# Patient Record
Sex: MALE | Race: BLACK OR AFRICAN AMERICAN | NOT HISPANIC OR LATINO | Employment: UNEMPLOYED | URBAN - METROPOLITAN AREA
[De-identification: names, ages, dates, MRNs, and addresses within clinical notes are randomized per-mention and may not be internally consistent; named-entity substitution may affect disease eponyms.]

---

## 2018-10-14 ENCOUNTER — HISTORICAL (OUTPATIENT)
Dept: LAB | Facility: HOSPITAL | Age: 48
End: 2018-10-14

## 2018-10-14 LAB
ABS NEUT (OLG): 5.27 X10(3)/MCL (ref 2.1–9.2)
ALBUMIN SERPL-MCNC: 3.4 GM/DL (ref 3.4–5)
ALBUMIN/GLOB SERPL: 1 RATIO (ref 1–2)
ALP SERPL-CCNC: 63 UNIT/L (ref 45–117)
ALT SERPL-CCNC: 54 UNIT/L (ref 12–78)
AMPHET UR QL SCN: NEGATIVE
AST SERPL-CCNC: 131 UNIT/L (ref 15–37)
BARBITURATE SCN PRESENT UR: NEGATIVE
BASOPHILS # BLD AUTO: 0.06 X10(3)/MCL
BASOPHILS NFR BLD AUTO: 1 %
BENZODIAZ UR QL SCN: NEGATIVE
BILIRUB SERPL-MCNC: 0.6 MG/DL (ref 0.2–1)
BILIRUBIN DIRECT+TOT PNL SERPL-MCNC: 0.2 MG/DL
BILIRUBIN DIRECT+TOT PNL SERPL-MCNC: 0.4 MG/DL
BUN SERPL-MCNC: 15 MG/DL (ref 7–18)
CALCIUM SERPL-MCNC: 8.3 MG/DL (ref 8.5–10.1)
CANNABINOIDS UR QL SCN: NEGATIVE
CHLORIDE SERPL-SCNC: 103 MMOL/L (ref 98–107)
CO2 SERPL-SCNC: 30 MMOL/L (ref 21–32)
COCAINE UR QL SCN: POSITIVE
CREAT SERPL-MCNC: 1.5 MG/DL (ref 0.6–1.3)
EOSINOPHIL # BLD AUTO: 0.24 X10(3)/MCL
EOSINOPHIL NFR BLD AUTO: 3 %
ERYTHROCYTE [DISTWIDTH] IN BLOOD BY AUTOMATED COUNT: 14.6 % (ref 11.5–14.5)
ETHANOL SERPL-MCNC: <3 MG/DL
GLOBULIN SER-MCNC: 3.6 GM/ML (ref 2.3–3.5)
GLUCOSE SERPL-MCNC: 91 MG/DL (ref 74–106)
HCT VFR BLD AUTO: 42.1 % (ref 40–51)
HGB BLD-MCNC: 13.5 GM/DL (ref 13.5–17.5)
IMM GRANULOCYTES # BLD AUTO: 0.02 10*3/UL
IMM GRANULOCYTES NFR BLD AUTO: 0 %
LYMPHOCYTES # BLD AUTO: 1.6 X10(3)/MCL
LYMPHOCYTES NFR BLD AUTO: 20 % (ref 13–40)
MAGNESIUM SERPL-MCNC: 2.6 MG/DL (ref 1.8–2.4)
MCH RBC QN AUTO: 29.7 PG (ref 26–34)
MCHC RBC AUTO-ENTMCNC: 32.1 GM/DL (ref 31–37)
MCV RBC AUTO: 92.5 FL (ref 80–100)
MONOCYTES # BLD AUTO: 0.84 X10(3)/MCL
MONOCYTES NFR BLD AUTO: 10 % (ref 4–12)
NEUTROPHILS # BLD AUTO: 5.27 X10(3)/MCL
NEUTROPHILS NFR BLD AUTO: 66 X10(3)/MCL
OPIATES UR QL SCN: NEGATIVE
PCP UR QL: NEGATIVE
PHOSPHATE SERPL-MCNC: 4.1 MG/DL (ref 2.5–4.9)
PLATELET # BLD AUTO: 224 X10(3)/MCL (ref 130–400)
PMV BLD AUTO: 11.9 FL (ref 7.4–10.4)
POTASSIUM SERPL-SCNC: 3.9 MMOL/L (ref 3.5–5.1)
PROT SERPL-MCNC: 7 GM/DL (ref 6.4–8.2)
RBC # BLD AUTO: 4.55 X10(6)/MCL (ref 4.5–5.9)
SODIUM SERPL-SCNC: 139 MMOL/L (ref 136–145)
WBC # SPEC AUTO: 8 X10(3)/MCL (ref 4.5–11)

## 2022-05-05 PROCEDURE — 99284 EMERGENCY DEPT VISIT MOD MDM: CPT

## 2022-05-06 ENCOUNTER — HOSPITAL ENCOUNTER (EMERGENCY)
Facility: HOSPITAL | Age: 52
Discharge: HOME | End: 2022-05-06
Attending: FAMILY MEDICINE
Payer: MEDICAID

## 2022-05-06 VITALS
TEMPERATURE: 97 F | OXYGEN SATURATION: 99 % | HEART RATE: 72 BPM | WEIGHT: 162.25 LBS | DIASTOLIC BLOOD PRESSURE: 110 MMHG | SYSTOLIC BLOOD PRESSURE: 174 MMHG | RESPIRATION RATE: 20 BRPM | BODY MASS INDEX: 20.17 KG/M2 | HEIGHT: 75 IN

## 2022-05-06 DIAGNOSIS — I10 HYPERTENSION, UNSPECIFIED TYPE: ICD-10-CM

## 2022-05-06 DIAGNOSIS — M21.612 BUNION OF GREAT TOE OF LEFT FOOT: ICD-10-CM

## 2022-05-06 DIAGNOSIS — B35.3 TINEA PEDIS OF BOTH FEET: Primary | ICD-10-CM

## 2022-05-06 PROCEDURE — 25000003 PHARM REV CODE 250: Performed by: STUDENT IN AN ORGANIZED HEALTH CARE EDUCATION/TRAINING PROGRAM

## 2022-05-06 RX ORDER — CLOTRIMAZOLE 1 %
CREAM (GRAM) TOPICAL
Qty: 15 G | Refills: 0 | Status: SHIPPED | OUTPATIENT
Start: 2022-05-06 | End: 2022-09-17 | Stop reason: ALTCHOICE

## 2022-05-06 RX ORDER — AMLODIPINE BESYLATE 5 MG/1
5 TABLET ORAL DAILY
Qty: 30 TABLET | Refills: 0 | Status: SHIPPED | OUTPATIENT
Start: 2022-05-06 | End: 2022-09-17 | Stop reason: ALTCHOICE

## 2022-05-06 RX ORDER — AMLODIPINE BESYLATE 5 MG/1
5 TABLET ORAL
Status: COMPLETED | OUTPATIENT
Start: 2022-05-06 | End: 2022-05-06

## 2022-05-06 RX ADMIN — AMLODIPINE BESYLATE 5 MG: 5 TABLET ORAL at 01:05

## 2022-05-06 RX ADMIN — FLUCONAZOLE 150 MG: 50 TABLET ORAL at 01:05

## 2022-05-06 NOTE — ED PROVIDER NOTES
Encounter Date: 5/5/2022       History     Chief Complaint   Patient presents with    Leg Pain     Jamshid. Foot pain     A 52-year-old history reason for ED with bilateral leg pain.  Patient with a sharp, throbbing pain especially in the area of his left great big toe.  Patient also has a wet intertrigo.  Patient presented with similar complaints in the past, patient was given Clotrimazole which she he did not help with his symptoms.  Patient that this is been happening for the past 3 months.  Patient attributed to his poor foot wear.  Patient denies any other symptoms at this time.        Review of patient's allergies indicates:  No Known Allergies  No past medical history on file.  No past surgical history on file.  No family history on file.     Review of Systems   Constitutional: Negative for diaphoresis, fever and unexpected weight change.   Respiratory: Negative for cough, shortness of breath and wheezing.    Cardiovascular: Negative for chest pain.   Gastrointestinal: Negative for blood in stool, constipation, diarrhea, nausea and vomiting.   Genitourinary: Negative for dysuria and hematuria.   Neurological: Negative for headaches.       Physical Exam     Initial Vitals [05/05/22 2352]   BP Pulse Resp Temp SpO2   (!) 174/115 75 20 97 °F (36.1 °C) 99 %      MAP       --         Physical Exam    Constitutional: He appears well-developed and well-nourished. No distress.   HENT:   Mouth/Throat: Oropharynx is clear and moist and mucous membranes are normal.   Eyes: Conjunctivae and EOM are normal. Pupils are equal, round, and reactive to light.   Neck: No stridor present. No JVD present.   Cardiovascular: Normal rate, regular rhythm, S1 normal, S2 normal, normal heart sounds, intact distal pulses and normal pulses.   Pulmonary/Chest: Effort normal and breath sounds normal. No accessory muscle usage. No respiratory distress. He has no wheezes. He has no rhonchi.   Abdominal: Abdomen is soft. There is no abdominal  tenderness.   Musculoskeletal:        Legs:       Comments: Toe Web tinea      Neurological: He is alert and oriented to person, place, and time. He has normal strength. No cranial nerve deficit. He displays a negative Romberg sign.   Skin: Skin is warm, dry and intact.   Psychiatric: He has a normal mood and affect. His speech is normal.         ED Course   Procedures  Labs Reviewed - No data to display       Imaging Results    None          Medications   fluconazole tablet 150 mg (150 mg Oral Given 5/6/22 0118)   amLODIPine tablet 5 mg (5 mg Oral Given 5/6/22 0118)     Medical Decision Making:   Initial Assessment:   Tinea Pedis  Bunion  Patient was found to be hypertensive, in chart review patient has been hypertensive   ED Management:  PO fluconazole, Patient was started on amlodipine    Scripts were given for topical clotrimazole and amlodipine                        Clinical Impression:   Final diagnoses:  [B35.3] Tinea pedis of both feet (Primary)  [M21.612] Bunion of great toe of left foot     Follow up in IM clinic for bunion, tinea, and htn            Antoine Brooke MD  Resident  05/06/22 4028

## 2022-09-17 ENCOUNTER — HOSPITAL ENCOUNTER (EMERGENCY)
Facility: HOSPITAL | Age: 52
Discharge: HOME | End: 2022-09-17
Attending: FAMILY MEDICINE
Payer: MEDICAID

## 2022-09-17 VITALS
OXYGEN SATURATION: 98 % | SYSTOLIC BLOOD PRESSURE: 157 MMHG | HEIGHT: 75 IN | RESPIRATION RATE: 18 BRPM | HEART RATE: 77 BPM | TEMPERATURE: 98 F | BODY MASS INDEX: 20.31 KG/M2 | DIASTOLIC BLOOD PRESSURE: 97 MMHG | WEIGHT: 163.38 LBS

## 2022-09-17 DIAGNOSIS — M21.612 BILATERAL BUNIONS: Primary | ICD-10-CM

## 2022-09-17 DIAGNOSIS — M21.611 BILATERAL BUNIONS: Primary | ICD-10-CM

## 2022-09-17 PROCEDURE — 99283 EMERGENCY DEPT VISIT LOW MDM: CPT

## 2022-09-17 RX ORDER — INDOMETHACIN 50 MG/1
50 CAPSULE ORAL 3 TIMES DAILY PRN
Qty: 30 CAPSULE | Refills: 0 | Status: SHIPPED | OUTPATIENT
Start: 2022-09-17 | End: 2022-09-27

## 2022-12-30 ENCOUNTER — HOSPITAL ENCOUNTER (EMERGENCY)
Facility: HOSPITAL | Age: 52
Discharge: PSYCHIATRIC OR SUBSTANCE USE FACILITY | End: 2022-12-30
Attending: EMERGENCY MEDICINE
Payer: MEDICAID

## 2022-12-30 VITALS
WEIGHT: 171.94 LBS | RESPIRATION RATE: 18 BRPM | HEIGHT: 74 IN | BODY MASS INDEX: 22.07 KG/M2 | DIASTOLIC BLOOD PRESSURE: 83 MMHG | OXYGEN SATURATION: 97 % | TEMPERATURE: 99 F | HEART RATE: 69 BPM | SYSTOLIC BLOOD PRESSURE: 141 MMHG

## 2022-12-30 DIAGNOSIS — F10.10 ALCOHOL ABUSE: ICD-10-CM

## 2022-12-30 DIAGNOSIS — R44.1 VISUAL HALLUCINATIONS: ICD-10-CM

## 2022-12-30 DIAGNOSIS — F14.10 COCAINE ABUSE: ICD-10-CM

## 2022-12-30 DIAGNOSIS — Z91.199 MEDICALLY NONCOMPLIANT: ICD-10-CM

## 2022-12-30 DIAGNOSIS — Z00.8 MEDICAL CLEARANCE FOR PSYCHIATRIC ADMISSION: Primary | ICD-10-CM

## 2022-12-30 DIAGNOSIS — R44.0 AUDITORY HALLUCINATIONS: ICD-10-CM

## 2022-12-30 DIAGNOSIS — S00.83XA FACIAL CONTUSION, INITIAL ENCOUNTER: ICD-10-CM

## 2022-12-30 DIAGNOSIS — F31.9 BIPOLAR AFFECTIVE DISORDER, REMISSION STATUS UNSPECIFIED: ICD-10-CM

## 2022-12-30 DIAGNOSIS — R45.850 HOMICIDAL IDEATION: ICD-10-CM

## 2022-12-30 DIAGNOSIS — F23 ACUTE PSYCHOSIS: ICD-10-CM

## 2022-12-30 DIAGNOSIS — F20.9 CHRONIC SCHIZOPHRENIA: ICD-10-CM

## 2022-12-30 LAB
ALBUMIN SERPL-MCNC: 4.3 G/DL (ref 3.5–5)
ALBUMIN/GLOB SERPL: 1 RATIO (ref 1.1–2)
ALP SERPL-CCNC: 91 UNIT/L (ref 40–150)
ALT SERPL-CCNC: 23 UNIT/L (ref 0–55)
AMPHET UR QL SCN: NEGATIVE
APAP SERPL-MCNC: <17.4 UG/ML (ref 17.4–30)
APPEARANCE UR: CLEAR
AST SERPL-CCNC: 36 UNIT/L (ref 5–34)
BACTERIA #/AREA URNS AUTO: ABNORMAL /HPF
BARBITURATE SCN PRESENT UR: NEGATIVE
BASOPHILS # BLD AUTO: 0.06 X10(3)/MCL (ref 0–0.2)
BASOPHILS NFR BLD AUTO: 0.9 %
BENZODIAZ UR QL SCN: NEGATIVE
BILIRUB UR QL STRIP.AUTO: NEGATIVE MG/DL
BILIRUBIN DIRECT+TOT PNL SERPL-MCNC: 0.5 MG/DL
BUN SERPL-MCNC: 17.4 MG/DL (ref 8.4–25.7)
CALCIUM SERPL-MCNC: 10 MG/DL (ref 8.4–10.2)
CANNABINOIDS UR QL SCN: NEGATIVE
CHLORIDE SERPL-SCNC: 103 MMOL/L (ref 98–107)
CO2 SERPL-SCNC: 29 MMOL/L (ref 22–29)
COCAINE UR QL SCN: POSITIVE
COLOR UR AUTO: YELLOW
CREAT SERPL-MCNC: 1.29 MG/DL (ref 0.73–1.18)
EOSINOPHIL # BLD AUTO: 0.14 X10(3)/MCL (ref 0–0.9)
EOSINOPHIL NFR BLD AUTO: 2.2 %
ERYTHROCYTE [DISTWIDTH] IN BLOOD BY AUTOMATED COUNT: 14.6 % (ref 11.6–14.4)
ETHANOL SERPL-MCNC: <10 MG/DL
FENTANYL UR QL SCN: NEGATIVE
GFR SERPLBLD CREATININE-BSD FMLA CKD-EPI: >60 MLS/MIN/1.73/M2
GLOBULIN SER-MCNC: 4.1 GM/DL (ref 2.4–3.5)
GLUCOSE SERPL-MCNC: 61 MG/DL (ref 74–100)
GLUCOSE UR QL STRIP.AUTO: NORMAL MG/DL
HCT VFR BLD AUTO: 40.8 % (ref 42–52)
HGB BLD-MCNC: 13.2 GM/DL (ref 14–18)
HYALINE CASTS #/AREA URNS LPF: ABNORMAL /LPF
IMM GRANULOCYTES # BLD AUTO: 0.01 X10(3)/MCL (ref 0–0.04)
IMM GRANULOCYTES NFR BLD AUTO: 0.2 %
KETONES UR QL STRIP.AUTO: NEGATIVE MG/DL
LEUKOCYTE ESTERASE UR QL STRIP.AUTO: NEGATIVE UNIT/L
LYMPHOCYTES # BLD AUTO: 1.4 X10(3)/MCL (ref 0.6–4.6)
LYMPHOCYTES NFR BLD AUTO: 21.8 %
MCH RBC QN AUTO: 30.2 PG
MCHC RBC AUTO-ENTMCNC: 32.4 MG/DL (ref 33–36)
MCV RBC AUTO: 93.4 FL (ref 80–94)
MDMA UR QL SCN: NEGATIVE
MONOCYTES # BLD AUTO: 0.91 X10(3)/MCL (ref 0.1–1.3)
MONOCYTES NFR BLD AUTO: 14.2 %
MUCOUS THREADS URNS QL MICRO: ABNORMAL /LPF
NEUTROPHILS # BLD AUTO: 3.91 X10(3)/MCL (ref 2.1–9.2)
NEUTROPHILS NFR BLD AUTO: 60.7 %
NITRITE UR QL STRIP.AUTO: NEGATIVE
NRBC BLD AUTO-RTO: 0 % (ref 0–1)
OPIATES UR QL SCN: NEGATIVE
PCP UR QL: NEGATIVE
PH UR STRIP.AUTO: 5.5 [PH]
PH UR: 5.5 [PH] (ref 3–11)
PLATELET # BLD AUTO: 270 X10(3)/MCL (ref 140–371)
PMV BLD AUTO: 11.5 FL (ref 9.4–12.4)
POTASSIUM SERPL-SCNC: 4.1 MMOL/L (ref 3.5–5.1)
PROT SERPL-MCNC: 8.4 GM/DL (ref 6.4–8.3)
PROT UR QL STRIP.AUTO: ABNORMAL MG/DL
RBC # BLD AUTO: 4.37 X10(6)/MCL (ref 4.7–6.1)
RBC #/AREA URNS AUTO: ABNORMAL /HPF
RBC UR QL AUTO: NEGATIVE UNIT/L
SALICYLATES SERPL-MCNC: <5 MG/DL
SARS-COV-2 RDRP RESP QL NAA+PROBE: NEGATIVE
SODIUM SERPL-SCNC: 141 MMOL/L (ref 136–145)
SP GR UR STRIP.AUTO: 1.03
SQUAMOUS #/AREA URNS LPF: ABNORMAL /HPF
TSH SERPL-ACNC: 1.34 UIU/ML (ref 0.35–4.94)
UROBILINOGEN UR STRIP-ACNC: ABNORMAL MG/DL
WBC # SPEC AUTO: 6.4 X10(3)/MCL (ref 4.5–11.5)
WBC #/AREA URNS AUTO: ABNORMAL /HPF

## 2022-12-30 PROCEDURE — 85025 COMPLETE CBC W/AUTO DIFF WBC: CPT | Performed by: EMERGENCY MEDICINE

## 2022-12-30 PROCEDURE — 99285 EMERGENCY DEPT VISIT HI MDM: CPT | Mod: 25

## 2022-12-30 PROCEDURE — 84443 ASSAY THYROID STIM HORMONE: CPT | Performed by: EMERGENCY MEDICINE

## 2022-12-30 PROCEDURE — 25000003 PHARM REV CODE 250: Performed by: EMERGENCY MEDICINE

## 2022-12-30 PROCEDURE — 80143 DRUG ASSAY ACETAMINOPHEN: CPT | Performed by: EMERGENCY MEDICINE

## 2022-12-30 PROCEDURE — 80179 DRUG ASSAY SALICYLATE: CPT | Performed by: EMERGENCY MEDICINE

## 2022-12-30 PROCEDURE — 87635 SARS-COV-2 COVID-19 AMP PRB: CPT | Performed by: EMERGENCY MEDICINE

## 2022-12-30 PROCEDURE — 80307 DRUG TEST PRSMV CHEM ANLYZR: CPT | Performed by: EMERGENCY MEDICINE

## 2022-12-30 PROCEDURE — 81001 URINALYSIS AUTO W/SCOPE: CPT | Performed by: EMERGENCY MEDICINE

## 2022-12-30 PROCEDURE — 80053 COMPREHEN METABOLIC PANEL: CPT | Performed by: EMERGENCY MEDICINE

## 2022-12-30 PROCEDURE — 82077 ASSAY SPEC XCP UR&BREATH IA: CPT | Performed by: EMERGENCY MEDICINE

## 2022-12-30 RX ORDER — CLINDAMYCIN HYDROCHLORIDE 150 MG/1
300 CAPSULE ORAL EVERY 8 HOURS
Status: DISCONTINUED | OUTPATIENT
Start: 2022-12-30 | End: 2022-12-30 | Stop reason: HOSPADM

## 2022-12-30 RX ADMIN — CLINDAMYCIN HYDROCHLORIDE 300 MG: 150 CAPSULE ORAL at 10:12
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
## 2023-01-14 ENCOUNTER — HOSPITAL ENCOUNTER (EMERGENCY)
Facility: HOSPITAL | Age: 53
Discharge: PSYCHIATRIC OR SUBSTANCE USE FACILITY | End: 2023-01-15
Attending: EMERGENCY MEDICINE
Payer: MEDICAID

## 2023-01-14 DIAGNOSIS — R45.850 HOMICIDAL IDEATIONS: ICD-10-CM

## 2023-01-14 DIAGNOSIS — R44.3 HALLUCINATIONS: Primary | ICD-10-CM

## 2023-01-14 LAB
ABS NEUT CALC (OHS): 6.21 X10(3)/MCL (ref 2.1–9.2)
ALBUMIN SERPL-MCNC: 4 G/DL (ref 3.5–5)
ALBUMIN/GLOB SERPL: 1.1 RATIO (ref 1.1–2)
ALP SERPL-CCNC: 83 UNIT/L (ref 40–150)
ALT SERPL-CCNC: 22 UNIT/L (ref 0–55)
AMPHET UR QL SCN: NEGATIVE
APAP SERPL-MCNC: <17.4 UG/ML (ref 17.4–30)
APPEARANCE UR: CLEAR
AST SERPL-CCNC: 25 UNIT/L (ref 5–34)
BACTERIA #/AREA URNS AUTO: ABNORMAL /HPF
BARBITURATE SCN PRESENT UR: NEGATIVE
BENZODIAZ UR QL SCN: NEGATIVE
BILIRUB UR QL STRIP.AUTO: NEGATIVE MG/DL
BILIRUBIN DIRECT+TOT PNL SERPL-MCNC: 0.4 MG/DL
BUN SERPL-MCNC: 15.1 MG/DL (ref 8.4–25.7)
CALCIUM SERPL-MCNC: 9.6 MG/DL (ref 8.4–10.2)
CANNABINOIDS UR QL SCN: NEGATIVE
CHLORIDE SERPL-SCNC: 104 MMOL/L (ref 98–107)
CO2 SERPL-SCNC: 27 MMOL/L (ref 22–29)
COCAINE UR QL SCN: POSITIVE
COLOR UR AUTO: COLORLESS
CREAT SERPL-MCNC: 1.15 MG/DL (ref 0.73–1.18)
EOSINOPHIL NFR BLD MANUAL: 0.29 X10(3)/MCL (ref 0–0.9)
EOSINOPHIL NFR BLD MANUAL: 3 % (ref 0–8)
ERYTHROCYTE [DISTWIDTH] IN BLOOD BY AUTOMATED COUNT: 14.1 % (ref 11.5–17)
ETHANOL SERPL-MCNC: <10 MG/DL
FENTANYL UR QL SCN: NEGATIVE
FLUAV AG UPPER RESP QL IA.RAPID: NOT DETECTED
FLUBV AG UPPER RESP QL IA.RAPID: NOT DETECTED
GFR SERPLBLD CREATININE-BSD FMLA CKD-EPI: >60 MLS/MIN/1.73/M2
GLOBULIN SER-MCNC: 3.8 GM/DL (ref 2.4–3.5)
GLUCOSE SERPL-MCNC: 81 MG/DL (ref 74–100)
GLUCOSE UR QL STRIP.AUTO: NORMAL MG/DL
HCT VFR BLD AUTO: 40.9 % (ref 42–52)
HGB BLD-MCNC: 12.6 GM/DL (ref 14–18)
HYALINE CASTS #/AREA URNS LPF: ABNORMAL /LPF
IMM GRANULOCYTES # BLD AUTO: 0.02 X10(3)/MCL (ref 0–0.04)
IMM GRANULOCYTES NFR BLD AUTO: 0.2 %
KETONES UR QL STRIP.AUTO: NEGATIVE MG/DL
LEUKOCYTE ESTERASE UR QL STRIP.AUTO: NEGATIVE UNIT/L
LYMPHOCYTES NFR BLD MANUAL: 2.04 X10(3)/MCL
LYMPHOCYTES NFR BLD MANUAL: 21 % (ref 13–40)
MCH RBC QN AUTO: 29.2 PG
MCHC RBC AUTO-ENTMCNC: 30.8 MG/DL (ref 33–36)
MCV RBC AUTO: 94.9 FL (ref 80–94)
MDMA UR QL SCN: NEGATIVE
MONOCYTES NFR BLD MANUAL: 1.16 X10(3)/MCL (ref 0.1–1.3)
MONOCYTES NFR BLD MANUAL: 12 % (ref 2–11)
NEUTROPHILS NFR BLD MANUAL: 64 % (ref 47–80)
NITRITE UR QL STRIP.AUTO: NEGATIVE
NRBC BLD AUTO-RTO: 0 %
OPIATES UR QL SCN: NEGATIVE
PCP UR QL: NEGATIVE
PH UR STRIP.AUTO: 7 [PH]
PH UR: 7 [PH] (ref 3–11)
PLATELET # BLD AUTO: 250 X10(3)/MCL (ref 130–400)
PLATELET # BLD EST: ADEQUATE 10*3/UL
PMV BLD AUTO: 11.9 FL (ref 7.4–10.4)
POTASSIUM SERPL-SCNC: 4 MMOL/L (ref 3.5–5.1)
PROT SERPL-MCNC: 7.8 GM/DL (ref 6.4–8.3)
PROT UR QL STRIP.AUTO: NEGATIVE MG/DL
RBC # BLD AUTO: 4.31 X10(6)/MCL (ref 4.7–6.1)
RBC #/AREA URNS AUTO: ABNORMAL /HPF
RBC MORPH BLD: NORMAL
RBC UR QL AUTO: NEGATIVE UNIT/L
SARS-COV-2 RNA RESP QL NAA+PROBE: NOT DETECTED
SODIUM SERPL-SCNC: 139 MMOL/L (ref 136–145)
SP GR UR STRIP.AUTO: 1.01
SPECIFIC GRAVITY, URINE AUTO (.000) (OHS): 1.01 (ref 1–1.03)
SQUAMOUS #/AREA URNS LPF: ABNORMAL /HPF
TSH SERPL-ACNC: 1.52 UIU/ML (ref 0.35–4.94)
UROBILINOGEN UR STRIP-ACNC: NORMAL MG/DL
WBC # SPEC AUTO: 9.7 X10(3)/MCL (ref 4.5–11.5)
WBC #/AREA URNS AUTO: ABNORMAL /HPF

## 2023-01-14 PROCEDURE — 80143 DRUG ASSAY ACETAMINOPHEN: CPT | Performed by: EMERGENCY MEDICINE

## 2023-01-14 PROCEDURE — 82077 ASSAY SPEC XCP UR&BREATH IA: CPT | Performed by: EMERGENCY MEDICINE

## 2023-01-14 PROCEDURE — 81001 URINALYSIS AUTO W/SCOPE: CPT | Mod: 59 | Performed by: EMERGENCY MEDICINE

## 2023-01-14 PROCEDURE — 85027 COMPLETE CBC AUTOMATED: CPT | Performed by: EMERGENCY MEDICINE

## 2023-01-14 PROCEDURE — 0240U COVID/FLU A&B PCR: CPT | Performed by: EMERGENCY MEDICINE

## 2023-01-14 PROCEDURE — 84443 ASSAY THYROID STIM HORMONE: CPT | Performed by: EMERGENCY MEDICINE

## 2023-01-14 PROCEDURE — 80307 DRUG TEST PRSMV CHEM ANLYZR: CPT | Performed by: EMERGENCY MEDICINE

## 2023-01-14 PROCEDURE — 99285 EMERGENCY DEPT VISIT HI MDM: CPT

## 2023-01-14 PROCEDURE — 80053 COMPREHEN METABOLIC PANEL: CPT | Performed by: EMERGENCY MEDICINE

## 2023-01-15 ENCOUNTER — HOSPITAL ENCOUNTER (INPATIENT)
Facility: HOSPITAL | Age: 53
LOS: 5 days | Discharge: HOME | DRG: 885 | End: 2023-01-20
Attending: PSYCHIATRY & NEUROLOGY | Admitting: PSYCHIATRY & NEUROLOGY
Payer: MEDICAID

## 2023-01-15 VITALS
OXYGEN SATURATION: 98 % | HEIGHT: 74 IN | RESPIRATION RATE: 17 BRPM | BODY MASS INDEX: 23.1 KG/M2 | WEIGHT: 180 LBS | TEMPERATURE: 98 F | HEART RATE: 87 BPM | SYSTOLIC BLOOD PRESSURE: 151 MMHG | DIASTOLIC BLOOD PRESSURE: 81 MMHG

## 2023-01-15 DIAGNOSIS — F29 PSYCHOSIS: ICD-10-CM

## 2023-01-15 PROCEDURE — 11400000 HC PSYCH PRIVATE ROOM

## 2023-01-15 PROCEDURE — 25000003 PHARM REV CODE 250

## 2023-01-15 RX ORDER — DIPHENHYDRAMINE HCL 50 MG
50 CAPSULE ORAL EVERY 6 HOURS PRN
Status: DISCONTINUED | OUTPATIENT
Start: 2023-01-16 | End: 2023-01-20 | Stop reason: HOSPADM

## 2023-01-15 RX ORDER — IBUPROFEN 200 MG
1 TABLET ORAL DAILY
Status: DISCONTINUED | OUTPATIENT
Start: 2023-01-15 | End: 2023-01-20 | Stop reason: HOSPADM

## 2023-01-15 RX ORDER — HYDROXYZINE HYDROCHLORIDE 50 MG/1
50 TABLET, FILM COATED ORAL EVERY 6 HOURS PRN
Status: DISCONTINUED | OUTPATIENT
Start: 2023-01-15 | End: 2023-01-20 | Stop reason: HOSPADM

## 2023-01-15 RX ORDER — ACETAMINOPHEN 325 MG/1
650 TABLET ORAL EVERY 6 HOURS PRN
Status: DISCONTINUED | OUTPATIENT
Start: 2023-01-16 | End: 2023-01-20 | Stop reason: HOSPADM

## 2023-01-15 RX ORDER — DIPHENHYDRAMINE HYDROCHLORIDE 50 MG/ML
50 INJECTION INTRAMUSCULAR; INTRAVENOUS EVERY 6 HOURS PRN
Status: DISCONTINUED | OUTPATIENT
Start: 2023-01-16 | End: 2023-01-20 | Stop reason: HOSPADM

## 2023-01-15 RX ORDER — ONDANSETRON 4 MG/1
4 TABLET, ORALLY DISINTEGRATING ORAL EVERY 8 HOURS PRN
Status: DISCONTINUED | OUTPATIENT
Start: 2023-01-15 | End: 2023-01-20 | Stop reason: HOSPADM

## 2023-01-15 RX ORDER — HALOPERIDOL 5 MG/ML
10 INJECTION INTRAMUSCULAR EVERY 6 HOURS PRN
Status: DISCONTINUED | OUTPATIENT
Start: 2023-01-16 | End: 2023-01-20 | Stop reason: HOSPADM

## 2023-01-15 RX ORDER — RISPERIDONE 0.25 MG/1
0.5 TABLET ORAL 2 TIMES DAILY
Status: DISCONTINUED | OUTPATIENT
Start: 2023-01-15 | End: 2023-01-20 | Stop reason: HOSPADM

## 2023-01-15 RX ORDER — TRAZODONE HYDROCHLORIDE 100 MG/1
100 TABLET ORAL NIGHTLY PRN
Status: DISCONTINUED | OUTPATIENT
Start: 2023-01-15 | End: 2023-01-20 | Stop reason: HOSPADM

## 2023-01-15 RX ORDER — HALOPERIDOL 5 MG/1
10 TABLET ORAL EVERY 6 HOURS PRN
Status: DISCONTINUED | OUTPATIENT
Start: 2023-01-16 | End: 2023-01-20 | Stop reason: HOSPADM

## 2023-01-15 RX ORDER — ADHESIVE BANDAGE
30 BANDAGE TOPICAL DAILY PRN
Status: DISCONTINUED | OUTPATIENT
Start: 2023-01-16 | End: 2023-01-20 | Stop reason: HOSPADM

## 2023-01-15 RX ORDER — PROMETHAZINE HYDROCHLORIDE 25 MG/1
25 TABLET ORAL EVERY 6 HOURS PRN
Status: DISCONTINUED | OUTPATIENT
Start: 2023-01-15 | End: 2023-01-20 | Stop reason: HOSPADM

## 2023-01-15 RX ORDER — LORAZEPAM 2 MG/ML
2 INJECTION INTRAMUSCULAR EVERY 6 HOURS PRN
Status: DISCONTINUED | OUTPATIENT
Start: 2023-01-16 | End: 2023-01-20 | Stop reason: HOSPADM

## 2023-01-15 RX ORDER — MAG HYDROX/ALUMINUM HYD/SIMETH 200-200-20
30 SUSPENSION, ORAL (FINAL DOSE FORM) ORAL EVERY 6 HOURS PRN
Status: DISCONTINUED | OUTPATIENT
Start: 2023-01-16 | End: 2023-01-20 | Stop reason: HOSPADM

## 2023-01-15 RX ORDER — SERTRALINE HYDROCHLORIDE 50 MG/1
50 TABLET, FILM COATED ORAL DAILY
Status: DISCONTINUED | OUTPATIENT
Start: 2023-01-15 | End: 2023-01-18

## 2023-01-15 RX ADMIN — RISPERIDONE 0.5 MG: 0.25 TABLET ORAL at 08:01

## 2023-01-15 RX ADMIN — RISPERIDONE 0.5 MG: 0.25 TABLET ORAL at 12:01

## 2023-01-15 RX ADMIN — SERTRALINE HYDROCHLORIDE 50 MG: 50 TABLET ORAL at 02:01
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
## 2023-01-15 NOTE — NURSING
This 52 year old male is admitted from [facility] ED for psychosis. He is alert, oriented and cooperative. Contraband search done. Patient oriented to unit and rules. He denies any thoughts of self-harm. Patient reports having auditory and visual hallucinations along with homicidal ideations. Staff will maintain close observation for safety.

## 2023-01-15 NOTE — PLAN OF CARE
Problem: Adult Inpatient Plan of Care  Goal: Plan of Care Review  Outcome: Ongoing, Progressing  Goal: Patient-Specific Goal (Individualized)  Outcome: Ongoing, Progressing  Goal: Absence of Hospital-Acquired Illness or Injury  Outcome: Ongoing, Progressing  Goal: Optimal Comfort and Wellbeing  Outcome: Ongoing, Progressing  Goal: Readiness for Transition of Care  Outcome: Ongoing, Progressing     Problem: Activity and Energy Impairment (Excessive Substance Use)  Goal: Optimized Energy Level (Excessive Substance Use)  Outcome: Ongoing, Progressing     Problem: Behavior Regulation Impairment (Excessive Substance Use)  Goal: Improved Behavioral Control (Excessive Substance Use)  Outcome: Ongoing, Progressing     Problem: Decreased Participation and Engagement (Excessive Substance Use)  Goal: Increased Participation and Engagement (Excessive Substance Use)  Outcome: Ongoing, Progressing     Problem: Physiologic Impairment (Excessive Substance Use)  Goal: Improved Physiologic Symptoms (Excessive Substance Use)  Outcome: Ongoing, Progressing     Problem: Social, Occupational or Functional Impairment (Excessive Substance Use)  Goal: Enhanced Social, Occupational or Functional Skills (Excessive Substance Use)  Outcome: Ongoing, Progressing     Problem: Activity and Energy Impairment (Depressive Signs/Symptoms)  Goal: Optimized Energy Level (Depressive Signs/Symptoms)  Outcome: Ongoing, Progressing     Problem: Cognitive Impairment (Depressive Signs/Symptoms)  Goal: Optimized Cognitive Function  Outcome: Ongoing, Progressing     Problem: Decreased Participation/Engagement (Depressive Signs/Symptoms)  Goal: Increased Participation and Engagement (Depressive Signs/Symptoms)  Outcome: Ongoing, Progressing     Problem: Feelings of Worthlessness, Hopelessness or Excessive Guilt (Depressive Signs/Symptoms)  Goal: Enhanced Self-Esteem and Confidence (Depressive Signs/Symptoms)  Outcome: Ongoing, Progressing     Problem: Mood  Impairment (Depressive Signs/Symptoms)  Goal: Improved Mood Symptoms (Depressive Signs/Symptoms)  Outcome: Ongoing, Progressing     Problem: Nutrition Imbalance (Depressive Signs/Symptoms)  Goal: Optimized Nutrition Intake  Outcome: Ongoing, Progressing     Problem: Psychomotor Impairment (Depressive Signs/Symptoms)  Goal: Improved Psychomotor Symptoms (Depressive Signs/Symptoms)  Outcome: Ongoing, Progressing     Problem: Sleep Disturbance (Depressive Signs/Symptoms)  Goal: Improved Sleep (Depressive Signs/Symptoms)  Outcome: Ongoing, Progressing     Problem: Social, Occupational or Functional Impairment (Depressive Signs/Symptoms)  Goal: Enhanced Social, Occupational or Functional Skills (Depressive Signs/Symptoms)  Outcome: Ongoing, Progressing     Problem: Behavior Regulation Impairment (Psychotic Signs/Symptoms)  Goal: Improved Behavioral Control (Psychotic Signs/Symptoms)  Outcome: Ongoing, Progressing     Problem: Cognitive Impairment (Psychotic Signs/Symptoms)  Goal: Optimal Cognitive Function (Psychotic Signs/Symptoms)  Outcome: Ongoing, Progressing     Problem: Decreased Participation and Engagement (Psychotic Signs/Symptoms)  Goal: Increased Participation and Engagement (Psychotic Signs/Symptoms)  Outcome: Ongoing, Progressing     Problem: Mood Impairment (Psychotic Signs/Symptoms)  Goal: Improved Mood Symptoms (Psychotic Signs/Symptoms)  Outcome: Ongoing, Progressing     Problem: Psychomotor Impairment (Psychotic Signs/Symptoms)  Goal: Improved Psychomotor Symptoms (Psychotic Signs/Symptoms)  Outcome: Ongoing, Progressing     Problem: Sensory Perception Impairment (Psychotic Signs/Symptoms)  Goal: Decreased Sensory Symptoms (Psychotic Signs/Symptoms)  Outcome: Ongoing, Progressing     Problem: Sleep Disturbance (Psychotic Signs/Symptoms)  Goal: Improved Sleep (Psychotic Signs/Symptoms)  Outcome: Ongoing, Progressing     Problem: Social, Occupational or Functional Impairment (Psychotic  Signs/Symptoms)  Goal: Enhanced Social, Occupational or Functional Skills (Psychotic Signs/Symptoms)  Outcome: Ongoing, Progressing

## 2023-01-15 NOTE — ED PROVIDER NOTES
Encounter Date: 1/14/2023       History     Chief Complaint   Patient presents with    Hallucinations     Pt. Arrives via [facility] c/o AV hallucinations and HI; Denies SI; Off seroquel for 8 days     Mr. Kevin Dukes is a 52-year-old male presents for mental health evaluation.  Patient states that today started back having auditory and visual hallucinations seeing shadows and hearing voices telling him to hurt other people.  He states that tonight he began feeling very agitated and wanted to hurt people, stating he wanted to damage carcinoma with driving by and kill the occupants.  He states in the medications and they have him on are not working and he needs to come into the hospital and be seen by a psychiatrist before he ends up killing someone because he cannot control himself.    The history is provided by the patient.   Review of patient's allergies indicates:  No Known Allergies  Past Medical History:   Diagnosis Date    Bipolar disorder, unspecified     Schizophrenia, unspecified      No past surgical history on file.  No family history on file.  Social History     Tobacco Use    Smoking status: Former     Types: Cigarettes    Smokeless tobacco: Never   Substance Use Topics    Alcohol use: Not Currently    Drug use: Not Currently     Review of Systems    Physical Exam     Initial Vitals   BP Pulse Resp Temp SpO2   01/14/23 1919 01/14/23 1919 01/14/23 1919 01/14/23 1922 01/14/23 1919   (!) 177/110 (!) 52 16 97.8 °F (36.6 °C) 99 %      MAP       --                Physical Exam    Nursing note and vitals reviewed.  Constitutional: He appears well-developed and well-nourished.   HENT:   Head: Normocephalic and atraumatic.   Eyes: EOM are normal. Pupils are equal, round, and reactive to light.   Neck: Neck supple.   Normal range of motion.  Cardiovascular:  Regular rhythm and intact distal pulses.   Bradycardia present.         Pulmonary/Chest: Breath sounds normal.   Abdominal: Abdomen is soft.   Musculoskeletal:          General: Normal range of motion.      Cervical back: Normal range of motion and neck supple.     Neurological: He is alert and oriented to person, place, and time. GCS score is 15. GCS eye subscore is 4. GCS verbal subscore is 5. GCS motor subscore is 6.   Skin: Skin is warm and dry.   Psychiatric: His affect is labile. He is agitated. He expresses homicidal ideation.   Patient does voice some insight recognizing that he needs better control over his psychiatric symptoms       ED Course   Procedures  Labs Reviewed   COMPREHENSIVE METABOLIC PANEL - Abnormal; Notable for the following components:       Result Value    Globulin 3.8 (*)     All other components within normal limits   URINALYSIS, REFLEX TO URINE CULTURE - Abnormal; Notable for the following components:    Color, UA Colorless (*)     All other components within normal limits   DRUG SCREEN, URINE (BEAKER) - Abnormal; Notable for the following components:    Cocaine, Urine Positive (*)     All other components within normal limits    Narrative:     Cut off concentrations:    Amphetamines - 1000 ng/ml  Barbiturates - 200 ng/ml  Benzodiazepine - 200 ng/ml  Cannabinoids (THC) - 50 ng/ml  Cocaine - 300 ng/ml  Fentanyl - 1.0 ng/ml  MDMA - 500 ng/ml  Opiates - 300 ng/ml   Phencyclidine (PCP) - 25 ng/ml    Specimen submitted for drug analysis and tested for pH and specific gravity in order to evaluate sample integrity. Suspect tampering if specific gravity is <1.003 and/or pH is not within the range of 4.5 - 8.0  False negatives may result form substances such as bleach added to urine.  False positives may result for the presence of a substance with similar chemical structure to the drug or its metabolite.    This test provides only a PRELIMINARY analytical test result. A more specific alternate chemical method must be used in order to obtain a confirmed analytical result. Gas chromatography/mass spectrometry (GC/MS) is the preferred confirmatory method.  Other chemical confirmation methods are available. Clinical consideration and professional judgement should be applied to any drug of abuse test result, particularly when preliminary positive results are used.    Positive results will be confirmed only at the physicians request. Unconfirmed screening results are to be used only for medical purposes (treatment).        ACETAMINOPHEN LEVEL - Abnormal; Notable for the following components:    Acetaminophen Level <17.4 (*)     All other components within normal limits   CBC WITH DIFFERENTIAL - Abnormal; Notable for the following components:    RBC 4.31 (*)     Hgb 12.6 (*)     Hct 40.9 (*)     MCV 94.9 (*)     MCHC 30.8 (*)     MPV 11.9 (*)     All other components within normal limits   MANUAL DIFFERENTIAL - Abnormal; Notable for the following components:    Monocyte Man 12 (*)     All other components within normal limits   TSH - Normal   ALCOHOL,MEDICAL (ETHANOL) - Normal   COVID/FLU A&B PCR - Normal    Narrative:     The Xpert Xpress SARS-CoV-2/FLU/RSV plus is a rapid, multiplexed real-time PCR test intended for the simultaneous qualitative detection and differentiation of SARS-CoV-2, Influenza A, Influenza B, and respiratory syncytial virus (RSV) viral RNA in either nasopharyngeal swab or nasal swab specimens.         CBC W/ AUTO DIFFERENTIAL    Narrative:     The following orders were created for panel order CBC auto differential.  Procedure                               Abnormality         Status                     ---------                               -----------         ------                     CBC with Differential[[id number]]        Abnormal            Final result               Manual Differential[[id number]]          Abnormal            Final result                 Please view results for these tests on the individual orders.   EXTRA TUBES    Narrative:     The following orders were created for panel order EXTRA TUBES.  Procedure                                Abnormality         Status                     ---------                               -----------         ------                     Light Blue Top Hold[[id number]]                              In process                 Gold Top Hold[[id number]]                                    In process                   Please view results for these tests on the individual orders.   LIGHT BLUE TOP HOLD   GOLD TOP HOLD   SARS CORONAVIRUS 2 ANTIGEN POCT, MANUAL READ          Imaging Results    None          Medications - No data to display  Medical Decision Making:   Initial Assessment:   52-year-old male presents with auditory visual hallucinations having homicidal ideations stating he is likely going to kill someone if he does not get back on his psychiatric medications.  States that on his most recent psychiatric admission they had changed his meds and he is no longer getting the Invega Depo shots which he states seemed to help.  He is placed on involuntary commitment for safety and is medically cleared for psychiatric admission.  Clinical Tests:   Lab Tests: Ordered and Reviewed              Medically cleared for psychiatry placement: 1/14/2023  9:15 PM         Clinical Impression:   Final diagnoses:  [R44.3] Hallucinations (Primary)  [R45.850] Homicidal ideations        ED Disposition Condition    Transfer to Psych Facility Stable          ED Prescriptions    None       Follow-up Information    None          Lm Ames, DO  01/14/23 2115       Lm Ames, DO  01/14/23 2117

## 2023-01-15 NOTE — H&P
1/15/2023 11:33 AM   Kevin Dukes   1970   [id number]            Psychiatry Inpatient Admission Note    Date of Admission: 1/15/2023 12:30 AM    Current Legal Status:  PEC    Chief Complaint: Auditory hallucinations    SUBJECTIVE:   History of Present Illness:   Kevin Dukes is a 52 y.o. male placed under a PEC at [facility] auditory and visual hallucinations. Patient endorses a lifelong struggle with these symptoms. Patient states that he was treated successfully with Risperdal during his last visit here but nothing else has seemed to help. Patient endorses trialing Invega Sustenna without success, however it is unclear if patient was compliant. Patient is calm and cooperative during my exam. He denies current AVH but endorses that they have been present since his arrival here. Patient endorses that they usually tell him to do things. He confirms depression but denies any desire to ham himself or others. Will admit for medication management and safety monitoring.         Past Psychiatric History:   Previous Psychiatric Hospitalizations: Last visit was about 2 to 3 years ago   Previous Medication Trials: Invega, Risperdal, Seroquel  Previous Suicide Attempts: Denies   Outpatient psychiatrist: [facility]    Past Medical/Surgical History:   Past Medical History:   Diagnosis Date    Bipolar disorder, unspecified     Schizophrenia, unspecified      History reviewed. No pertinent surgical history.      Family Psychiatric History:   Unknown     Allergies:   Review of patient's allergies indicates:  No Known Allergies    Substance Abuse History:   Tobacco: Denies  Alcohol: Denies  Illicit Substances: Cocaine and THC  Treatment: Multiple      Current Medications:   Home Psychiatric Meds: Seroquel 100 mg    Scheduled Meds:    nicotine  1 patch Transdermal Daily      PRN Meds: [START ON 1/16/2023] acetaminophen, [START ON 1/16/2023] aluminum-magnesium hydroxide-simethicone, [START ON 1/16/2023] haloperidoL **AND** [START  ON 1/16/2023] diphenhydrAMINE **AND** [START ON 1/16/2023] haloperidol lactate **AND** [START ON 1/16/2023] diphenhydrAMINE **AND** [START ON 1/16/2023] lorazepam, hydrOXYzine HCL, [START ON 1/16/2023] magnesium hydroxide 400 mg/5 ml, ondansetron, promethazine, trazodone   Psychotherapeutics (From admission, onward)      Start     Stop Route Frequency Ordered    01/16/23 0000  haloperidoL tablet 10 mg  (Med - Acute  Behavioral Management)        Question:  Is the patient competent?  Answer:  Yes   See Hyperspace for full Linked Orders Report.    -- Oral Every 6 hours PRN 01/15/23 0031    01/16/23 0000  haloperidol lactate injection 10 mg  (Med - Acute  Behavioral Management)        See Hyperspace for full Linked Orders Report.    -- IM Every 6 hours PRN 01/15/23 0031    01/16/23 0000  LORazepam injection 2 mg  (Med - Acute  Behavioral Management)        Question:  Is the patient competent?  Answer:  Yes   See Hyperspace for full Linked Orders Report.    -- IM Every 6 hours PRN 01/15/23 0031    01/15/23 0033  traZODone tablet 100 mg        Question:  Is the patient competent?  Answer:  Yes    -- Oral Nightly PRN 01/15/23 0031              Social History:  Housing Status: Lives alone  Relationship Status/Sexual Orientation: Single   Children: Four   Education: [employer]   Employment Status/Info: Disabled    history: Denies  History of physical/sexual abuse: Denies   Access to gun: Denies       Legal History:   Past Charges/Incarcerations: Five times.    Pending charges: Denies      OBJECTIVE:   Medical Review Of Systems:  A comprehensive review of systems was negative.    Vitals   Vitals:    01/15/23 0025   BP: (!) 159/86   Pulse: 86   Resp: 18   Temp: 98 °F (36.7 °C)        Labs/Imaging/Studies:   Recent Results (from the past 48 hour(s))   Comprehensive metabolic panel    Collection Time: 01/14/23  7:43 PM   Result Value Ref Range    Sodium Level 139 136 - 145 mmol/L    Potassium Level 4.0 3.5 -  5.1 mmol/L    Chloride 104 98 - 107 mmol/L    Carbon Dioxide 27 22 - 29 mmol/L    Glucose Level 81 74 - 100 mg/dL    Blood Urea Nitrogen 15.1 8.4 - 25.7 mg/dL    Creatinine 1.15 0.73 - 1.18 mg/dL    Calcium Level Total 9.6 8.4 - 10.2 mg/dL    Protein Total 7.8 6.4 - 8.3 gm/dL    Albumin Level 4.0 3.5 - 5.0 g/dL    Globulin 3.8 (H) 2.4 - 3.5 gm/dL    Albumin/Globulin Ratio 1.1 1.1 - 2.0 ratio    Bilirubin Total 0.4 <=1.5 mg/dL    Alkaline Phosphatase 83 40 - 150 unit/L    Alanine Aminotransferase 22 0 - 55 unit/L    Aspartate Aminotransferase 25 5 - 34 unit/L    eGFR >60 mls/min/1.73/m2   TSH    Collection Time: 01/14/23  7:43 PM   Result Value Ref Range    Thyroid Stimulating Hormone 1.522 0.350 - 4.940 uIU/mL   Ethanol    Collection Time: 01/14/23  7:43 PM   Result Value Ref Range    Ethanol Level <10.0 <=10.0 mg/dL   Acetaminophen level    Collection Time: 01/14/23  7:43 PM   Result Value Ref Range    Acetaminophen Level <17.4 (L) 17.4 - 30.0 ug/ml   CBC with Differential    Collection Time: 01/14/23  7:43 PM   Result Value Ref Range    WBC 9.7 4.5 - 11.5 x10(3)/mcL    RBC 4.31 (L) 4.70 - 6.10 x10(6)/mcL    Hgb 12.6 (L) 14.0 - 18.0 gm/dL    Hct 40.9 (L) 42.0 - 52.0 %    MCV 94.9 (H) 80.0 - 94.0 fL    MCH 29.2 pg    MCHC 30.8 (L) 33.0 - 36.0 mg/dL    RDW 14.1 11.5 - 17.0 %    Platelet 250 130 - 400 x10(3)/mcL    MPV 11.9 (H) 7.4 - 10.4 fL    IG# 0.02 0 - 0.04 x10(3)/mcL    IG% 0.2 %    NRBC% 0.0 %   Manual Differential    Collection Time: 01/14/23  7:43 PM   Result Value Ref Range    Neut Man 64 47 - 80 %    Lymph Man 21 13 - 40 %    Monocyte Man 12 (H) 2 - 11 %    Eos Man 3 0 - 8 %    Abs Neut calc 6.208 2.1 - 9.2 x10(3)/mcL    Abs Mono 1.164 0.1 - 1.3 x10(3)/mcL    Abs Lymp 2.037 0.6 - 4.6 x10(3)/mcL    Abs Eos  0.291 0 - 0.9 x10(3)/mcL    RBC Morph Normal Normal    Platelet Est Adequate Normal, Adequate   Urinalysis, Reflex to Urine Culture Urine, Clean Catch    Collection Time: 01/14/23  7:44 PM    Specimen:  Urine   Result Value Ref Range    Color, UA Colorless (A) Yellow, Light-Yellow, Dark Yellow, Jada, Straw    Appearance, UA Clear Clear    Specific Gravity, UA 1.011     pH, UA 7.0 5.0 - 8.5    Protein, UA Negative Negative mg/dL    Glucose, UA Normal Negative, Normal mg/dL    Ketones, UA Negative Negative mg/dL    Blood, UA Negative Negative unit/L    Bilirubin, UA Negative Negative mg/dL    Urobilinogen, UA Normal 0.2, 1.0, Normal mg/dL    Nitrites, UA Negative Negative    Leukocyte Esterase, UA Negative Negative unit/L    WBC, UA 0-5 None Seen, 0-2, 3-5, 0-5 /HPF    Bacteria, UA None Seen None Seen /HPF    Squamous Epithelial Cells, UA None Seen None Seen /HPF    Hyaline Casts, UA None Seen None Seen /lpf    RBC, UA 0-5 None Seen, 0-2, 3-5, 0-5 /HPF   Drug Screen, Urine    Collection Time: 01/14/23  7:44 PM   Result Value Ref Range    Amphetamines, Urine Negative Negative    Barbituates, Urine Negative Negative    Benzodiazepine, Urine Negative Negative    Cannabinoids, Urine Negative Negative    Cocaine, Urine Positive (A) Negative    Fentanyl, Urine Negative Negative    MDMA, Urine Negative Negative    Opiates, Urine Negative Negative    Phencyclidine, Urine Negative Negative    pH, Urine 7.0 3.0 - 11.0    Specific Gravity, Urine Auto 1.011 1.001 - 1.035   COVID/FLU A&B PCR    Collection Time: 01/14/23  7:44 PM   Result Value Ref Range    Influenza A PCR Not Detected Not Detected    Influenza B PCR Not Detected Not Detected    SARS-CoV-2 PCR Not Detected Not Detected, Negative, Invalid      No results found for: PHENYTOIN, PHENOBARB, VALPROATE, CBMZ        Psychiatric Mental Status Exam:  General Appearance: appears stated age, well developed and nourished, adequately groomed and appropriately dressed, in no acute distress  Arousal: alert with clear sensorium  Behavior: normal; cooperative; reasonably friendly, pleasant, and polite; appropriate eye-contact; under good behavioral control  Movements and Motor  Activity: no abnormal involuntary movements noted; no tics, no tremors, no akathisia, no dystonia, no evidence of tardive dyskinesia; no psychomotor agitation or retardation  Orientation: intact; oriented fully to person, place, time and situation  Speech: intact; normal rate, rhythm, volume, tone and pitch; conversational, spontaneous, and coherent  Mood: Depressed  Affect: mood-congruent  Thought Process: intact, linear, goal-directed, organized, logical  Associations: intact, no loosening of associations  Thought Content and Perceptions: no suicidal ideation, no homicidal ideation, + auditory hallucinations, + visual hallucinations, + delusions, no paranoid ideation  Recent and Remote Memory: grossly intact, able to recall relevant and salient information from the recent and remote past  Attention and Concentration: grossly intact, attentive to the conversation and not readily distractible  Fund of Knowledge: grossly intact, used appropriate vocabulary and demonstrated an awareness of current events  Insight: intact  Judgment: intact      Patient Strengths:  Access to care, Able to verbalize needs, Stable physical health, Motivation for treatment, and Capable of independent living      Patient Liabilities:  Medication non-compliance, Substance use, Depression, Psychosis, and Chronic psychiatric illness      Discharge Criteria:  Improved mood, Improved thought process, Medication compliance, Overall functional improvement, and Motivation for outpatient counseling    ASSESSMENT/PLAN:   Diagnosis:  Cocaine Abuse  F14.10   Schziophrenia: Undifferentiated Type  F20.9        Past Medical History:   Diagnosis Date    Bipolar disorder, unspecified     Schizophrenia, unspecified           Plan:  -Admit to [facility]  -Start Risperdal 0.5 mg BID  -Start Zoloft 50 mg  -Will attempt to obtain outside psychiatric records if available  -SW to assist with aftercare planning and collateral  -Once stable discharge home with outpatient  follow up care and/or rehab  -Continue inpatient treatment under a PEC and/or CEC for danger to self/ danger to others/grave disability as evidenced by hallucinations      Estimated length of stay: 5-7    Estimated Disposition: Home    Estimated Follow-up: Outpatient medication management      On this date, I have reviewed the medical history and Nursing Assessment, as well as records from referral source.  I have evaluated the mental status of the above named person and concur with the findings of all assessments.  I have provided medical direction for the development of the Treatment Plan.    I conclude that this patient meets admission criteria for inpatient treatment.  I certify that this patient poses a danger to self or others, or would otherwise be considered gravely disabled based on this assessment and/or provided collateral information.     I have provided medical direction for the development of the Treatment plan.  These services will be provided while this patient is under my care and will be based on an individualized plan of care.  The patient can demonstrate a reasonable expectation of improvement in his/her disorder as a result of the active treatment being provided.      Gabino Crowell PMHNP-BC

## 2023-01-15 NOTE — PLAN OF CARE
Problem: Adult Inpatient Plan of Care  Goal: Plan of Care Review  Outcome: Ongoing, Progressing  Goal: Patient-Specific Goal (Individualized)  Outcome: Ongoing, Progressing  Goal: Absence of Hospital-Acquired Illness or Injury  Outcome: Ongoing, Progressing  Goal: Optimal Comfort and Wellbeing  Outcome: Ongoing, Progressing  Goal: Readiness for Transition of Care  Outcome: Ongoing, Progressing     Problem: Activity and Energy Impairment (Excessive Substance Use)  Goal: Optimized Energy Level (Excessive Substance Use)  Outcome: Ongoing, Progressing     Problem: Behavior Regulation Impairment (Excessive Substance Use)  Goal: Improved Behavioral Control (Excessive Substance Use)  Outcome: Ongoing, Progressing     Problem: Decreased Participation and Engagement (Excessive Substance Use)  Goal: Increased Participation and Engagement (Excessive Substance Use)  Outcome: Ongoing, Progressing     Problem: Physiologic Impairment (Excessive Substance Use)  Goal: Improved Physiologic Symptoms (Excessive Substance Use)  Outcome: Ongoing, Progressing     Problem: Social, Occupational or Functional Impairment (Excessive Substance Use)  Goal: Enhanced Social, Occupational or Functional Skills (Excessive Substance Use)  Outcome: Ongoing, Progressing

## 2023-01-16 PROCEDURE — 11400000 HC PSYCH PRIVATE ROOM

## 2023-01-16 PROCEDURE — 25000003 PHARM REV CODE 250

## 2023-01-16 RX ADMIN — SERTRALINE HYDROCHLORIDE 50 MG: 50 TABLET ORAL at 08:01

## 2023-01-16 RX ADMIN — RISPERIDONE 0.5 MG: 0.25 TABLET ORAL at 08:01

## 2023-01-16 NOTE — PROGRESS NOTES
1/16/2023 11:06 AM   Kevin Dukes   1970   [id number]        Psychiatry Progress Note     SUBJECTIVE:   Kevin Dukes is a 52 y.o. male placed under a PEC at [facility] auditory and visual hallucinations.  He states that he was recently at [facility] in [place] and was discharged 2 weeks ago.  He was discharged on Seroquel at the time.  Labile, agitated, and anxious while in the ED.  Started on Zoloft and Risperdal yesterday.  Reports significant improvement in hallucinations and mood.  Will plan to titrate Zoloft during his stay.  He would like aftercare with [facility] at discharge.    UDS: (+)cocaine  Blood alcohol: <10       Current Medications:   Scheduled Meds:    nicotine  1 patch Transdermal Daily    risperiDONE  0.5 mg Oral BID    sertraline  50 mg Oral Daily      PRN Meds: acetaminophen, aluminum-magnesium hydroxide-simethicone, haloperidoL **AND** diphenhydrAMINE **AND** haloperidol lactate **AND** diphenhydrAMINE **AND** lorazepam, hydrOXYzine HCL, magnesium hydroxide 400 mg/5 ml, ondansetron, promethazine, trazodone   Psychotherapeutics (From admission, onward)      Start     Stop Route Frequency Ordered    01/16/23 0000  haloperidoL tablet 10 mg  (Med - Acute  Behavioral Management)        Question:  Is the patient competent?  Answer:  Yes   See Hyperspace for full Linked Orders Report.    -- Oral Every 6 hours PRN 01/15/23 0031    01/16/23 0000  haloperidol lactate injection 10 mg  (Med - Acute  Behavioral Management)        See Hyperspace for full Linked Orders Report.    -- IM Every 6 hours PRN 01/15/23 0031    01/16/23 0000  LORazepam injection 2 mg  (Med - Acute  Behavioral Management)        Question:  Is the patient competent?  Answer:  Yes   See Hyperspace for full Linked Orders Report.    -- IM Every 6 hours PRN 01/15/23 0031    01/15/23 1245  risperiDONE tablet 0.5 mg         -- Oral 2 times daily 01/15/23 1140    01/15/23 1245  sertraline tablet 50 mg         -- Oral  "Daily 01/15/23 1140    01/15/23 0033  traZODone tablet 100 mg        Question:  Is the patient competent?  Answer:  Yes    -- Oral Nightly PRN 01/15/23 0031            Allergies:   Review of patient's allergies indicates:  No Known Allergies     OBJECTIVE:   Vitals   Vitals:    01/15/23 1900   BP: 130/86   Pulse: 101   Resp: 20   Temp: 97.5 °F (36.4 °C)        Labs/Imaging/Studies:   No results found for this or any previous visit (from the past 36 hour(s)).       Medical Review Of Systems:  Constitutional: negative  Respiratory: negative  Cardiovascular: negative  Gastrointestinal: negative  Genitourinary:negative  Musculoskeletal:negative  Neurological: negative      Psychiatric Mental Status Exam:  General Appearance: appears stated age, well-developed, well-nourished  Arousal: alert  Behavior: cooperative  Movements and Motor Activity: no abnormal involuntary movements noted  Orientation: oriented to person, place, time, and situation  Speech: normal rate, normal rhythm, normal volume, normal tone  Mood: "Better"  Affect: Anxious, improving  Thought Process: linear  Associations: intact  Thought Content and Perceptions: (+)recent auditory and visual hallucinations, no suicidal ideation, no homicidal ideation  Recent and Remote Memory: recent memory intact, remote memory intact  Attention and Concentration: intact  Fund of Knowledge: intact  Insight: intact  Judgment: questionable    ASSESSMENT/PLAN:   Diagnosis:  Unspecified Psychotic Disorder (F29)  Unspecified Anxiety Disorder (F41.9)  Cocaine use disorder (F14.10)    Past Medical History:   Diagnosis Date    Bipolar disorder, unspecified     Schizophrenia, unspecified         Plan:  -Continue Zoloft and Risperdal started yesterday.  Will titrate as needed    Expected Disposition Plan: Home        Juan M Anand M.D.  "

## 2023-01-17 LAB
CHOLEST SERPL-MCNC: 181 MG/DL
CHOLEST/HDLC SERPL: 3 {RATIO} (ref 0–5)
EST. AVERAGE GLUCOSE BLD GHB EST-MCNC: 102.5 MG/DL
HBA1C MFR BLD: 5.2 %
HDLC SERPL-MCNC: 63 MG/DL (ref 35–60)
LDLC SERPL CALC-MCNC: 107 MG/DL (ref 50–140)
T PALLIDUM AB SER QL: NONREACTIVE
TRIGL SERPL-MCNC: 57 MG/DL (ref 34–140)
TSH SERPL-ACNC: 1.03 UIU/ML (ref 0.35–4.94)
VLDLC SERPL CALC-MCNC: 11 MG/DL

## 2023-01-17 PROCEDURE — 36415 COLL VENOUS BLD VENIPUNCTURE: CPT | Performed by: PSYCHIATRY & NEUROLOGY

## 2023-01-17 PROCEDURE — 25000003 PHARM REV CODE 250

## 2023-01-17 PROCEDURE — 86780 TREPONEMA PALLIDUM: CPT | Performed by: PSYCHIATRY & NEUROLOGY

## 2023-01-17 PROCEDURE — 84443 ASSAY THYROID STIM HORMONE: CPT | Performed by: PSYCHIATRY & NEUROLOGY

## 2023-01-17 PROCEDURE — 11400000 HC PSYCH PRIVATE ROOM

## 2023-01-17 PROCEDURE — 83036 HEMOGLOBIN GLYCOSYLATED A1C: CPT | Performed by: PSYCHIATRY & NEUROLOGY

## 2023-01-17 PROCEDURE — 80061 LIPID PANEL: CPT | Performed by: PSYCHIATRY & NEUROLOGY

## 2023-01-17 RX ADMIN — SERTRALINE HYDROCHLORIDE 50 MG: 50 TABLET ORAL at 08:01

## 2023-01-17 RX ADMIN — RISPERIDONE 0.5 MG: 0.25 TABLET ORAL at 08:01

## 2023-01-17 NOTE — PLAN OF CARE
"Psychosocial Assessment     Pt is a 52 y.o. YO  male admitted due to Psychosis. Pt UDS was Positive for cocaine. Pt ETOH < 10. Pt admits to using cocaine and marijuana. Pt denies  SI, HI, and AVH at this time. Pt last inpatient  was 2 weeks ago for similar reasons . Pt presents Cooperative with CM staff 1:1. Pt states that he believes his hallucinations were drug induced. Pt denies depression.   Pt has  4 dependents. Pt  is Single .  Pt completed Some college. Pt 0  service.  Pt denies financial issues. Pt self-employed.  Pt works at [employer]. Pt denies legal issues. Pt states they do not receive comfort from spiritual practices. Pt emergency contact is Moo Sheppard. Their phone number is  [phone]. Pt discharge plan at this time is home. Pt denies rehab and outpatient services.      01/17/23 1540   Initial Information   Source of Information patient   Reason for Admission Psychosis   Patient Aware of Diagnosis yes   Limitations on Visitors/Phone Calls none   Temporary Family Living Arrangements (While Hospitalized) none needed   Arrived From emergency department   Current or Previous  Service none   Mutuality/Individual Preferences   Anxieties, Fears or Concerns "No."   Spiritual Beliefs   Spiritual, Cultural Beliefs, Cheondoism Practices, Values that Affect Care no   Pastoral Care/Clergy/ Contact Status none needed   Hospital  Requested no   Coping/Stress/Tolerance   Major Change/Loss/Stressor/Fears denies   Relationship/Environment   Primary Source of Support/Comfort sibling(s)   Name of Support/Comfort Primary Source Moo Ames  ([phone])   Resource/Environmental Concerns   Current Living Arrangements home   Substance Use/Withdrawal   Substance Use Current, used prior to admission   Additional Tobacco Use   How many cigarettes do you typically have per day? 0   Abuse Screen (yes response referral indicated)   Feels Unsafe at Home or " Work/School no   Feels Threatened by Someone no   Does anyone try to keep you from having contact with others or doing things outside your home? no   Physical Signs of Abuse Present no   OTHER   Feels Like Hurting Others no   Violence Risk   Previous Attempt to Harm Others no   Depression Screen (Over the Past Two Weeks)   Have You Felt Down, Depressed or Hopeless? no   Have You Felt Little Interest or Pleasure in Doing Things? no   AUDIT-C (Alcohol Use Disorders ID Test)   Alcohol Use In Past Year 0-->never   Alcohol Amount Per Day In Past Year 0-->none   More Than 6 Drinks On One Occasion In Past Year 0-->never   Total Audit C Score 0   Transition Planning   Patient/Family Anticipates Transition to home   Patient/Family Anticipated Services at Transition none   Transportation Anticipated health plan transportation

## 2023-01-17 NOTE — PROGRESS NOTES
1/17/2023 11:06 AM   Kevin Dukes   1970   [id number]        Psychiatry Progress Note     SUBJECTIVE:   Kevin Dukes is a 52 y.o. male placed under a PEC at [facility] auditory and visual hallucinations.  He states that he was recently at [facility] in [place] and was discharged 2 weeks ago.  He was discharged on Seroquel at the time.  Labile, agitated, and anxious while in the ED.  Started on Zoloft and Risperdal yesterday. Continues to report improvement in hallucinations and mood.  Denying any audio hallucinations today. Will continue with current POC    UDS: (+)cocaine  Blood alcohol: <10       Current Medications:   Scheduled Meds:    nicotine  1 patch Transdermal Daily    risperiDONE  0.5 mg Oral BID    sertraline  50 mg Oral Daily      PRN Meds: acetaminophen, aluminum-magnesium hydroxide-simethicone, haloperidoL **AND** diphenhydrAMINE **AND** haloperidol lactate **AND** diphenhydrAMINE **AND** lorazepam, hydrOXYzine HCL, magnesium hydroxide 400 mg/5 ml, ondansetron, promethazine, trazodone   Psychotherapeutics (From admission, onward)      Start     Stop Route Frequency Ordered    01/16/23 0000  haloperidoL tablet 10 mg  (Med - Acute  Behavioral Management)        Question:  Is the patient competent?  Answer:  Yes   See Hyperspace for full Linked Orders Report.    -- Oral Every 6 hours PRN 01/15/23 0031    01/16/23 0000  haloperidol lactate injection 10 mg  (Med - Acute  Behavioral Management)        See Hyperspace for full Linked Orders Report.    -- IM Every 6 hours PRN 01/15/23 0031    01/16/23 0000  LORazepam injection 2 mg  (Med - Acute  Behavioral Management)        Question:  Is the patient competent?  Answer:  Yes   See Hyperspace for full Linked Orders Report.    -- IM Every 6 hours PRN 01/15/23 0031    01/15/23 1245  risperiDONE tablet 0.5 mg         -- Oral 2 times daily 01/15/23 1140    01/15/23 1245  sertraline tablet 50 mg         -- Oral Daily 01/15/23 1140    01/15/23 0033   "traZODone tablet 100 mg        Question:  Is the patient competent?  Answer:  Yes    -- Oral Nightly PRN 01/15/23 0031            Allergies:   Review of patient's allergies indicates:  No Known Allergies     OBJECTIVE:   Vitals   Vitals:    01/16/23 1600   BP: 127/77   Pulse: 66   Resp: 18   Temp: 98.2 °F (36.8 °C)        Labs/Imaging/Studies:   Recent Results (from the past 36 hour(s))   TSH    Collection Time: 01/17/23  7:10 AM   Result Value Ref Range    Thyroid Stimulating Hormone 1.028 0.350 - 4.940 uIU/mL   Hemoglobin A1C    Collection Time: 01/17/23  7:10 AM   Result Value Ref Range    Hemoglobin A1c 5.2 <=7.0 %    Estimated Average Glucose 102.5 mg/dL   Lipid Panel    Collection Time: 01/17/23  7:10 AM   Result Value Ref Range    Cholesterol Total 181 <=200 mg/dL    HDL Cholesterol 63 (H) 35 - 60 mg/dL    Triglyceride 57 34 - 140 mg/dL    Cholesterol/HDL Ratio 3 0 - 5    Very Low Density Lipoprotein 11     LDL Cholesterol 107.00 50.00 - 140.00 mg/dL          Medical Review Of Systems:  Constitutional: negative  Respiratory: negative  Cardiovascular: negative  Gastrointestinal: negative  Genitourinary:negative  Musculoskeletal:negative  Neurological: negative      Psychiatric Mental Status Exam:  General Appearance: appears stated age, well-developed, well-nourished  Arousal: alert  Behavior: cooperative  Movements and Motor Activity: no abnormal involuntary movements noted  Orientation: oriented to person, place, time, and situation  Speech: normal rate, normal rhythm, normal volume, normal tone  Mood: "Better"  Affect: Anxious, improving  Thought Process: linear  Associations: intact  Thought Content and Perceptions: (+)recent auditory and visual hallucinations, no suicidal ideation, no homicidal ideation  Recent and Remote Memory: recent memory intact, remote memory intact  Attention and Concentration: intact  Fund of Knowledge: intact  Insight: intact  Judgment: questionable    ASSESSMENT/PLAN: "   Diagnosis:  Unspecified Psychotic Disorder (F29)  Unspecified Anxiety Disorder (F41.9)  Cocaine use disorder (F14.10)    Past Medical History:   Diagnosis Date    Bipolar disorder, unspecified     Schizophrenia, unspecified         Plan:  -Continue Zoloft and Risperdal started yesterday.  Will titrate as needed    Expected Disposition Plan: Home        Gabino Crowell, [id number]

## 2023-01-18 PROCEDURE — 11400000 HC PSYCH PRIVATE ROOM

## 2023-01-18 PROCEDURE — 25000003 PHARM REV CODE 250

## 2023-01-18 RX ORDER — SERTRALINE HYDROCHLORIDE 50 MG/1
100 TABLET, FILM COATED ORAL DAILY
Status: DISCONTINUED | OUTPATIENT
Start: 2023-01-19 | End: 2023-01-20 | Stop reason: HOSPADM

## 2023-01-18 RX ADMIN — RISPERIDONE 0.5 MG: 0.25 TABLET ORAL at 09:01

## 2023-01-18 RX ADMIN — SERTRALINE HYDROCHLORIDE 50 MG: 50 TABLET ORAL at 09:01

## 2023-01-18 RX ADMIN — RISPERIDONE 0.5 MG: 0.25 TABLET ORAL at 08:01

## 2023-01-18 NOTE — PROGRESS NOTES
01/17/23 1114   Pain/Comfort/Sleep   Preferred Pain Scale number (Numeric Rating Pain Scale)   Pain Rating (0-10): Rest 0   Behavioral   General Appearance [WDL Definition: Well-kept, clean; dress appropriate for weather/appropriate for setting] WDL except   General Appearance unkempt   Behavior WDL   Behavior [WDL Definition: Appropriate to situation, cooperative, appropriate eye contact; erect posture, head raised, steady gait; no unusual gestures/mannerisms] WDL except   Behavior Interactions cooperative   Motor Movement no unusual gestures/mannerisms   Emotion Mood WDL   Emotion/Mood/Affect [WDL Definition: Calm; euthymic; affect consistent with mood; facial expression relaxed, appropriate to situation] WDL except   Affect flat   Speech WDL   Speech [WDL Definition: Moderate rate and volume; clear, coherent; articulate; effective] WDL   Speech Symptoms moderate rate and volume   Perceptual State WDL   Perceptual State [WDL Definition: Consistent with reality; denies hallucinations] WDL except   Hallucinations auditory   Perceptual State derealization   Thought Process WDL   Thought Process [WDL Definition: Judgment and insight appropriate to situation; logical, relevant, and linear thought process] WDL except   Delusions no delusions   Judgment and Insight insight not appropriate to situation   Thought Content hopelessness;helplessness   Thought Process Symptoms illogical   Intellectual Performance WDL   Intellectual Performance [WDL Definition: Alert, oriented x 4; immediate, recent and remote memory intact; able to comprehend] WDL   Intellectual Performance Symptoms oriented x 4   Level of Consciousness (AVPU) alert   Cognitive   Cognitive/Neuro/Behavioral WDL WDL   Safety   Observed Behavior calm   [place] Psychiatric Fall Risk Tool   Age 8-->Less than 50   Mental Status -4-->Fully alert/oriented at all times   Elimination 8-->Independent with control of bowel/bladder   Medications 8-->Psychotropic  medications   Diagnosis 10-->Bipolar/schizoaffective disorder   Ambulation/Balance 7-->Independent/steady gait/immobile   Nutrition 0-->No apparent abnormalities with appetite   Sleep Disturbance 8-->No sleep disturbance   History of Falls 8-->No history of falls   Score ([place] Fall Risk) 53   Violence Risk   Feels Like Hurting Others no   Previous Attempt to Harm Others no   Safety Management    Safety Promotion/Fall Prevention nonskid shoes/socks when out of bed   Gastrointestinal   GI WDL ex   Genitourinary   Genitourinary WDL WDL   Skin   Skin WDL WDL   [place] Risk Assessment   Sensory Perception 4-->no impairment   Moisture 4-->rarely moist   Activity 4-->walks frequently   Mobility 4-->no limitation   Nutrition 3-->adequate   Friction and Shear 3-->no apparent problem   [place] Score 22

## 2023-01-18 NOTE — CARE UPDATE
Treatment Team    Pt seem for treatment team today with interdisciplinary team.  Pt is Cooperative with Tx team. Pt denies symptoms at this time. MD did not change pt meds at this time. Treatment teams goals Not met at this time. Pt DC plan is home. DC date scheduled for friday.

## 2023-01-18 NOTE — PLAN OF CARE
Problem: Activity and Energy Impairment (Excessive Substance Use)  Goal: Optimized Energy Level (Excessive Substance Use)  Outcome: Ongoing, Progressing     Problem: Behavior Regulation Impairment (Excessive Substance Use)  Goal: Improved Behavioral Control (Excessive Substance Use)  Outcome: Ongoing, Progressing     Problem: Decreased Participation and Engagement (Excessive Substance Use)  Goal: Increased Participation and Engagement (Excessive Substance Use)  Outcome: Ongoing, Progressing     Problem: Physiologic Impairment (Excessive Substance Use)  Goal: Improved Physiologic Symptoms (Excessive Substance Use)  Outcome: Ongoing, Progressing     Problem: Social, Occupational or Functional Impairment (Excessive Substance Use)  Goal: Enhanced Social, Occupational or Functional Skills (Excessive Substance Use)  Outcome: Ongoing, Progressing     Problem: Activity and Energy Impairment (Depressive Signs/Symptoms)  Goal: Optimized Energy Level (Depressive Signs/Symptoms)  Outcome: Ongoing, Progressing     Problem: Cognitive Impairment (Depressive Signs/Symptoms)  Goal: Optimized Cognitive Function  Outcome: Ongoing, Progressing     Problem: Decreased Participation/Engagement (Depressive Signs/Symptoms)  Goal: Increased Participation and Engagement (Depressive Signs/Symptoms)  Outcome: Ongoing, Progressing     Problem: Feelings of Worthlessness, Hopelessness or Excessive Guilt (Depressive Signs/Symptoms)  Goal: Enhanced Self-Esteem and Confidence (Depressive Signs/Symptoms)  Outcome: Ongoing, Progressing     Problem: Mood Impairment (Depressive Signs/Symptoms)  Goal: Improved Mood Symptoms (Depressive Signs/Symptoms)  Outcome: Ongoing, Progressing     Problem: Nutrition Imbalance (Depressive Signs/Symptoms)  Goal: Optimized Nutrition Intake  Outcome: Ongoing, Progressing     Problem: Psychomotor Impairment (Depressive Signs/Symptoms)  Goal: Improved Psychomotor Symptoms (Depressive Signs/Symptoms)  Outcome: Ongoing,  Progressing     Problem: Sleep Disturbance (Depressive Signs/Symptoms)  Goal: Improved Sleep (Depressive Signs/Symptoms)  Outcome: Ongoing, Progressing     Problem: Social, Occupational or Functional Impairment (Depressive Signs/Symptoms)  Goal: Enhanced Social, Occupational or Functional Skills (Depressive Signs/Symptoms)  Outcome: Ongoing, Progressing     Problem: Behavior Regulation Impairment (Psychotic Signs/Symptoms)  Goal: Improved Behavioral Control (Psychotic Signs/Symptoms)  Outcome: Ongoing, Progressing     Problem: Cognitive Impairment (Psychotic Signs/Symptoms)  Goal: Optimal Cognitive Function (Psychotic Signs/Symptoms)  Outcome: Ongoing, Progressing     Problem: Decreased Participation and Engagement (Psychotic Signs/Symptoms)  Goal: Increased Participation and Engagement (Psychotic Signs/Symptoms)  Outcome: Ongoing, Progressing     Problem: Mood Impairment (Psychotic Signs/Symptoms)  Goal: Improved Mood Symptoms (Psychotic Signs/Symptoms)  Outcome: Ongoing, Progressing     Problem: Psychomotor Impairment (Psychotic Signs/Symptoms)  Goal: Improved Psychomotor Symptoms (Psychotic Signs/Symptoms)  Outcome: Ongoing, Progressing     Problem: Sensory Perception Impairment (Psychotic Signs/Symptoms)  Goal: Decreased Sensory Symptoms (Psychotic Signs/Symptoms)  Outcome: Ongoing, Progressing     Problem: Sleep Disturbance (Psychotic Signs/Symptoms)  Goal: Improved Sleep (Psychotic Signs/Symptoms)  Outcome: Ongoing, Progressing     Problem: Social, Occupational or Functional Impairment (Psychotic Signs/Symptoms)  Goal: Enhanced Social, Occupational or Functional Skills (Psychotic Signs/Symptoms)  Outcome: Ongoing, Progressing     Problem: Violence Risk or Actual  Goal: Anger and Impulse Control  Outcome: Ongoing, Progressing

## 2023-01-18 NOTE — PROGRESS NOTES
"1/18/2023 11:06 AM   Kevin Dukes   1970   [id number]        Psychiatry Progress Note     SUBJECTIVE:   Kevin Dukes is a 52 y.o. male placed under a PEC at [facility] auditory and visual hallucinations.  Reported to staff that auditory hallucinations are improving.  He is not interested in substance rehab.  Plans to return home at discharge.  Reports that he plans to go to "outpatient classes."  When we offered to set him up with [facility], he states, "I'm going to go on my own."  Will titrate sertraline today and will plan for discharge on 1/20.    UDS: (+)cocaine  Blood alcohol: <10       Current Medications:   Scheduled Meds:    nicotine  1 patch Transdermal Daily    risperiDONE  0.5 mg Oral BID    sertraline  50 mg Oral Daily      PRN Meds: acetaminophen, aluminum-magnesium hydroxide-simethicone, haloperidoL **AND** diphenhydrAMINE **AND** haloperidol lactate **AND** diphenhydrAMINE **AND** lorazepam, hydrOXYzine HCL, magnesium hydroxide 400 mg/5 ml, ondansetron, promethazine, trazodone   Psychotherapeutics (From admission, onward)      Start     Stop Route Frequency Ordered    01/16/23 0000  haloperidoL tablet 10 mg  (Med - Acute  Behavioral Management)        Question:  Is the patient competent?  Answer:  Yes   See Hyperspace for full Linked Orders Report.    -- Oral Every 6 hours PRN 01/15/23 0031    01/16/23 0000  haloperidol lactate injection 10 mg  (Med - Acute  Behavioral Management)        See Hyperspace for full Linked Orders Report.    -- IM Every 6 hours PRN 01/15/23 0031    01/16/23 0000  LORazepam injection 2 mg  (Med - Acute  Behavioral Management)        Question:  Is the patient competent?  Answer:  Yes   See Hyperspace for full Linked Orders Report.    -- IM Every 6 hours PRN 01/15/23 0031    01/15/23 1245  risperiDONE tablet 0.5 mg         -- Oral 2 times daily 01/15/23 1140    01/15/23 1245  sertraline tablet 50 mg         -- Oral Daily 01/15/23 1140    01/15/23 0033  traZODone tablet " "100 mg        Question:  Is the patient competent?  Answer:  Yes    -- Oral Nightly PRN 01/15/23 0031            Allergies:   Review of patient's allergies indicates:  No Known Allergies     OBJECTIVE:   Vitals   Vitals:    01/18/23 0700   BP: (!) 147/80   Pulse: (!) 58   Resp: 18   Temp: 97.9 °F (36.6 °C)        Labs/Imaging/Studies:   Recent Results (from the past 36 hour(s))   TSH    Collection Time: 01/17/23  7:10 AM   Result Value Ref Range    Thyroid Stimulating Hormone 1.028 0.350 - 4.940 uIU/mL   Hemoglobin A1C    Collection Time: 01/17/23  7:10 AM   Result Value Ref Range    Hemoglobin A1c 5.2 <=7.0 %    Estimated Average Glucose 102.5 mg/dL   Lipid Panel    Collection Time: 01/17/23  7:10 AM   Result Value Ref Range    Cholesterol Total 181 <=200 mg/dL    HDL Cholesterol 63 (H) 35 - 60 mg/dL    Triglyceride 57 34 - 140 mg/dL    Cholesterol/HDL Ratio 3 0 - 5    Very Low Density Lipoprotein 11     LDL Cholesterol 107.00 50.00 - 140.00 mg/dL   SYPHILIS ANTIBODY (WITH REFLEX RPR)    Collection Time: 01/17/23  7:10 AM   Result Value Ref Range    Syphilis Antibody Nonreactive Nonreactive, Equivocal          Medical Review Of Systems:  Constitutional: negative  Respiratory: negative  Cardiovascular: negative  Gastrointestinal: negative  Genitourinary:negative  Musculoskeletal:negative  Neurological: negative      Psychiatric Mental Status Exam:  General Appearance: appears stated age, well-developed, well-nourished  Arousal: alert  Behavior: cooperative  Movements and Motor Activity: no abnormal involuntary movements noted  Orientation: oriented to person, place, time, and situation  Speech: normal rate, normal rhythm, normal volume, normal tone  Mood: "Getting better"  Affect: Anxious, improving  Thought Process: linear  Associations: intact  Thought Content and Perceptions: auditory and visual hallucinations improving, no suicidal ideation, no homicidal ideation  Recent and Remote Memory: recent memory intact, " remote memory intact  Attention and Concentration: intact  Fund of Knowledge: intact  Insight: intact  Judgment: questionable    ASSESSMENT/PLAN:   Diagnosis:  Unspecified Psychotic Disorder (F29)  Unspecified Anxiety Disorder (F41.9)  Cocaine use disorder (F14.10)    Past Medical History:   Diagnosis Date    Bipolar disorder, unspecified     Schizophrenia, unspecified         Plan:  -Increase sertraline to 100mg daily    Expected Disposition Plan: Home        Juan M Anand M.D.

## 2023-01-18 NOTE — PLAN OF CARE
Problem: Adult Inpatient Plan of Care  Goal: Plan of Care Review  Outcome: Met  Goal: Patient-Specific Goal (Individualized)  Outcome: Met  Goal: Absence of Hospital-Acquired Illness or Injury  Outcome: Met  Goal: Optimal Comfort and Wellbeing  Outcome: Met  Goal: Readiness for Transition of Care  Outcome: Met     Problem: Activity and Energy Impairment (Excessive Substance Use)  Goal: Optimized Energy Level (Excessive Substance Use)  Outcome: Ongoing, Progressing     Problem: Behavior Regulation Impairment (Excessive Substance Use)  Goal: Improved Behavioral Control (Excessive Substance Use)  Outcome: Ongoing, Progressing     Problem: Decreased Participation and Engagement (Excessive Substance Use)  Goal: Increased Participation and Engagement (Excessive Substance Use)  Outcome: Ongoing, Progressing     Problem: Physiologic Impairment (Excessive Substance Use)  Goal: Improved Physiologic Symptoms (Excessive Substance Use)  Outcome: Ongoing, Progressing     Problem: Social, Occupational or Functional Impairment (Excessive Substance Use)  Goal: Enhanced Social, Occupational or Functional Skills (Excessive Substance Use)  Outcome: Ongoing, Progressing     Problem: Activity and Energy Impairment (Depressive Signs/Symptoms)  Goal: Optimized Energy Level (Depressive Signs/Symptoms)  Outcome: Ongoing, Progressing     Problem: Cognitive Impairment (Depressive Signs/Symptoms)  Goal: Optimized Cognitive Function  Outcome: Ongoing, Progressing     Problem: Decreased Participation/Engagement (Depressive Signs/Symptoms)  Goal: Increased Participation and Engagement (Depressive Signs/Symptoms)  Outcome: Ongoing, Progressing     Problem: Feelings of Worthlessness, Hopelessness or Excessive Guilt (Depressive Signs/Symptoms)  Goal: Enhanced Self-Esteem and Confidence (Depressive Signs/Symptoms)  Outcome: Ongoing, Progressing     Problem: Mood Impairment (Depressive Signs/Symptoms)  Goal: Improved Mood Symptoms (Depressive  Signs/Symptoms)  Outcome: Ongoing, Progressing     Problem: Nutrition Imbalance (Depressive Signs/Symptoms)  Goal: Optimized Nutrition Intake  Outcome: Ongoing, Progressing     Problem: Psychomotor Impairment (Depressive Signs/Symptoms)  Goal: Improved Psychomotor Symptoms (Depressive Signs/Symptoms)  Outcome: Ongoing, Progressing     Problem: Sleep Disturbance (Depressive Signs/Symptoms)  Goal: Improved Sleep (Depressive Signs/Symptoms)  Outcome: Ongoing, Progressing     Problem: Social, Occupational or Functional Impairment (Depressive Signs/Symptoms)  Goal: Enhanced Social, Occupational or Functional Skills (Depressive Signs/Symptoms)  Outcome: Ongoing, Progressing     Problem: Behavior Regulation Impairment (Psychotic Signs/Symptoms)  Goal: Improved Behavioral Control (Psychotic Signs/Symptoms)  Outcome: Ongoing, Progressing     Problem: Cognitive Impairment (Psychotic Signs/Symptoms)  Goal: Optimal Cognitive Function (Psychotic Signs/Symptoms)  Outcome: Ongoing, Progressing     Problem: Decreased Participation and Engagement (Psychotic Signs/Symptoms)  Goal: Increased Participation and Engagement (Psychotic Signs/Symptoms)  Outcome: Ongoing, Progressing     Problem: Mood Impairment (Psychotic Signs/Symptoms)  Goal: Improved Mood Symptoms (Psychotic Signs/Symptoms)  Outcome: Ongoing, Progressing     Problem: Psychomotor Impairment (Psychotic Signs/Symptoms)  Goal: Improved Psychomotor Symptoms (Psychotic Signs/Symptoms)  Outcome: Ongoing, Progressing     Problem: Sensory Perception Impairment (Psychotic Signs/Symptoms)  Goal: Decreased Sensory Symptoms (Psychotic Signs/Symptoms)  Outcome: Ongoing, Progressing     Problem: Sleep Disturbance (Psychotic Signs/Symptoms)  Goal: Improved Sleep (Psychotic Signs/Symptoms)  Outcome: Ongoing, Progressing     Problem: Social, Occupational or Functional Impairment (Psychotic Signs/Symptoms)  Goal: Enhanced Social, Occupational or Functional Skills (Psychotic  Signs/Symptoms)  Outcome: Ongoing, Progressing

## 2023-01-18 NOTE — NURSING
Treatment team today.  He is refusing rehab and wants to be discharged home. He reports that his mom had  and he slipped up and started using again.  He will be following up with [facility].  Projected discharge is for Friday.He is agreeable with the current plan.

## 2023-01-19 PROBLEM — F29 PSYCHOSIS: Status: ACTIVE | Noted: 2023-01-19

## 2023-01-19 PROBLEM — F32.A DEPRESSION: Status: ACTIVE | Noted: 2023-01-19

## 2023-01-19 PROCEDURE — 11400000 HC PSYCH PRIVATE ROOM

## 2023-01-19 PROCEDURE — 25000003 PHARM REV CODE 250: Performed by: PSYCHIATRY & NEUROLOGY

## 2023-01-19 PROCEDURE — 25000003 PHARM REV CODE 250

## 2023-01-19 RX ORDER — RISPERIDONE 0.5 MG/1
0.5 TABLET ORAL 2 TIMES DAILY
Qty: 60 TABLET | Refills: 0 | Status: ON HOLD | OUTPATIENT
Start: 2023-01-19 | End: 2024-02-05 | Stop reason: HOSPADM

## 2023-01-19 RX ORDER — SERTRALINE HYDROCHLORIDE 100 MG/1
100 TABLET, FILM COATED ORAL DAILY
Qty: 30 TABLET | Refills: 0 | Status: ON HOLD | OUTPATIENT
Start: 2023-01-20 | End: 2024-02-05 | Stop reason: HOSPADM

## 2023-01-19 RX ADMIN — RISPERIDONE 0.5 MG: 0.25 TABLET ORAL at 08:01

## 2023-01-19 RX ADMIN — SERTRALINE HYDROCHLORIDE 100 MG: 50 TABLET ORAL at 08:01

## 2023-01-19 NOTE — GROUP NOTE
Group Psychotherapy       Group Focus: Stress Management      Number of patients in attendance: 7    Group Start Time: 1000  Group End Time:  1045  Groups Date: 1/19/2023  Group Topic:  Behavioral Health  Group Department: [facility]  Group Facilitators:  Nayla Barrientos  _____________________________________________________________________    Patient Name: Kevin Dukes  MRN: [id number]  Patient Class: IP- Psych   Admission Date\Time: 1/15/2023 12:30 AM  Hospital Length of Stay: 4  Primary Care Provider: Primary Doctor No     Referred by: Acute Psychiatry Unit Treatment Team     Target symptoms: Substance Abuse     Patient's response to treatment: Active Listening     Progress toward goals: Progressing well     Interval History:      Diagnosis:      Plan: Continue treatment on [facility]

## 2023-01-19 NOTE — PROGRESS NOTES
"1/19/2023 11:06 AM   Kevin Dukes   1970   [id number]        Psychiatry Progress Note     SUBJECTIVE:   Kevin Dukes is a 52 y.o. male placed under a PEC at [facility] auditory and visual hallucinations.  Reported to staff that auditory hallucinations are improving.  He is not interested in substance rehab.  Plans to return home at discharge.  Patient reports that his plan after discharge is to "stay off of drugs". When asked how he plans to do that he just states that he will stay off of them. Will proceed with DC tomorrow.     UDS: (+)cocaine  Blood alcohol: <10       Current Medications:   Scheduled Meds:    nicotine  1 patch Transdermal Daily    risperiDONE  0.5 mg Oral BID    sertraline  100 mg Oral Daily      PRN Meds: acetaminophen, aluminum-magnesium hydroxide-simethicone, haloperidoL **AND** diphenhydrAMINE **AND** haloperidol lactate **AND** diphenhydrAMINE **AND** lorazepam, hydrOXYzine HCL, magnesium hydroxide 400 mg/5 ml, ondansetron, promethazine, trazodone   Psychotherapeutics (From admission, onward)      Start     Stop Route Frequency Ordered    01/19/23 0900  sertraline tablet 100 mg         -- Oral Daily 01/18/23 1341    01/16/23 0000  haloperidoL tablet 10 mg  (Med - Acute  Behavioral Management)        Question:  Is the patient competent?  Answer:  Yes   See Hyperspace for full Linked Orders Report.    -- Oral Every 6 hours PRN 01/15/23 0031    01/16/23 0000  haloperidol lactate injection 10 mg  (Med - Acute  Behavioral Management)        See Hyperspace for full Linked Orders Report.    -- IM Every 6 hours PRN 01/15/23 0031    01/16/23 0000  LORazepam injection 2 mg  (Med - Acute  Behavioral Management)        Question:  Is the patient competent?  Answer:  Yes   See Hyperspace for full Linked Orders Report.    -- IM Every 6 hours PRN 01/15/23 0031    01/15/23 1245  risperiDONE tablet 0.5 mg         -- Oral 2 times daily 01/15/23 1140    01/15/23 0033  traZODone tablet 100 mg      " "  Question:  Is the patient competent?  Answer:  Yes    -- Oral Nightly PRN 01/15/23 0031            Allergies:   Review of patient's allergies indicates:  No Known Allergies     OBJECTIVE:   Vitals   Vitals:    01/18/23 1900   BP: (!) 165/80   Pulse: 82   Resp: 18   Temp: 97.7 °F (36.5 °C)        Labs/Imaging/Studies:   No results found for this or any previous visit (from the past 36 hour(s)).         Medical Review Of Systems:  Constitutional: negative  Respiratory: negative  Cardiovascular: negative  Gastrointestinal: negative  Genitourinary:negative  Musculoskeletal:negative  Neurological: negative      Psychiatric Mental Status Exam:  General Appearance: appears stated age, well-developed, well-nourished  Arousal: alert  Behavior: cooperative  Movements and Motor Activity: no abnormal involuntary movements noted  Orientation: oriented to person, place, time, and situation  Speech: normal rate, normal rhythm, normal volume, normal tone  Mood: "Getting better"  Affect: Anxious, improving  Thought Process: linear  Associations: intact  Thought Content and Perceptions: Denies auditory and visual hallucinations, no suicidal ideation, no homicidal ideation  Recent and Remote Memory: recent memory intact, remote memory intact  Attention and Concentration: intact  Fund of Knowledge: intact  Insight: intact  Judgment: questionable    ASSESSMENT/PLAN:   Diagnosis:  Unspecified Psychotic Disorder (F29)  Unspecified Anxiety Disorder (F41.9)  Cocaine use disorder (F14.10)    Past Medical History:   Diagnosis Date    Bipolar disorder, unspecified     Schizophrenia, unspecified         Plan:  -Continue with current POC  -Discharge home tomorrow    Expected Disposition Plan: Home        UNRULY Aranda-BC  "

## 2023-01-20 VITALS
OXYGEN SATURATION: 97 % | SYSTOLIC BLOOD PRESSURE: 170 MMHG | HEART RATE: 67 BPM | BODY MASS INDEX: 21.67 KG/M2 | WEIGHT: 168.88 LBS | DIASTOLIC BLOOD PRESSURE: 51 MMHG | TEMPERATURE: 98 F | HEIGHT: 74 IN | RESPIRATION RATE: 18 BRPM

## 2023-01-20 PROCEDURE — 25000003 PHARM REV CODE 250

## 2023-01-20 PROCEDURE — 25000003 PHARM REV CODE 250: Performed by: PSYCHIATRY & NEUROLOGY

## 2023-01-20 RX ADMIN — RISPERIDONE 0.5 MG: 0.25 TABLET ORAL at 09:01

## 2023-01-20 RX ADMIN — SERTRALINE HYDROCHLORIDE 100 MG: 50 TABLET ORAL at 09:01

## 2023-01-20 NOTE — NURSING
All forms signed per pt and witnessed per staff. All clothing and valuables returned to pt. Nursing Discharge Summary completed and reviewed with pt. He voiced understanding. Copy of Nursing Discharge Summary given to pt. 30 day supply with no refill phoned to [facility] in [place], [place]. He left [facility] in a stable mood with no complaints noted. He will be transported to his home in [place], [place] per [employer] Transport.

## 2023-01-20 NOTE — PLAN OF CARE
Problem: Activity and Energy Impairment (Excessive Substance Use)  Goal: Optimized Energy Level (Excessive Substance Use)  Outcome: Met     Problem: Behavior Regulation Impairment (Excessive Substance Use)  Goal: Improved Behavioral Control (Excessive Substance Use)  Outcome: Met     Problem: Decreased Participation and Engagement (Excessive Substance Use)  Goal: Increased Participation and Engagement (Excessive Substance Use)  Outcome: Met     Problem: Physiologic Impairment (Excessive Substance Use)  Goal: Improved Physiologic Symptoms (Excessive Substance Use)  Outcome: Met     Problem: Social, Occupational or Functional Impairment (Excessive Substance Use)  Goal: Enhanced Social, Occupational or Functional Skills (Excessive Substance Use)  Outcome: Met     Problem: Activity and Energy Impairment (Depressive Signs/Symptoms)  Goal: Optimized Energy Level (Depressive Signs/Symptoms)  Outcome: Met     Problem: Cognitive Impairment (Depressive Signs/Symptoms)  Goal: Optimized Cognitive Function  Outcome: Met     Problem: Decreased Participation/Engagement (Depressive Signs/Symptoms)  Goal: Increased Participation and Engagement (Depressive Signs/Symptoms)  Outcome: Met     Problem: Feelings of Worthlessness, Hopelessness or Excessive Guilt (Depressive Signs/Symptoms)  Goal: Enhanced Self-Esteem and Confidence (Depressive Signs/Symptoms)  Outcome: Met     Problem: Mood Impairment (Depressive Signs/Symptoms)  Goal: Improved Mood Symptoms (Depressive Signs/Symptoms)  Outcome: Met     Problem: Nutrition Imbalance (Depressive Signs/Symptoms)  Goal: Optimized Nutrition Intake  Outcome: Met     Problem: Psychomotor Impairment (Depressive Signs/Symptoms)  Goal: Improved Psychomotor Symptoms (Depressive Signs/Symptoms)  Outcome: Met     Problem: Sleep Disturbance (Depressive Signs/Symptoms)  Goal: Improved Sleep (Depressive Signs/Symptoms)  Outcome: Met     Problem: Social, Occupational or Functional Impairment (Depressive  Signs/Symptoms)  Goal: Enhanced Social, Occupational or Functional Skills (Depressive Signs/Symptoms)  Outcome: Met     Problem: Behavior Regulation Impairment (Psychotic Signs/Symptoms)  Goal: Improved Behavioral Control (Psychotic Signs/Symptoms)  Outcome: Met     Problem: Cognitive Impairment (Psychotic Signs/Symptoms)  Goal: Optimal Cognitive Function (Psychotic Signs/Symptoms)  Outcome: Met     Problem: Decreased Participation and Engagement (Psychotic Signs/Symptoms)  Goal: Increased Participation and Engagement (Psychotic Signs/Symptoms)  Outcome: Met     Problem: Mood Impairment (Psychotic Signs/Symptoms)  Goal: Improved Mood Symptoms (Psychotic Signs/Symptoms)  Outcome: Met     Problem: Psychomotor Impairment (Psychotic Signs/Symptoms)  Goal: Improved Psychomotor Symptoms (Psychotic Signs/Symptoms)  Outcome: Met     Problem: Sensory Perception Impairment (Psychotic Signs/Symptoms)  Goal: Decreased Sensory Symptoms (Psychotic Signs/Symptoms)  Outcome: Met     Problem: Sleep Disturbance (Psychotic Signs/Symptoms)  Goal: Improved Sleep (Psychotic Signs/Symptoms)  Outcome: Met     Problem: Social, Occupational or Functional Impairment (Psychotic Signs/Symptoms)  Goal: Enhanced Social, Occupational or Functional Skills (Psychotic Signs/Symptoms)  Outcome: Met     Problem: Violence Risk or Actual  Goal: Anger and Impulse Control  Outcome: Met

## 2023-01-26 NOTE — DISCHARGE SUMMARY
DISCHARGE SUMMARY  PSYCHIATRY      Admit Date: 1/15/2023 12:30 AM    Discharge Date:  1/20/2023    SITE:   [facility]    Discharge Attending Physician: Juan M Anand MD    History of Present Illness On Admit:   Kevin Dukes is a 52 y.o. male placed under a PEC at [facility] auditory and visual hallucinations. Patient endorses a lifelong struggle with these symptoms. Patient states that he was treated successfully with Risperdal during his last visit here but nothing else has seemed to help. Patient endorses trialing Invega Sustenna without success, however it is unclear if patient was compliant. Patient is calm and cooperative during my exam. He denies current AVH but endorses that they have been present since his arrival here. Patient endorses that they usually tell him to do things. He confirms depression but denies any desire to ham himself or others. Will admit for medication management and safety monitoring.    Diagnoses:  PRINCIPAL PROBLEM: Hallucinations    PROBLEM LIST    Psychosis    Depression      Discharged Condition: Stable    Hospital Course:   Patient was admitted to [facility] and started on Risperdal 0.5 mg BID and Zoloft 50 mg daily. Zoloft was increased to 100 mg during this patients stay. During this patient's stay, they admitted to visual and auditory hallucinations and delusions. During the program course, the patient was educated on the dynamic aspects of their disorder. The patient was minimally interactive with group activities and therapy but showed progress throughout the stay.  Individual and group psychotherapy sessions focused on disease process, symptom management, positive coping skills, healthy living skills, leisure skills, and chemical dependency issues.  Nursing groups focused on medications and the importance of both medication and treatment compliance.  At the time of discharge, there were no thoughts of self-harm or harming others.  "At the time of discharge no tremors, rigidity, extrapyramidal symptoms, or excessive sedation were noted.  The patient achieved the maximum benefit of inpatient hospitalization and was discharged home at this level of functioning. Patient was set up with [facility] for aftercare and continued medication management.     Disposition: discharged to home      DISCHARGE EXAMINATION    VITALS   Vitals:    01/18/23 1900 01/19/23 0700 01/19/23 1144 01/20/23 0701   BP: (!) 165/80 (!) 143/94 (!) 143/94 (!) 170/51   BP Location: Left arm   Right arm   Patient Position: Sitting   Sitting   Pulse: 82 70 70 67   Resp: 18 18 18 18   Temp: 97.7 °F (36.5 °C) 97.9 °F (36.6 °C) 97.5 °F (36.4 °C) 98.2 °F (36.8 °C)   TempSrc: Oral   Oral   SpO2: 99% 98%  97%   Weight:       Height:           Psychiatric Mental Status Exam:  General Appearance: appears stated age, well-developed, well-nourished  Arousal: alert  Behavior: cooperative  Movements and Motor Activity: no abnormal involuntary movements noted  Orientation: oriented to person, place, time, and situation  Speech: normal rate, normal rhythm, normal volume, normal tone  Mood: "Getting better"  Affect: Anxious, improving  Thought Process: linear  Associations: intact  Thought Content and Perceptions: Denies auditory and visual hallucinations, no suicidal ideation, no homicidal ideation  Recent and Remote Memory: recent memory intact, remote memory intact  Attention and Concentration: intact  Fund of Knowledge: intact  Insight: intact      Medication Regimen:  No current facility-administered medications for this encounter.    Current Outpatient Medications:     risperiDONE (RISPERDAL) 0.5 MG Tab, Take 1 tablet (0.5 mg total) by mouth 2 (two) times daily., Disp: 60 tablet, Rfl: 0    sertraline (ZOLOFT) 100 MG tablet, Take 1 tablet (100 mg total) by mouth once daily., Disp: 30 tablet, Rfl: 0      Patient Instructions:   Continue medication regimen as " prescribed.    Disposition plan per  - see  notes for details.    Patient instructed to call 911 or present to emergency department if any of the following complications develop status post discharge: suicidality, homicidality, or grave disability.     Total time spent discharging patient: <30 minutes      Gabino TOLLIVER-BC

## 2023-04-20 NOTE — ED PROVIDER NOTES
"Encounter Date: 9/17/2022       History     Chief Complaint   Patient presents with    Foot Pain     States "for some months".  Denies injury.     52-year-old gentleman presents emergency room complaints of bilateral foot pain.  Patient reports pain has been ongoing for the last few months without improvement.  Patient has a history of bunions, currently requesting surgical care due to pain.  Patient denies fever chills.  Denies nausea vomiting.  Denies any injury to the feet..    The history is provided by the patient.   Review of patient's allergies indicates:  No Known Allergies  History reviewed. No pertinent past medical history.  History reviewed. No pertinent surgical history.  History reviewed. No pertinent family history.  Social History     Tobacco Use    Smoking status: Former     Types: Cigarettes    Smokeless tobacco: Never   Substance Use Topics    Alcohol use: Yes    Drug use: Not Currently     Review of Systems   Constitutional:  Negative for chills, fatigue and fever.   HENT:  Negative for ear pain, rhinorrhea and sore throat.    Eyes:  Negative for photophobia and pain.   Respiratory:  Negative for cough, shortness of breath and wheezing.    Cardiovascular:  Negative for chest pain.   Gastrointestinal:  Negative for abdominal pain, diarrhea, nausea and vomiting.   Genitourinary:  Negative for dysuria.   Neurological:  Negative for dizziness, weakness and headaches.   All other systems reviewed and are negative.    Physical Exam     Initial Vitals [09/17/22 0534]   BP Pulse Resp Temp SpO2   (!) 157/97 77 18 98.2 °F (36.8 °C) 98 %      MAP       --         Physical Exam    Nursing note and vitals reviewed.  Constitutional: He appears well-developed and well-nourished.   HENT:   Head: Normocephalic and atraumatic.   Eyes: EOM are normal. Pupils are equal, round, and reactive to light.   Neck: Neck supple.   Normal range of motion.  Cardiovascular:  Normal rate, regular rhythm and normal heart sounds.  " ----- Message from Roxy Patiño sent at 4/20/2023 10:33 AM CDT -----  Pt's son called and would like to change the appt date to 04/24/2023 if possible. Call back number is [phone]. Thx .EL        Exam reveals no gallop and no friction rub.       No murmur heard.  Pulmonary/Chest: Breath sounds normal. No respiratory distress.   Abdominal: Abdomen is soft. Bowel sounds are normal. He exhibits no distension. There is no abdominal tenderness.   Musculoskeletal:         General: No edema. Normal range of motion.      Cervical back: Normal range of motion and neck supple.      Comments: Hallux valgus deformity bilateral feet.  No erythema.  No skin breakdown.     Neurological: He is alert and oriented to person, place, and time. He has normal strength.   Skin: Skin is warm and dry.   Psychiatric: He has a normal mood and affect. His behavior is normal. Judgment and thought content normal.       ED Course   Procedures  Labs Reviewed - No data to display       Imaging Results    None          Medications - No data to display  Medical Decision Making:   Initial Assessment:   Discussed with the patient regarding the foot discomfort - will refer to ortho for evaluation.  ER precautions for any acute worsening.  Patient offered a Toradol injection to help wit the pain, but patient does not desire.                        Clinical Impression:   Final diagnoses:  [M21.611, M21.612] Bilateral bunions (Primary)      ED Disposition Condition    Discharge Stable          ED Prescriptions       Medication Sig Dispense Start Date End Date Auth. Provider    indomethacin (INDOCIN) 50 MG capsule Take 1 capsule (50 mg total) by mouth 3 (three) times daily as needed (pain). 30 capsule 9/17/2022 9/27/2022 Issac Gleason MD          Follow-up Information       Follow up With Specialties Details Why Contact Info    [facility] Emergency Medicine  As needed, If symptoms worsen [address] [facility] [address] [phone]    [facility]   [address] [address] [phone]             Issac Gleason MD  09/17/22  3546

## 2023-07-25 ENCOUNTER — HOSPITAL ENCOUNTER (EMERGENCY)
Facility: HOSPITAL | Age: 53
Discharge: HOME | End: 2023-07-25
Attending: EMERGENCY MEDICINE
Payer: MEDICAID

## 2023-07-25 VITALS
TEMPERATURE: 99 F | SYSTOLIC BLOOD PRESSURE: 146 MMHG | DIASTOLIC BLOOD PRESSURE: 89 MMHG | RESPIRATION RATE: 17 BRPM | WEIGHT: 185 LBS | HEART RATE: 77 BPM | BODY MASS INDEX: 23.74 KG/M2 | OXYGEN SATURATION: 97 % | HEIGHT: 74 IN

## 2023-07-25 DIAGNOSIS — J01.90 ACUTE SINUSITIS, RECURRENCE NOT SPECIFIED, UNSPECIFIED LOCATION: Primary | ICD-10-CM

## 2023-07-25 DIAGNOSIS — J20.9 ACUTE PURULENT BRONCHITIS: ICD-10-CM

## 2023-07-25 LAB
FLUAV AG UPPER RESP QL IA.RAPID: NOT DETECTED
FLUBV AG UPPER RESP QL IA.RAPID: NOT DETECTED
SARS-COV-2 RNA RESP QL NAA+PROBE: NOT DETECTED

## 2023-07-25 PROCEDURE — 0240U COVID/FLU A&B PCR: CPT | Performed by: EMERGENCY MEDICINE

## 2023-07-25 PROCEDURE — 99284 EMERGENCY DEPT VISIT MOD MDM: CPT

## 2023-07-25 RX ORDER — FLUTICASONE PROPIONATE 50 MCG
1 SPRAY, SUSPENSION (ML) NASAL 2 TIMES DAILY
Qty: 15.8 ML | Refills: 0 | Status: SHIPPED | OUTPATIENT
Start: 2023-07-25 | End: 2023-08-08

## 2023-07-25 RX ORDER — BENZONATATE 100 MG/1
100 CAPSULE ORAL 3 TIMES DAILY PRN
Qty: 20 CAPSULE | Refills: 0 | Status: SHIPPED | OUTPATIENT
Start: 2023-07-25 | End: 2023-08-01

## 2023-07-25 RX ORDER — AMOXICILLIN AND CLAVULANATE POTASSIUM 875; 125 MG/1; MG/1
1 TABLET, FILM COATED ORAL 2 TIMES DAILY
Qty: 14 TABLET | Refills: 0 | Status: SHIPPED | OUTPATIENT
Start: 2023-07-25 | End: 2023-08-01

## 2023-07-25 NOTE — ED PROVIDER NOTES
Encounter Date: 7/25/2023       History     Chief Complaint   Patient presents with    Sinus Problem     Sinus pressure and congestion x5 days, denies fever, cough, abd pain, n/v, sob, and CP     53-year-old male history of schizophrenia bipolar disorder presents with sinus congestion cough productive of green mucus x5 days.  He states he was running fever early on but has not the last few days.  Denies any chest pain or shortness of breath.  Abdominal pain nausea vomiting diarrhea.      Review of patient's allergies indicates:  No Known Allergies  Past Medical History:   Diagnosis Date    Bipolar disorder, unspecified     Schizophrenia, unspecified      No past surgical history on file.  No family history on file.  Social History     Tobacco Use    Smoking status: Former     Types: Cigarettes    Smokeless tobacco: Never   Substance Use Topics    Alcohol use: Not Currently    Drug use: Not Currently     Review of Systems   Constitutional:  Negative for chills and fever.   HENT:  Positive for congestion and sore throat. Negative for trouble swallowing.    Respiratory:  Positive for cough. Negative for chest tightness and shortness of breath.    Cardiovascular:  Negative for chest pain.   Gastrointestinal:  Negative for abdominal pain, nausea and vomiting.   Musculoskeletal:  Negative for myalgias and neck pain.   Neurological:  Negative for syncope.   All other systems reviewed and are negative.    Physical Exam     Initial Vitals [07/25/23 0138]   BP Pulse Resp Temp SpO2   (!) 146/89 77 17 98.5 °F (36.9 °C) 97 %      MAP       --         Physical Exam    Nursing note and vitals reviewed.  Constitutional: He appears well-developed and well-nourished. No distress.   HENT:   Head: Normocephalic and atraumatic.   Eyes: Conjunctivae are normal.   No oropharyngeal erythema turbinates are enlarged.   Cardiovascular:  Normal rate.           Pulmonary/Chest: No respiratory distress. He has no wheezes. He has no rhonchi.    Abdominal: Abdomen is soft. There is no abdominal tenderness. There is no rebound and no guarding.   Musculoskeletal:         General: Normal range of motion.     Neurological: He is alert and oriented to person, place, and time. He has normal strength.   Skin: Skin is warm and dry.   Psychiatric: He has a normal mood and affect.       ED Course   Procedures  Labs Reviewed   COVID/FLU A&B PCR - Normal    Narrative:     The Xpert Xpress SARS-CoV-2/FLU/RSV plus is a rapid, multiplexed real-time PCR test intended for the simultaneous qualitative detection and differentiation of SARS-CoV-2, Influenza A, Influenza B, and respiratory syncytial virus (RSV) viral RNA in either nasopharyngeal swab or nasal swab specimens.                Imaging Results    None          Medications - No data to display                    Does have a psychiatric history noted in the chart but he is calm cooperative no evidence of any delusions or hallucinations at this time.  Symptoms are consistent with sinusitis and acute bronchitis.  Lungs are clear on my exam.  Satting well on room air.  Will treat with Augmentin for purulent bronchitis which will also treat the sinus infection.  Also recommend Flonase and cough medication.  Negative for COVID and influenza.  No indication for chest x-ray at this time.  Patient is comfortable with treatment plan       Clinical Impression:   Final diagnoses:  [J01.90] Acute sinusitis, recurrence not specified, unspecified location (Primary)  [J20.9] Acute purulent bronchitis        ED Disposition Condition    Discharge Stable          ED Prescriptions       Medication Sig Dispense Start Date End Date Auth. Provider    amoxicillin-clavulanate 875-125mg (AUGMENTIN) 875-125 mg per tablet Take 1 tablet by mouth 2 (two) times daily. for 7 days 14 tablet 7/25/2023 8/1/2023 Jacky Baez MD    benzonatate (TESSALON) 100 MG capsule Take 1 capsule (100 mg total) by mouth 3 (three) times daily as needed for  Cough. 20 capsule 7/25/2023 8/1/2023 Jacky Baez MD    fluticasone propionate (FLONASE) 50 mcg/actuation nasal spray 1 spray (50 mcg total) by Each Nostril route 2 (two) times a day. for 14 days 15.8 mL 7/25/2023 8/8/2023 Jacky Baez MD          Follow-up Information       Follow up With Specialties Details Why Contact Info    Primary care provider   You can call [phone] to get set up with a local primary care provider within the next few days.If your symptoms worsen or change please return to the emergency department for re-evaluation Call your primary care provider to schedule a follow-up appointment within a week             Jacky Baez MD  07/25/23 2144

## 2023-10-05 ENCOUNTER — HOSPITAL ENCOUNTER (EMERGENCY)
Facility: HOSPITAL | Age: 53
Discharge: HOME | End: 2023-10-05
Attending: EMERGENCY MEDICINE
Payer: MEDICAID

## 2023-10-05 ENCOUNTER — PATIENT OUTREACH (OUTPATIENT)
Dept: EMERGENCY MEDICINE | Facility: HOSPITAL | Age: 53
End: 2023-10-05
Payer: MEDICAID

## 2023-10-05 VITALS
BODY MASS INDEX: 23.1 KG/M2 | HEART RATE: 70 BPM | WEIGHT: 180 LBS | HEIGHT: 74 IN | OXYGEN SATURATION: 97 % | TEMPERATURE: 98 F | DIASTOLIC BLOOD PRESSURE: 96 MMHG | RESPIRATION RATE: 16 BRPM | SYSTOLIC BLOOD PRESSURE: 174 MMHG

## 2023-10-05 DIAGNOSIS — S63.502A SPRAIN OF LEFT WRIST, INITIAL ENCOUNTER: ICD-10-CM
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
## 2023-10-05 DIAGNOSIS — S20.212A CONTUSION OF LEFT FRONT WALL OF THORAX, INITIAL ENCOUNTER: Primary | ICD-10-CM

## 2023-10-05 DIAGNOSIS — S40.012A CONTUSION OF LEFT SHOULDER, INITIAL ENCOUNTER: ICD-10-CM

## 2023-10-05 DIAGNOSIS — T07.XXXA ABRASIONS OF MULTIPLE SITES: ICD-10-CM

## 2023-10-05 PROCEDURE — 99283 EMERGENCY DEPT VISIT LOW MDM: CPT | Mod: 25

## 2023-10-05 RX ORDER — HYDROCODONE BITARTRATE AND ACETAMINOPHEN 5; 325 MG/1; MG/1
1 TABLET ORAL EVERY 6 HOURS PRN
Qty: 15 TABLET | Refills: 0 | Status: ON HOLD | OUTPATIENT
Start: 2023-10-05 | End: 2024-02-05 | Stop reason: HOSPADM

## 2023-10-05 RX ORDER — TRAMADOL HYDROCHLORIDE 50 MG/1
50 TABLET ORAL EVERY 6 HOURS PRN
Qty: 15 TABLET | Refills: 0 | Status: SHIPPED | OUTPATIENT
Start: 2023-10-05 | End: 2023-10-05 | Stop reason: ALTCHOICE
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
## 2023-10-05 NOTE — ED PROVIDER NOTES
Encounter Date: 10/5/2023    SCRIBE #1 NOTE: I, Priscilla Cheneyangela, am scribing for, and in the presence of,  Mason Arita MD. I have scribed the entire note.     History     Chief Complaint   Patient presents with    Fall     States he fell off of a motorized scooter 1 wk ago and wasn't evaluated at time of fall. (-) LOC, did not hit head. C/O L shoulder, L wrist, and L rib pain. Ambulatory, GCS 15.      53 year old male with a hx of bipolar disorder and schizophrenia presents to the ED following a fall. Pt states he fell off of his motorized scooter 6 days ago. Pt was not evaluated at the time of the fall. Pt landed on his left side. Pt complains of multiple abrasions to his extremities and torso. Pt lastly says he is experiencing left shoulder, wrist, and rib pain.     The history is provided by the patient. No  was used.     Review of patient's allergies indicates:  No Known Allergies  Past Medical History:   Diagnosis Date    Bipolar disorder, unspecified     Schizophrenia, unspecified      No past surgical history on file.  No family history on file.  Social History     Tobacco Use    Smoking status: Former     Types: Cigarettes    Smokeless tobacco: Never   Substance Use Topics    Alcohol use: Not Currently    Drug use: Not Currently     Review of Systems   Constitutional:  Negative for activity change, chills, diaphoresis, fatigue and fever.   HENT:  Negative for congestion, ear pain, sinus pain and sore throat.    Eyes:  Negative for visual disturbance.   Respiratory:  Negative for cough, shortness of breath, wheezing and stridor.    Cardiovascular:  Negative for chest pain, palpitations and leg swelling.   Gastrointestinal:  Negative for abdominal pain, constipation, diarrhea, nausea, rectal pain and vomiting.   Genitourinary:  Negative for dysuria and hematuria.   Musculoskeletal:  Negative for arthralgias, back pain and myalgias.        Left shoulder, rib, and wrist pain.     Skin:  Negative for rash.   Neurological:  Negative for dizziness, syncope, weakness, numbness and headaches.   All other systems reviewed and are negative.      Physical Exam     Initial Vitals [10/05/23 0144]   BP Pulse Resp Temp SpO2   (!) 174/96 70 16 97.7 °F (36.5 °C) 97 %      MAP       --         Physical Exam    Nursing note and vitals reviewed.  Constitutional: No distress.   HENT:   Head: Normocephalic and atraumatic.   Eyes: EOM are normal.   Neck: Trachea normal. Neck supple.   Normal range of motion.  Cardiovascular:  Normal rate and regular rhythm.           No murmur heard.  Pulmonary/Chest: Breath sounds normal. No respiratory distress.   Abdominal: Abdomen is soft. Bowel sounds are normal. He exhibits no distension. There is no abdominal tenderness. There is no rebound and no guarding.   Musculoskeletal:         General: Normal range of motion.      Cervical back: Normal range of motion and neck supple.      Lumbar back: Normal.      Comments: Tenderness to the anterior left shoulder. ROM intact without without any pain. Swelling to left wrist with overlying abrasion. Left lower rib tenderness.      Neurological: He is alert and oriented to person, place, and time. He has normal strength.   Skin: Skin is warm and dry. No rash noted.   Psychiatric: He has a normal mood and affect.       ED Course   Procedures  Labs Reviewed - No data to display       Imaging Results              X-Ray Chest PA And Lateral (In process)                      X-Ray Wrist Complete Left (In process)                      X-Ray Shoulder Trauma Left (In process)                      Medications - No data to display  Medical Decision Making  Amount and/or Complexity of Data Reviewed  Radiology: ordered.            Scribe Attestation:   Scribe #1: I performed the above scribed service and the documentation accurately describes the services I performed. I attest to the accuracy of the note.    Attending Attestation:            Physician Attestation for Scribe:  Physician Attestation Statement for Scribe #1: I, Mason Arita MD, reviewed documentation, as scribed by Priscilla Martines in my presence, and it is both accurate and complete.                           Clinical Impression:   Final diagnoses:  [W19.XXXA] Fall

## 2023-10-06 ENCOUNTER — PATIENT OUTREACH (OUTPATIENT)
Dept: EMERGENCY MEDICINE | Facility: HOSPITAL | Age: 53
End: 2023-10-06
Payer: MEDICAID

## 2023-10-06 NOTE — PROGRESS NOTES
Brother states this is not the patient's number, [phone], and he does not know where the patient is and does not have his number.    Invalid phone number.  Encounter closed.

## 2023-10-15 ENCOUNTER — HOSPITAL ENCOUNTER (EMERGENCY)
Facility: HOSPITAL | Age: 53
Discharge: HOME | End: 2023-10-15
Attending: EMERGENCY MEDICINE
Payer: MEDICAID

## 2023-10-15 VITALS
SYSTOLIC BLOOD PRESSURE: 144 MMHG | WEIGHT: 185 LBS | HEIGHT: 75 IN | DIASTOLIC BLOOD PRESSURE: 93 MMHG | BODY MASS INDEX: 23 KG/M2 | TEMPERATURE: 98 F | RESPIRATION RATE: 18 BRPM | HEART RATE: 75 BPM | OXYGEN SATURATION: 97 %

## 2023-10-15 DIAGNOSIS — L84 CALLUS OF FOOT: Primary | ICD-10-CM

## 2023-10-15 PROCEDURE — 25000003 PHARM REV CODE 250: Performed by: NURSE PRACTITIONER

## 2023-10-15 PROCEDURE — 99283 EMERGENCY DEPT VISIT LOW MDM: CPT

## 2023-10-15 RX ORDER — MUPIROCIN 20 MG/G
OINTMENT TOPICAL 3 TIMES DAILY
Qty: 22 G | Refills: 0 | Status: SHIPPED | OUTPATIENT
Start: 2023-10-15 | End: 2023-10-22

## 2023-10-15 RX ADMIN — IBUPROFEN 800 MG: 200 TABLET, FILM COATED ORAL at 09:10

## 2023-10-16 NOTE — ED PROVIDER NOTES
Encounter Date: 10/15/2023       History     Chief Complaint   Patient presents with    Toe Pain     Pt c/o 2nd digit to L foot for a few months, denied any trauma to area or seeing a provider about issue. Callous noted to toe, no obvious sign on infection. Pt ambulated w/ steady gate into triage.     See MDM    The history is provided by the patient. No  was used.     Review of patient's allergies indicates:  No Known Allergies  Past Medical History:   Diagnosis Date    Bipolar disorder, unspecified     Schizophrenia, unspecified      History reviewed. No pertinent surgical history.  History reviewed. No pertinent family history.  Social History     Tobacco Use    Smoking status: Former     Types: Cigarettes    Smokeless tobacco: Never   Substance Use Topics    Alcohol use: Not Currently    Drug use: Not Currently     Review of Systems   Constitutional:  Negative for fever.   Respiratory:  Negative for cough and shortness of breath.    Cardiovascular:  Negative for chest pain.   Gastrointestinal:  Negative for abdominal pain.   Genitourinary:  Negative for difficulty urinating and dysuria.   Musculoskeletal:  Negative for gait problem.   Skin:  Negative for color change.   Neurological:  Negative for dizziness, speech difficulty and headaches.   Psychiatric/Behavioral:  Negative for hallucinations and suicidal ideas.    All other systems reviewed and are negative.      Physical Exam     Initial Vitals [10/15/23 2051]   BP Pulse Resp Temp SpO2   (!) 144/93 75 18 98.1 °F (36.7 °C) 97 %      MAP       --         Physical Exam    Nursing note and vitals reviewed.  Constitutional: He appears well-developed and well-nourished.   HENT:   Head: Normocephalic.   Eyes: EOM are normal.   Neck: Neck supple.   Normal range of motion.  Cardiovascular:  Normal rate, regular rhythm, normal heart sounds and intact distal pulses.           Pulmonary/Chest: Breath sounds normal.   Abdominal: Abdomen is soft. Bowel  sounds are normal.   Musculoskeletal:         General: Normal range of motion.      Cervical back: Normal range of motion and neck supple.     Neurological: He is alert and oriented to person, place, and time. He has normal strength.   Skin: Skin is warm and dry. Capillary refill takes less than 2 seconds.   Callus to left 2nd toe   Psychiatric: He has a normal mood and affect. His behavior is normal. Judgment and thought content normal.         ED Course   Procedures  Labs Reviewed - No data to display       Imaging Results    None          Medications   ibuprofen tablet 800 mg (800 mg Oral Given 10/15/23 2116)     Medical Decision Making  Historian:  Patient.  Patient is a 53-year-old male  that presents with callus to left 2nd toe that has been present 1-2 weeks. Associated symptoms nothing. Surrounding information is nothing. Exacerbated by nothing. Relieved by nothing. Patient treatment prior to arrival none. Risk factors include none. Other history pertaining to this complaint nothing.   Assessment:  See physical exam.  DD:  Callus of foot      Amount and/or Complexity of Data Reviewed  Discussion of management or test interpretation with external provider(s): History was obtained.  Physical performed.  I recommended he follow up with his PCP for Podiatry referral.  No medical or surgical consult indicated in ER.  No social determine its effect healthcare noted.                               Clinical Impression:   Final diagnoses:  [L84] Callus of foot - left second toe (Primary)        ED Disposition Condition    Discharge Stable          ED Prescriptions       Medication Sig Dispense Start Date End Date Auth. Provider    mupirocin (BACTROBAN) 2 % ointment Apply topically 3 (three) times daily. for 7 days 22 g 10/15/2023 10/22/2023 Bimal Castro FNP          Follow-up Information       Follow up With Specialties Details Why Contact Info    Your Primary Care Provider  Call in 3 days ed follow up               Bimal Castro, [facility]  10/15/23 2125

## 2023-10-16 NOTE — FIRST PROVIDER EVALUATION
Medical screening examination initiated.  I have conducted a focused provider triage encounter, findings are as follows:    Brief history of present illness:  54 y/o male who presents with left 2nd toe pain for a while now which makes it challenging fo rhim to walk he states. No new injury/trauma. No meds taken    There were no vitals filed for this visit.    Pertinent physical exam:  alert, left 2nd toe has callus on it and appears mildly swollen in comparison. +ambulatory     Brief workup plan:  exam    Preliminary workup initiated; this workup will be continued and followed by the physician or advanced practice provider that is assigned to the patient when roomed.

## 2024-01-15 ENCOUNTER — HOSPITAL ENCOUNTER (EMERGENCY)
Facility: HOSPITAL | Age: 54
Discharge: HOME | End: 2024-01-15
Attending: EMERGENCY MEDICINE
Payer: MEDICAID

## 2024-01-15 VITALS
RESPIRATION RATE: 16 BRPM | DIASTOLIC BLOOD PRESSURE: 69 MMHG | TEMPERATURE: 98 F | HEART RATE: 76 BPM | SYSTOLIC BLOOD PRESSURE: 126 MMHG | OXYGEN SATURATION: 97 %

## 2024-01-15 DIAGNOSIS — R05.9 COUGH: Primary | ICD-10-CM

## 2024-01-15 LAB
INFLUENZA A, MOLECULAR: NEGATIVE
INFLUENZA B, MOLECULAR: NEGATIVE
SARS-COV-2 RDRP RESP QL NAA+PROBE: NEGATIVE
SPECIMEN SOURCE: NORMAL

## 2024-01-15 PROCEDURE — 87502 INFLUENZA DNA AMP PROBE: CPT | Performed by: EMERGENCY MEDICINE

## 2024-01-15 PROCEDURE — 99283 EMERGENCY DEPT VISIT LOW MDM: CPT | Mod: 25

## 2024-01-15 PROCEDURE — U0002 COVID-19 LAB TEST NON-CDC: HCPCS | Performed by: EMERGENCY MEDICINE

## 2024-01-15 PROCEDURE — 25000003 PHARM REV CODE 250: Performed by: EMERGENCY MEDICINE

## 2024-01-15 RX ORDER — AZITHROMYCIN 250 MG/1
250 TABLET, FILM COATED ORAL DAILY
Qty: 6 TABLET | Refills: 0 | Status: ON HOLD | OUTPATIENT
Start: 2024-01-15 | End: 2024-02-05 | Stop reason: HOSPADM

## 2024-01-15 RX ORDER — IBUPROFEN 600 MG/1
600 TABLET ORAL
Status: COMPLETED | OUTPATIENT
Start: 2024-01-15 | End: 2024-01-15

## 2024-01-15 RX ADMIN — IBUPROFEN 600 MG: 600 TABLET, FILM COATED ORAL at 05:01

## 2024-01-15 NOTE — ED NOTES
Patient identifiers for Kevin Dukes checked and correct.    LOC: The patient is awake, alert and aware of environment with an appropriate affect, the patient is oriented x 4 and speaking appropriately.    APPEARANCE: Patient resting comfortably and in no acute distress, patient is clean and well groomed, patient's clothing is properly fastened.    SKIN: The skin is warm and dry, color consistent with ethnicity, patient has normal skin turgor and moist mucus membranes, skin intact, no breakdown or bruising noted.    MUSCULOSKELETAL: Patient moving all extremities well, no obvious swelling or deformities noted. +Generalized weakness/fatigue    RESPIRATORY: Airway is open and patent, respirations are spontaneous and even, patient has a normal effort and rate. Denies SOB. +cough with green sputum    CARDIAC: Patient has a normal rate and rhythm, no periphreal edema noted, capillary refill < 3 seconds.    ABDOMEN: Soft and non tender to palpation, no distention noted. Patient denies any nausea, vomiting, diarrhea, or constipation.     NEUROLOGIC: Eyes open spontaneously, PERRL, behavior appropriate to situation, follows commands, facial expression symmetrical, bilateral hand grasp equal and even, purposeful motor response noted, normal sensation in all extremities.     HEENT: No abnormalities noted. White sclera and pupils equal round and reactive to light. Denies dizziness. +HA    : Pt voids independently, denies dysuria, hematuria, frequency.

## 2024-01-15 NOTE — ED PROVIDER NOTES
Source of History:  Patient  Chart    Chief complaint:  Flu Like Symptoms (HA, coughing green sputum, chills, body aches x 3 days. )      HPI:  Kevin Dukes is a 53 y.o. male with history of depression, migraines, former smoker, presenting to emergency department with complaint of cough.    Patient states he is had 3 days of a cough that is productive of green sputum.  He complains of fatigue, chills, body aches, headache.  No difficulty breathing.  No vomiting or diarrhea.  No abdominal pain.  No known sick contacts.     Review of patient's allergies indicates:  No Known Allergies    No current facility-administered medications on file prior to encounter.     Current Outpatient Medications on File Prior to Encounter   Medication Sig Dispense Refill    HYDROcodone-acetaminophen (NORCO) 5-325 mg per tablet Take 1 tablet by mouth every 6 (six) hours as needed for Pain. 15 tablet 0    risperiDONE (RISPERDAL) 0.5 MG Tab Take 1 tablet (0.5 mg total) by mouth 2 (two) times daily. 60 tablet 0    sertraline (ZOLOFT) 100 MG tablet Take 1 tablet (100 mg total) by mouth once daily. 30 tablet 0       PMH:  As per HPI and below:  Past Medical History:   Diagnosis Date    Bipolar disorder, unspecified     Schizophrenia, unspecified      No past surgical history on file.    Social History     Socioeconomic History    Marital status: Single   Tobacco Use    Smoking status: Former     Types: Cigarettes    Smokeless tobacco: Never   Substance and Sexual Activity    Alcohol use: Not Currently    Drug use: Not Currently   Social History Narrative    ** Merged History Encounter **            No family history on file.    Physical Exam:      Vitals:    01/15/24 0540   BP: 126/69   Pulse: 76   Resp: 16   Temp: 98.2 °F (36.8 °C)     Gen: No acute distress.  Nontoxic.  Well appearing.  Malodorous.  Mental Status:  Alert and oriented .  Appropriate, conversant.  Skin: Warm, dry. No rashes seen.  Eyes: No conjunctival  injection.  Pulm: CTAB. No increased work of breathing.  No significant tachypnea.  No audible stridor or wheezing.  No conversational dyspnea.    CV: Regular rate. Regular rhythm.   Abd: Soft.  Not distended.  Nontender.   MSK: Good range of motion all joints.  No deformities.    Neuro: Awake. Speech normal. No focal neuro deficit observed.      Laboratory Studies:  Labs Reviewed   INFLUENZA A & B BY MOLECULAR   SARS-COV-2 RNA AMPLIFICATION, QUAL     X-rays (independently interpreted by me):  No acute abnormality    Chart reviewed.     Imaging Results              X-Ray Chest PA And Lateral (Final result)  Result time 01/15/24 05:54:53      Final result by Derick Mayers MD (01/15/24 05:54:53)                   Impression:      No acute cardiopulmonary process.      Electronically signed by: Derick Mayers MD  Date:    01/15/2024  Time:    05:54               Narrative:    EXAMINATION:  XR CHEST PA AND LATERAL    CLINICAL HISTORY:  Cough, unspecified    TECHNIQUE:  PA and lateral views of the chest were performed.    COMPARISON:  10/05/2023.    FINDINGS:  There is no consolidation, effusion, or pneumothorax.    Cardiomediastinal silhouette is unremarkable.    Regional osseous structures are unremarkable.                                      Medications Given:  Medications   ibuprofen tablet 600 mg (600 mg Oral Given 1/15/24 0518)       MDM:    53 y.o. male with history of tobacco abuse presenting to emergency department with complaint of cough productive of green sputum.  He was afebrile, hemodynamically stable, nontoxic, well hydrated, well appearing.  No hypoxia or increased work of breathing.  Clear lungs on exam.    I obtained a chest x-ray - unremarkable.      COVID and flu negative.  Will discharge home with antibiotics for presumed bronchitis.  Discharged home in stable condition.    Diagnostic Impression:    1. Cough         ED Disposition Condition    Discharge Stable          ED Prescriptions        Medication Sig Dispense Start Date End Date Auth. Provider    azithromycin (Z-ALFREDO) 250 MG tablet Take 1 tablet (250 mg total) by mouth once daily. Take first 2 tablets together, then 1 every day until finished. 6 tablet 1/15/2024 -- Charmaine Jang MD          Follow-up Information       Follow up With Specialties Details Why Contact Info    Your primary care doctor  Schedule an appointment as soon as possible for a visit               Patient understands the plan and is in agreement, verbalized understanding, questions answered    Charmaine Jang MD  Emergency Medicine         Charmaine Jang MD  01/18/24 7514

## 2024-01-31 ENCOUNTER — HOSPITAL ENCOUNTER (EMERGENCY)
Facility: HOSPITAL | Age: 54
Discharge: PSYCHIATRIC OR SUBSTANCE USE FACILITY | End: 2024-01-31
Attending: STUDENT IN AN ORGANIZED HEALTH CARE EDUCATION/TRAINING PROGRAM
Payer: MEDICAID

## 2024-01-31 VITALS
HEIGHT: 75 IN | OXYGEN SATURATION: 98 % | DIASTOLIC BLOOD PRESSURE: 76 MMHG | TEMPERATURE: 98 F | RESPIRATION RATE: 16 BRPM | SYSTOLIC BLOOD PRESSURE: 128 MMHG | WEIGHT: 170 LBS | HEART RATE: 67 BPM | BODY MASS INDEX: 21.14 KG/M2

## 2024-01-31 DIAGNOSIS — R45.850 HOMICIDAL IDEATION: Primary | ICD-10-CM

## 2024-01-31 DIAGNOSIS — F29 PSYCHOSIS, UNSPECIFIED PSYCHOSIS TYPE: ICD-10-CM

## 2024-01-31 LAB
ALBUMIN SERPL BCP-MCNC: 4 G/DL (ref 3.5–5.2)
ALP SERPL-CCNC: 71 U/L (ref 55–135)
ALT SERPL W/O P-5'-P-CCNC: 18 U/L (ref 10–44)
AMPHET+METHAMPHET UR QL: NEGATIVE
ANION GAP SERPL CALC-SCNC: 10 MMOL/L (ref 8–16)
APAP SERPL-MCNC: <3 UG/ML (ref 10–20)
AST SERPL-CCNC: 32 U/L (ref 10–40)
BARBITURATES UR QL SCN>200 NG/ML: NEGATIVE
BASOPHILS # BLD AUTO: 0.07 K/UL (ref 0–0.2)
BASOPHILS NFR BLD: 1 % (ref 0–1.9)
BENZODIAZ UR QL SCN>200 NG/ML: NEGATIVE
BILIRUB SERPL-MCNC: 0.4 MG/DL (ref 0.1–1)
BILIRUB UR QL STRIP: NEGATIVE
BUN SERPL-MCNC: 24 MG/DL (ref 6–20)
BZE UR QL SCN: ABNORMAL
CALCIUM SERPL-MCNC: 9.9 MG/DL (ref 8.7–10.5)
CANNABINOIDS UR QL SCN: NEGATIVE
CHLORIDE SERPL-SCNC: 104 MMOL/L (ref 95–110)
CLARITY UR REFRACT.AUTO: CLEAR
CO2 SERPL-SCNC: 26 MMOL/L (ref 23–29)
COLOR UR AUTO: YELLOW
CREAT SERPL-MCNC: 1.4 MG/DL (ref 0.5–1.4)
CREAT UR-MCNC: 242 MG/DL (ref 23–375)
DIFFERENTIAL METHOD BLD: ABNORMAL
EOSINOPHIL # BLD AUTO: 0.2 K/UL (ref 0–0.5)
EOSINOPHIL NFR BLD: 3 % (ref 0–8)
ERYTHROCYTE [DISTWIDTH] IN BLOOD BY AUTOMATED COUNT: 14.7 % (ref 11.5–14.5)
EST. GFR  (NO RACE VARIABLE): >60 ML/MIN/1.73 M^2
ETHANOL SERPL-MCNC: <10 MG/DL
GLUCOSE SERPL-MCNC: 61 MG/DL (ref 70–110)
GLUCOSE UR QL STRIP: NEGATIVE
HCT VFR BLD AUTO: 43.8 % (ref 40–54)
HCV AB SERPL QL IA: NORMAL
HGB BLD-MCNC: 13.9 G/DL (ref 14–18)
HGB UR QL STRIP: NEGATIVE
HIV 1+2 AB+HIV1 P24 AG SERPL QL IA: NORMAL
IMM GRANULOCYTES # BLD AUTO: 0.02 K/UL (ref 0–0.04)
IMM GRANULOCYTES NFR BLD AUTO: 0.3 % (ref 0–0.5)
KETONES UR QL STRIP: NEGATIVE
LEUKOCYTE ESTERASE UR QL STRIP: NEGATIVE
LITHIUM SERPL-SCNC: 0.1 MMOL/L (ref 0.6–1.2)
LYMPHOCYTES # BLD AUTO: 1.9 K/UL (ref 1–4.8)
LYMPHOCYTES NFR BLD: 27 % (ref 18–48)
MCH RBC QN AUTO: 30.2 PG (ref 27–31)
MCHC RBC AUTO-ENTMCNC: 31.7 G/DL (ref 32–36)
MCV RBC AUTO: 95 FL (ref 82–98)
METHADONE UR QL SCN>300 NG/ML: NEGATIVE
MONOCYTES # BLD AUTO: 1 K/UL (ref 0.3–1)
MONOCYTES NFR BLD: 14.4 % (ref 4–15)
NEUTROPHILS # BLD AUTO: 3.7 K/UL (ref 1.8–7.7)
NEUTROPHILS NFR BLD: 54.3 % (ref 38–73)
NITRITE UR QL STRIP: NEGATIVE
NRBC BLD-RTO: 0 /100 WBC
OPIATES UR QL SCN: NEGATIVE
PCP UR QL SCN>25 NG/ML: NEGATIVE
PH UR STRIP: 6 [PH] (ref 5–8)
PLATELET # BLD AUTO: 261 K/UL (ref 150–450)
PMV BLD AUTO: 12.1 FL (ref 9.2–12.9)
POTASSIUM SERPL-SCNC: 4.3 MMOL/L (ref 3.5–5.1)
PROT SERPL-MCNC: 8.1 G/DL (ref 6–8.4)
PROT UR QL STRIP: ABNORMAL
RBC # BLD AUTO: 4.6 M/UL (ref 4.6–6.2)
SALICYLATES SERPL-MCNC: <5 MG/DL (ref 15–30)
SODIUM SERPL-SCNC: 140 MMOL/L (ref 136–145)
SP GR UR STRIP: 1.03 (ref 1–1.03)
TOXICOLOGY INFORMATION: ABNORMAL
TSH SERPL DL<=0.005 MIU/L-ACNC: 1.66 UIU/ML (ref 0.4–4)
URN SPEC COLLECT METH UR: ABNORMAL
WBC # BLD AUTO: 6.89 K/UL (ref 3.9–12.7)

## 2024-01-31 PROCEDURE — 99285 EMERGENCY DEPT VISIT HI MDM: CPT

## 2024-01-31 PROCEDURE — 82077 ASSAY SPEC XCP UR&BREATH IA: CPT | Performed by: STUDENT IN AN ORGANIZED HEALTH CARE EDUCATION/TRAINING PROGRAM

## 2024-01-31 PROCEDURE — 80179 DRUG ASSAY SALICYLATE: CPT | Performed by: STUDENT IN AN ORGANIZED HEALTH CARE EDUCATION/TRAINING PROGRAM

## 2024-01-31 PROCEDURE — 81003 URINALYSIS AUTO W/O SCOPE: CPT | Mod: 59 | Performed by: STUDENT IN AN ORGANIZED HEALTH CARE EDUCATION/TRAINING PROGRAM

## 2024-01-31 PROCEDURE — 87389 HIV-1 AG W/HIV-1&-2 AB AG IA: CPT | Performed by: PHYSICIAN ASSISTANT

## 2024-01-31 PROCEDURE — 80178 ASSAY OF LITHIUM: CPT | Performed by: STUDENT IN AN ORGANIZED HEALTH CARE EDUCATION/TRAINING PROGRAM

## 2024-01-31 PROCEDURE — 84443 ASSAY THYROID STIM HORMONE: CPT | Performed by: STUDENT IN AN ORGANIZED HEALTH CARE EDUCATION/TRAINING PROGRAM

## 2024-01-31 PROCEDURE — 80307 DRUG TEST PRSMV CHEM ANLYZR: CPT | Performed by: STUDENT IN AN ORGANIZED HEALTH CARE EDUCATION/TRAINING PROGRAM

## 2024-01-31 PROCEDURE — 85025 COMPLETE CBC W/AUTO DIFF WBC: CPT | Performed by: STUDENT IN AN ORGANIZED HEALTH CARE EDUCATION/TRAINING PROGRAM

## 2024-01-31 PROCEDURE — 80053 COMPREHEN METABOLIC PANEL: CPT | Performed by: STUDENT IN AN ORGANIZED HEALTH CARE EDUCATION/TRAINING PROGRAM

## 2024-01-31 PROCEDURE — 80143 DRUG ASSAY ACETAMINOPHEN: CPT | Performed by: STUDENT IN AN ORGANIZED HEALTH CARE EDUCATION/TRAINING PROGRAM

## 2024-01-31 PROCEDURE — 86803 HEPATITIS C AB TEST: CPT | Performed by: PHYSICIAN ASSISTANT

## 2024-01-31 NOTE — ED NOTES
Pt escorted off of the unit on a stretcher by ED and security staff. Pt's PEC, transfer form, and all belongings given to [facility] staff. Pt remained calm and cooperative for the transfer.

## 2024-01-31 NOTE — ED NOTES
Report called to Lauro RODRIGUEZ at [facility]. Pt is aware of his pending transfer.

## 2024-01-31 NOTE — ED NOTES
Pt belongings: shirt, pants, shoes, socks. All secured outside of the room.   No security envelope needed.

## 2024-01-31 NOTE — ED NOTES
"Pt presents to the ED, he has an unclean unkept appearance. Pt endorses command AH's to hurt others "but I would not act on it". Pt endorses VH's of shadows. Pt denies SI's. Pt reports that he was on a monthly shot "invega" but has not had it in a while. Pt remains calm and cooperative. Pt denies pain or discomfort. Pt informed of the PEC placement process, he verbalizes understanding. Pt lying on the stretcher resting, RN outside of the room DVC maintained.   "

## 2024-01-31 NOTE — PROVIDER PROGRESS NOTES - EMERGENCY DEPT.
"Encounter Date: 1/31/2024    ED Physician Progress Notes          I assumed care of patient at sign out pending: transfer.  54-year-old male with a history of schizophrenia, not currently taking medications, voices are telling him to kill people.  No SI.  On evaluation, the patient states he is doing well.  No complaints at this time.    Exam: Benign.  Vitals:    01/31/24 0825 01/31/24 1200   BP: (!) 154/87 128/76   Pulse: 69 67   Resp: 20 16   Temp: 97.9 °F (36.6 °C) 98 °F (36.7 °C)   TempSrc: Oral    SpO2: 100% 98%   Weight: 77.1 kg (170 lb)    Height: 6' 3" (1.905 m)      No orders to display     Labs Reviewed   CBC W/ AUTO DIFFERENTIAL - Abnormal; Notable for the following components:       Result Value    Hemoglobin 13.9 (*)     MCHC 31.7 (*)     RDW 14.7 (*)     All other components within normal limits    Narrative:     Release to patient->Immediate   COMPREHENSIVE METABOLIC PANEL - Abnormal; Notable for the following components:    Glucose 61 (*)     BUN 24 (*)     All other components within normal limits    Narrative:     Release to patient->Immediate   URINALYSIS, REFLEX TO URINE CULTURE - Abnormal; Notable for the following components:    Protein, UA Trace (*)     All other components within normal limits    Narrative:     Specimen Source->Urine   DRUG SCREEN PANEL, URINE EMERGENCY - Abnormal; Notable for the following components:    Cocaine (Metab.) Presumptive Positive (*)     All other components within normal limits    Narrative:     Specimen Source->Urine   ACETAMINOPHEN LEVEL - Abnormal; Notable for the following components:    Acetaminophen (Tylenol), Serum <3.0 (*)     All other components within normal limits    Narrative:     Release to patient->Immediate   SALICYLATE LEVEL - Abnormal; Notable for the following components:    Salicylate Lvl <5.0 (*)     All other components within normal limits    Narrative:     Release to patient->Immediate   LITHIUM LEVEL - Abnormal; Notable for the following " components:    Lithium Level 0.1 (*)     All other components within normal limits    Narrative:     Release to patient->Immediate   HIV 1 / 2 ANTIBODY    Narrative:     Release to patient->Immediate   HEPATITIS C ANTIBODY    Narrative:     Release to patient->Immediate   TSH    Narrative:     Release to patient->Immediate   ALCOHOL,MEDICAL (ETHANOL)    Narrative:     Release to patient->Immediate         Summary of Results:  For prior team, medically clear just awaiting transfer to psychiatric facility..  No acute events    Disposition:  Transfer to psychiatric facility.

## 2024-01-31 NOTE — ED PROVIDER NOTES
Encounter Date: 1/31/2024       History     Chief Complaint   Patient presents with    Psychiatric Evaluation     Hearing voices, telling me to hurt people, talking to self in triage     Schizophrenia hx off meds with AH telling him to kill people. No SI.       Review of patient's allergies indicates:  No Known Allergies  Past Medical History:   Diagnosis Date    Bipolar disorder, unspecified     Schizophrenia, unspecified      No past surgical history on file.  No family history on file.  Social History     Tobacco Use    Smoking status: Former     Types: Cigarettes    Smokeless tobacco: Never   Substance Use Topics    Alcohol use: Not Currently    Drug use: Not Currently     Review of Systems    Physical Exam     Initial Vitals [01/31/24 0825]   BP Pulse Resp Temp SpO2   (!) 154/87 69 20 97.9 °F (36.6 °C) 100 %      MAP       --         Physical Exam    Nursing note and vitals reviewed.  HENT:   Head: Normocephalic and atraumatic.   Eyes: EOM are normal. Pupils are equal, round, and reactive to light.   Neck: Neck supple. No JVD present.   Normal range of motion.  Cardiovascular:  Normal rate and regular rhythm.           Pulmonary/Chest: Breath sounds normal. No stridor. No respiratory distress.   Abdominal: Abdomen is soft. There is no abdominal tenderness.   Musculoskeletal:         General: No tenderness or edema. Normal range of motion.      Cervical back: Normal range of motion and neck supple.     Neurological: He is alert and oriented to person, place, and time. GCS score is 15. GCS eye subscore is 4. GCS verbal subscore is 5. GCS motor subscore is 6.   Skin: Skin is warm and dry. Capillary refill takes less than 2 seconds.   Psychiatric:   AH+, HI+         ED Course   Procedures  Labs Reviewed   CBC W/ AUTO DIFFERENTIAL - Abnormal; Notable for the following components:       Result Value    Hemoglobin 13.9 (*)     MCHC 31.7 (*)     RDW 14.7 (*)     All other components within normal limits    Narrative:      Release to patient->Immediate   COMPREHENSIVE METABOLIC PANEL - Abnormal; Notable for the following components:    Glucose 61 (*)     BUN 24 (*)     All other components within normal limits    Narrative:     Release to patient->Immediate   URINALYSIS, REFLEX TO URINE CULTURE - Abnormal; Notable for the following components:    Protein, UA Trace (*)     All other components within normal limits    Narrative:     Specimen Source->Urine   DRUG SCREEN PANEL, URINE EMERGENCY - Abnormal; Notable for the following components:    Cocaine (Metab.) Presumptive Positive (*)     All other components within normal limits    Narrative:     Specimen Source->Urine   ACETAMINOPHEN LEVEL - Abnormal; Notable for the following components:    Acetaminophen (Tylenol), Serum <3.0 (*)     All other components within normal limits    Narrative:     Release to patient->Immediate   SALICYLATE LEVEL - Abnormal; Notable for the following components:    Salicylate Lvl <5.0 (*)     All other components within normal limits    Narrative:     Release to patient->Immediate   LITHIUM LEVEL - Abnormal; Notable for the following components:    Lithium Level 0.1 (*)     All other components within normal limits    Narrative:     Release to patient->Immediate   HIV 1 / 2 ANTIBODY    Narrative:     Release to patient->Immediate   HEPATITIS C ANTIBODY    Narrative:     Release to patient->Immediate   TSH    Narrative:     Release to patient->Immediate   ALCOHOL,MEDICAL (ETHANOL)    Narrative:     Release to patient->Immediate          Imaging Results    None          Medications - No data to display  Medical Decision Making  Hemodynamically stable. Afebrile. Phonating and protecting the airway spontaneously. No clinical evidence for cardiovascular instability or impending airway compromise. Examination as above. Prior medical records reviewed. Hx of mental illness needing psychiatric admission. Current co-morbidities considered that will impact  clinical decision making include as above.    Plan:  Labs, psych placement. Offered medications for mood, he did not want them. Not agitated at this time.       Amount and/or Complexity of Data Reviewed  Labs: ordered.               ED Course as of 01/31/24 1008   Wed Jan 31, 2024   1007 Labs reviewed. CBC negative. CMP negative. UDS negative. Medically clear.  [BG]      ED Course User Index  [BG] Corey Schmidt MD       Medically cleared for psychiatry placement: 1/31/2024 10:07 AM                   Clinical Impression:  Final diagnoses:  [R45.850] Homicidal ideation (Primary)  [F29] Psychosis, unspecified psychosis type          ED Disposition Condition    Transfer to Psych Facility Stable          ED Prescriptions    None       Follow-up Information    None          Corey Schmidt MD  01/31/24 1008

## 2024-01-31 NOTE — ED NOTES
Pt informed of his pending transfer to [facility]. He verbalizes understanding.

## 2024-03-28 ENCOUNTER — HOSPITAL ENCOUNTER (EMERGENCY)
Facility: HOSPITAL | Age: 54
Discharge: PSYCHIATRIC OR SUBSTANCE USE FACILITY | End: 2024-03-28
Attending: EMERGENCY MEDICINE
Payer: MEDICAID

## 2024-03-28 ENCOUNTER — HOSPITAL ENCOUNTER (INPATIENT)
Facility: HOSPITAL | Age: 54
LOS: 6 days | Discharge: HOME | DRG: 885 | End: 2024-04-03
Attending: PSYCHIATRY & NEUROLOGY | Admitting: PSYCHIATRY & NEUROLOGY
Payer: MEDICAID

## 2024-03-28 VITALS
OXYGEN SATURATION: 100 % | HEART RATE: 79 BPM | DIASTOLIC BLOOD PRESSURE: 92 MMHG | TEMPERATURE: 99 F | SYSTOLIC BLOOD PRESSURE: 136 MMHG | RESPIRATION RATE: 18 BRPM | WEIGHT: 185 LBS | HEIGHT: 75 IN | BODY MASS INDEX: 23 KG/M2

## 2024-03-28 DIAGNOSIS — F29 PSYCHOSIS: ICD-10-CM

## 2024-03-28 DIAGNOSIS — R45.850 HOMICIDAL IDEATION: ICD-10-CM

## 2024-03-28 DIAGNOSIS — F23 ACUTE PSYCHOSIS: Primary | ICD-10-CM

## 2024-03-28 DIAGNOSIS — F20.9 SCHIZOPHRENIA, UNSPECIFIED TYPE: ICD-10-CM

## 2024-03-28 PROBLEM — F25.0 SCHIZOAFFECTIVE DISORDER, BIPOLAR TYPE: Status: ACTIVE | Noted: 2024-03-28

## 2024-03-28 PROBLEM — F19.10 POLYSUBSTANCE ABUSE: Status: RESOLVED | Noted: 2024-03-28 | Resolved: 2024-03-28

## 2024-03-28 PROBLEM — F19.10 POLYSUBSTANCE ABUSE: Status: ACTIVE | Noted: 2024-03-28

## 2024-03-28 PROBLEM — Z91.199 PATIENT NONADHERENCE: Status: ACTIVE | Noted: 2024-03-28

## 2024-03-28 PROBLEM — R45.851 SUICIDE IDEATION: Status: ACTIVE | Noted: 2024-03-28

## 2024-03-28 LAB
ALBUMIN SERPL-MCNC: 3.5 G/DL (ref 3.5–5)
ALBUMIN/GLOB SERPL: 1 RATIO (ref 1.1–2)
ALP SERPL-CCNC: 56 UNIT/L (ref 40–150)
ALT SERPL-CCNC: 16 UNIT/L (ref 0–55)
AMPHET UR QL SCN: POSITIVE
APAP SERPL-MCNC: <17.4 UG/ML (ref 17.4–30)
APPEARANCE UR: CLEAR
AST SERPL-CCNC: 24 UNIT/L (ref 5–34)
BACTERIA #/AREA URNS AUTO: NORMAL /HPF
BARBITURATE SCN PRESENT UR: NEGATIVE
BASOPHILS # BLD AUTO: 0.06 X10(3)/MCL
BASOPHILS NFR BLD AUTO: 0.8 %
BENZODIAZ UR QL SCN: NEGATIVE
BILIRUB SERPL-MCNC: 0.2 MG/DL
BILIRUB UR QL STRIP.AUTO: NEGATIVE
BUN SERPL-MCNC: 19.7 MG/DL (ref 8.4–25.7)
CALCIUM SERPL-MCNC: 9 MG/DL (ref 8.4–10.2)
CANNABINOIDS UR QL SCN: NEGATIVE
CHLORIDE SERPL-SCNC: 107 MMOL/L (ref 98–107)
CO2 SERPL-SCNC: 23 MMOL/L (ref 22–29)
COCAINE UR QL SCN: POSITIVE
COLOR UR AUTO: COLORLESS
CREAT SERPL-MCNC: 1.16 MG/DL (ref 0.73–1.18)
EOSINOPHIL # BLD AUTO: 0.17 X10(3)/MCL (ref 0–0.9)
EOSINOPHIL NFR BLD AUTO: 2.4 %
ERYTHROCYTE [DISTWIDTH] IN BLOOD BY AUTOMATED COUNT: 13.2 % (ref 11.5–17)
ETHANOL SERPL-MCNC: <10 MG/DL
FENTANYL UR QL SCN: NEGATIVE
GFR SERPLBLD CREATININE-BSD FMLA CKD-EPI: >60 MLS/MIN/1.73/M2
GLOBULIN SER-MCNC: 3.6 GM/DL (ref 2.4–3.5)
GLUCOSE SERPL-MCNC: 112 MG/DL (ref 70–110)
GLUCOSE SERPL-MCNC: 64 MG/DL (ref 74–100)
GLUCOSE UR QL STRIP.AUTO: NORMAL
HCT VFR BLD AUTO: 41.8 % (ref 42–52)
HGB BLD-MCNC: 13.6 G/DL (ref 14–18)
IMM GRANULOCYTES # BLD AUTO: 0.01 X10(3)/MCL (ref 0–0.04)
IMM GRANULOCYTES NFR BLD AUTO: 0.1 %
KETONES UR QL STRIP.AUTO: NEGATIVE
LEUKOCYTE ESTERASE UR QL STRIP.AUTO: NEGATIVE
LYMPHOCYTES # BLD AUTO: 2.31 X10(3)/MCL (ref 0.6–4.6)
LYMPHOCYTES NFR BLD AUTO: 32.5 %
MCH RBC QN AUTO: 30.4 PG (ref 27–31)
MCHC RBC AUTO-ENTMCNC: 32.5 G/DL (ref 33–36)
MCV RBC AUTO: 93.5 FL (ref 80–94)
MDMA UR QL SCN: NEGATIVE
MONOCYTES # BLD AUTO: 0.9 X10(3)/MCL (ref 0.1–1.3)
MONOCYTES NFR BLD AUTO: 12.7 %
NEUTROPHILS # BLD AUTO: 3.65 X10(3)/MCL (ref 2.1–9.2)
NEUTROPHILS NFR BLD AUTO: 51.5 %
NITRITE UR QL STRIP.AUTO: NEGATIVE
NRBC BLD AUTO-RTO: 0 %
OPIATES UR QL SCN: NEGATIVE
PCP UR QL: NEGATIVE
PH UR STRIP.AUTO: 6 [PH]
PH UR: 6 [PH] (ref 3–11)
PLATELET # BLD AUTO: 165 X10(3)/MCL (ref 130–400)
PMV BLD AUTO: 12.1 FL (ref 7.4–10.4)
POCT GLUCOSE: 112 MG/DL (ref 70–110)
POTASSIUM SERPL-SCNC: 4 MMOL/L (ref 3.5–5.1)
PROT SERPL-MCNC: 7.1 GM/DL (ref 6.4–8.3)
PROT UR QL STRIP.AUTO: NEGATIVE
RBC # BLD AUTO: 4.47 X10(6)/MCL (ref 4.7–6.1)
RBC #/AREA URNS AUTO: NORMAL /HPF
RBC UR QL AUTO: NEGATIVE
SARS-COV-2 RDRP RESP QL NAA+PROBE: NEGATIVE
SODIUM SERPL-SCNC: 139 MMOL/L (ref 136–145)
SP GR UR STRIP.AUTO: 1.02 (ref 1–1.03)
SPECIFIC GRAVITY, URINE AUTO (.000) (OHS): 1.02 (ref 1–1.03)
SQUAMOUS #/AREA URNS LPF: NORMAL /HPF
TSH SERPL-ACNC: 1.86 UIU/ML (ref 0.35–4.94)
UROBILINOGEN UR STRIP-ACNC: NORMAL
WBC # SPEC AUTO: 7.1 X10(3)/MCL (ref 4.5–11.5)
WBC #/AREA URNS AUTO: NORMAL /HPF

## 2024-03-28 PROCEDURE — 99285 EMERGENCY DEPT VISIT HI MDM: CPT

## 2024-03-28 PROCEDURE — 80307 DRUG TEST PRSMV CHEM ANLYZR: CPT | Performed by: EMERGENCY MEDICINE

## 2024-03-28 PROCEDURE — 80053 COMPREHEN METABOLIC PANEL: CPT | Performed by: EMERGENCY MEDICINE

## 2024-03-28 PROCEDURE — 81001 URINALYSIS AUTO W/SCOPE: CPT | Performed by: EMERGENCY MEDICINE

## 2024-03-28 PROCEDURE — 82077 ASSAY SPEC XCP UR&BREATH IA: CPT | Performed by: EMERGENCY MEDICINE

## 2024-03-28 PROCEDURE — 84443 ASSAY THYROID STIM HORMONE: CPT | Performed by: EMERGENCY MEDICINE

## 2024-03-28 PROCEDURE — 80143 DRUG ASSAY ACETAMINOPHEN: CPT | Performed by: EMERGENCY MEDICINE

## 2024-03-28 PROCEDURE — 85025 COMPLETE CBC W/AUTO DIFF WBC: CPT | Performed by: EMERGENCY MEDICINE

## 2024-03-28 PROCEDURE — 25000003 PHARM REV CODE 250: Performed by: PSYCHIATRY & NEUROLOGY

## 2024-03-28 PROCEDURE — 82962 GLUCOSE BLOOD TEST: CPT

## 2024-03-28 PROCEDURE — 87635 SARS-COV-2 COVID-19 AMP PRB: CPT | Performed by: EMERGENCY MEDICINE

## 2024-03-28 PROCEDURE — 11400000 HC PSYCH PRIVATE ROOM

## 2024-03-28 RX ORDER — ACETAMINOPHEN 325 MG/1
650 TABLET ORAL EVERY 6 HOURS PRN
Status: DISCONTINUED | OUTPATIENT
Start: 2024-03-28 | End: 2024-04-03 | Stop reason: HOSPADM

## 2024-03-28 RX ORDER — LORAZEPAM 1 MG/1
1 TABLET ORAL 4 TIMES DAILY
Status: DISCONTINUED | OUTPATIENT
Start: 2024-03-28 | End: 2024-03-30

## 2024-03-28 RX ORDER — OLANZAPINE 10 MG/1
10 TABLET, ORALLY DISINTEGRATING ORAL EVERY 8 HOURS PRN
Status: DISCONTINUED | OUTPATIENT
Start: 2024-03-28 | End: 2024-04-03 | Stop reason: HOSPADM

## 2024-03-28 RX ORDER — HYDROXYZINE PAMOATE 25 MG/1
50 CAPSULE ORAL EVERY 6 HOURS PRN
Status: DISCONTINUED | OUTPATIENT
Start: 2024-03-28 | End: 2024-04-03 | Stop reason: HOSPADM

## 2024-03-28 RX ORDER — IBUPROFEN 400 MG/1
400 TABLET ORAL EVERY 6 HOURS PRN
Status: DISCONTINUED | OUTPATIENT
Start: 2024-03-28 | End: 2024-04-01 | Stop reason: SDUPTHER

## 2024-03-28 RX ORDER — OLANZAPINE 10 MG/1
10 TABLET ORAL NIGHTLY
Status: DISCONTINUED | OUTPATIENT
Start: 2024-03-28 | End: 2024-04-03 | Stop reason: HOSPADM

## 2024-03-28 RX ADMIN — OLANZAPINE 10 MG: 10 TABLET, FILM COATED ORAL at 08:03

## 2024-03-28 RX ADMIN — LORAZEPAM 1 MG: 1 TABLET ORAL at 04:03

## 2024-03-28 RX ADMIN — LORAZEPAM 1 MG: 1 TABLET ORAL at 08:03

## 2024-03-28 NOTE — SUBJECTIVE & OBJECTIVE
Past Medical History:   Diagnosis Date    Bipolar disorder, unspecified     Schizophrenia, unspecified        No past surgical history on file.    Review of patient's allergies indicates:  No Known Allergies    No current facility-administered medications on file prior to encounter.     Current Outpatient Medications on File Prior to Encounter   Medication Sig    ARIPiprazole (ABILIFY) 5 MG Tab Take 1 tablet (5 mg total) by mouth once daily. (Patient not taking: Reported on 3/28/2024)    hydrOXYzine pamoate (VISTARIL) 25 MG Cap Take 1 capsule (25 mg total) by mouth every 6 (six) hours as needed (Insomnia). (Patient not taking: Reported on 3/28/2024)     Family History    None       Tobacco Use    Smoking status: Former     Types: Cigarettes    Smokeless tobacco: Never   Substance and Sexual Activity    Alcohol use: Not Currently    Drug use: Not Currently    Sexual activity: Not on file     Review of Systems   Constitutional: Negative.    HENT: Negative.     Respiratory:  Negative for shortness of breath.    Cardiovascular:  Negative for chest pain and leg swelling.   Genitourinary: Negative.    Musculoskeletal: Negative.    Skin: Negative.    Neurological: Negative.    Psychiatric/Behavioral:  Positive for behavioral problems and dysphoric mood. Negative for agitation, confusion, hallucinations and suicidal ideas.      Objective:     Vital Signs (Most Recent):  Temp: 98 °F (36.7 °C) (03/28/24 1040)  Pulse: 60 (03/28/24 1040)  Resp: 18 (03/28/24 1040)  BP: 133/89 (03/28/24 1040)  SpO2: 100 % (03/28/24 1040) Vital Signs (24h Range):  Temp:  [97.7 °F (36.5 °C)-98.7 °F (37.1 °C)] 98 °F (36.7 °C)  Pulse:  [55-79] 60  Resp:  [18] 18  SpO2:  [98 %-100 %] 100 %  BP: (130-147)/(88-97) 133/89     Weight: 75.2 kg (165 lb 12.6 oz)  Body mass index is 20.72 kg/m².     Physical Exam  Vitals reviewed. Exam conducted with a chaperone present.   HENT:      Head: Normocephalic and atraumatic.      Nose: Nose normal.   Eyes:       Extraocular Movements: Extraocular movements intact and EOM normal.      Conjunctiva/sclera: Conjunctivae normal.      Pupils: Pupils are equal, round, and reactive to light.   Cardiovascular:      Rate and Rhythm: Normal rate and regular rhythm.      Heart sounds: Normal heart sounds. No murmur heard.     No gallop.   Pulmonary:      Effort: Pulmonary effort is normal. No respiratory distress.      Breath sounds: No wheezing, rhonchi or rales.   Musculoskeletal:         General: No swelling or deformity. Normal range of motion.      Cervical back: Normal range of motion.      Right lower leg: No edema.      Left lower leg: No edema.   Skin:     General: Skin is warm and dry.      Coloration: Skin is not jaundiced.      Findings: No rash.   Neurological:      General: No focal deficit present.      Mental Status: He is alert and oriented to person, place, and time.   Psychiatric:         Mood and Affect: Mood is depressed. Affect is flat.         Behavior: Behavior is slowed. Behavior is cooperative.         CRANIAL NERVES     CN I  cranial nerve I not tested    CN II   Visual fields full to confrontation.     CN III, IV, VI   Pupils are equal, round, and reactive to light.  Extraocular motions are normal.   Right pupil: Accommodation: intact.   Left pupil: Accommodation: intact.   CN III: no CN III palsy  CN VI: no CN VI palsy    CN V   Facial sensation intact.   Right facial sensation deficit: none  Left facial sensation deficit: none    CN VII   Facial expression full, symmetric.   Right facial weakness: none  Left facial weakness: none    CN VIII   CN VIII normal.   Hearing: intact    CN IX, X   CN IX normal.   CN X normal.   Palate: symmetric    CN XI   CN XI normal.   Right sternocleidomastoid strength: normal  Left sternocleidomastoid strength: normal  Right trapezius strength: normal  Left trapezius strength: normal    CN XII   CN XII normal.   Tongue: not atrophic  Fasciculations: absent  Tongue deviation:  none         Significant Labs: All pertinent labs within the past 24 hours have been reviewed.  Recent Lab Results  (Last 5 results in the past 24 hours)        03/28/24  0328   03/28/24  0328   03/28/24  0227   03/28/24  0224   03/28/24  0221        Phencyclidine         Negative       Albumin/Globulin Ratio     1.0           Acetaminophen Level     <17.4           Albumin     3.5           Alcohol, Serum     <10.0  Comment: This assay is performed for medical purposes only.           ALP     56           ALT     16           Amphetamines, Urine         Positive       Appearance, UA         Clear       AST     24           Bacteria, UA         None Seen       Barbituates, Urine         Negative       Baso #     0.06           Basophil %     0.8           Benzodiazepine, Urine         Negative       BILIRUBIN TOTAL     0.2           Bilirubin, UA         Negative       BUN     19.7           Calcium     9.0           Cannabinoids, Urine         Negative       Chloride     107           CO2     23           Cocaine, Urine         Positive       Color, UA         Colorless       ID NOW COVID-19, (MISTI)       Negative         Creatinine     1.16           eGFR     >60           Eos #     0.17           Eos %     2.4           Fentanyl, Urine         Negative       Globulin, Total     3.6           Glucose     64           Glucose, UA         Normal       Hematocrit     41.8           Hemoglobin     13.6           Immature Grans (Abs)     0.01           Immature Granulocytes     0.1           Ketones, UA         Negative       Leukocyte Esterase, UA         Negative       Lymph #     2.31           LYMPH %     32.5           MCH     30.4           MCHC     32.5           MCV     93.5           MDMA, Urine         Negative       Mono #     0.90           Mono %     12.7           MPV     12.1           Neut #     3.65           Neut %     51.5           NITRITE UA         Negative       nRBC     0.0           Blood, UA          Negative       Opiates, Urine         Negative       pH, UA         6.0       pH, Urine         6.0       Platelet Count     165           POC Glucose   112             POCT Glucose 112               Potassium     4.0           PROTEIN TOTAL     7.1           Protein, UA         Negative       RBC     4.47           RBC, UA         0-5       RDW     13.2           Sodium     139           Specific Gravity,UA         1.022       Specific Gravity, Urine Auto         1.022       Squamous Epithelial Cells, UA         Trace       TSH     1.864           Urobilinogen, UA         Normal       WBC, UA         None Seen       WBC     7.10                                  Significant Imaging: I have reviewed all pertinent imaging results/findings within the past 24 hours.

## 2024-03-28 NOTE — SUBJECTIVE & OBJECTIVE
Patient History               Medical as of 3/28/2024       Past Medical History       Diagnosis Date Comments Source    Bipolar disorder, unspecified -- -- Provider    History of psychiatric hospitalization -- -- Provider    Hx of psychiatric care -- -- Provider    [facility]ia -- -- Provider    Psychiatric problem -- -- Provider    Schizophrenia, unspecified -- -- Patient    Substance abuse -- -- Provider                          Surgical as of 3/28/2024    Past Surgical History: Patient provided no pertinent surgical history.               Family as of 3/28/2024       Problem Relation Name Age of Onset Comments Source    Depression Mother -- -- -- Provider    Depression Brother -- -- -- Provider                  Tobacco Use as of 3/28/2024       Smoking Status Smoking Start Date Quit Date Current Packs/Day Average Packs/Day    Former -- -- --       Smokeless Status Smokeless Type Smokeless Quit Date    Never -- --      Source    Provider                  Alcohol Use as of 3/28/2024       Alcohol Use Drinks/Week Alcohol/Week Comments Source    Not Currently   -- -- Provider                  Drug Use as of 3/28/2024       Drug Use Types Frequency Comments Source    Yes  Cocaine, Methamphetamines -- -- Provider                  Sexual Activity as of 3/28/2024    None               Activities of Daily Living as of 3/28/2024    None               Social Documentation as of 3/28/2024    ** Merged History Encounter **   Source:                Occupational as of 3/28/2024    None               Socioeconomic as of 3/28/2024       Marital Status Spouse Name Number of Children Years Education Education Level Preferred Language Ethnicity Race Source    Single -- 4 -- -- English Not  or /a Black or  Provider                  Pertinent History       Question Response Comments    Lives with alone --    Place in Birth Order -- --    Lives in home --    Number of Siblings -- --    Raised by biological  parents --    Legal Involvement none --    Childhood Trauma uneventful --    Criminal History of none --    Financial Status unemployed --    Highest Level of Education unfinished college --    Does patient have access to a firearm? No --     Service No --    Primary Leisure Activity -- --    Spirituality -- --          Past Medical History:   Diagnosis Date    Bipolar disorder, unspecified     History of psychiatric hospitalization     Hx of psychiatric care     Arcelia     Psychiatric problem     Schizophrenia, unspecified     Substance abuse      No past surgical history on file.  Family History       Problem Relation (Age of Onset)    Depression Mother, Brother          Tobacco Use    Smoking status: Former     Types: Cigarettes    Smokeless tobacco: Never   Substance and Sexual Activity    Alcohol use: Not Currently    Drug use: Yes     Types: Cocaine, Methamphetamines    Sexual activity: Not on file     Review of patient's allergies indicates:  No Known Allergies    No current facility-administered medications on file prior to encounter.     Current Outpatient Medications on File Prior to Encounter   Medication Sig    ARIPiprazole (ABILIFY) 5 MG Tab Take 1 tablet (5 mg total) by mouth once daily. (Patient not taking: Reported on 3/28/2024)    hydrOXYzine pamoate (VISTARIL) 25 MG Cap Take 1 capsule (25 mg total) by mouth every 6 (six) hours as needed (Insomnia). (Patient not taking: Reported on 3/28/2024)     Psychotherapeutics (From admission, onward)      Start     Stop Route Frequency Ordered    03/28/24 2100  OLANZapine tablet 10 mg         -- Oral Nightly 03/28/24 1537    03/28/24 1300  LORazepam tablet 1 mg         -- Oral 4 times daily 03/28/24 1206    03/28/24 1206  OLANZapine zydis disintegrating tablet 10 mg         -- Oral Every 8 hours PRN 03/28/24 1206          Review of Systems   Psychiatric/Behavioral:  Positive for decreased concentration, dysphoric mood, hallucinations and suicidal ideas.  "The patient is nervous/anxious.      Strengths and Liabilities:  Strengths:  Good intellect, motivated for change   weaknesses:  Poor adherence to medications, lack of strong family support network.    Objective:     Vital Signs (Most Recent):  Temp: 98 °F (36.7 °C) (03/28/24 1040)  Pulse: 68 (03/28/24 1652)  Resp: 18 (03/28/24 1652)  BP: (!) 136/92 (03/28/24 1652)  SpO2: 98 % (03/28/24 1652) Vital Signs (24h Range):  Temp:  [97.7 °F (36.5 °C)-98.7 °F (37.1 °C)] 98 °F (36.7 °C)  Pulse:  [55-79] 68  Resp:  [18] 18  SpO2:  [98 %-100 %] 98 %  BP: (130-147)/(88-97) 136/92     Height: 6' 3" (190.5 cm)  Weight: 75.2 kg (165 lb 12.6 oz)  Body mass index is 20.72 kg/m².    No intake or output data in the 24 hours ending 03/28/24 1707       Physical Exam mental status examination:  54-year-old  male who is very thin for overall appearance.  He was significantly psychomotor restless.  His speech was with good articulation when produced his thought content demonstrated complaints of visual distortions as ego-dystonic and distressing to him.  He also reports ongoing challenges in terms of response to auditory hallucinations which he states were command in nature instructed to harm self or harm others there continued to be hypervigilance.  His insight and judgment deemed limited poor.      On cognitive evaluation patient was alert and oriented to person, place, setting and time.  There was heightened levels of psychomotor restlessness as if patient was continues to be still overly stimulated with methamphetamines.  His immediate memory is 3/3 objects immediately.  1/3 objects 5 minutes.  He was grossly inattentive.  His fund of knowledge is deemed to be average for his level education.  He was adequately abstract.        Significant Labs: Last 72 Hours:   Recent Lab Results  (Last 5 results in the past 72 hours)        03/28/24  0328   03/28/24  0328   03/28/24  0227   03/28/24  0224   03/28/24  0221        " Phencyclidine         Negative       Albumin/Globulin Ratio     1.0           Acetaminophen Level     <17.4           Albumin     3.5           Alcohol, Serum     <10.0  Comment: This assay is performed for medical purposes only.           ALP     56           ALT     16           Amphetamines, Urine         Positive       Appearance, UA         Clear       AST     24           Bacteria, UA         None Seen       Barbituates, Urine         Negative       Baso #     0.06           Basophil %     0.8           Benzodiazepine, Urine         Negative       BILIRUBIN TOTAL     0.2           Bilirubin, UA         Negative       BUN     19.7           Calcium     9.0           Cannabinoids, Urine         Negative       Chloride     107           CO2     23           Cocaine, Urine         Positive       Color, UA         Colorless       ID NOW COVID-19, (MISTI)       Negative         Creatinine     1.16           eGFR     >60           Eos #     0.17           Eos %     2.4           Fentanyl, Urine         Negative       Globulin, Total     3.6           Glucose     64           Glucose, UA         Normal       Hematocrit     41.8           Hemoglobin     13.6           Immature Grans (Abs)     0.01           Immature Granulocytes     0.1           Ketones, UA         Negative       Leukocyte Esterase, UA         Negative       Lymph #     2.31           LYMPH %     32.5           MCH     30.4           MCHC     32.5           MCV     93.5           MDMA, Urine         Negative       Mono #     0.90           Mono %     12.7           MPV     12.1           Neut #     3.65           Neut %     51.5           NITRITE UA         Negative       nRBC     0.0           Blood, UA         Negative       Opiates, Urine         Negative       pH, UA         6.0       pH, Urine         6.0       Platelet Count     165           POC Glucose   112             POCT Glucose 112               Potassium     4.0           PROTEIN TOTAL      7.1           Protein, UA         Negative       RBC     4.47           RBC, UA         0-5       RDW     13.2           Sodium     139           Specific Gravity,UA         1.022       Specific Gravity, Urine Auto         1.022       Squamous Epithelial Cells, UA         Trace       TSH     1.864           Urobilinogen, UA         Normal       WBC, UA         None Seen       WBC     7.10                                  Significant Imaging: None

## 2024-03-28 NOTE — ASSESSMENT & PLAN NOTE
Patient was suspected to have a chronic pattern nonadherence with medications.  Patient was benefit for referral to assertive Community treatment program versus referral to residential.  Certainly if he was ever return to home community patient was benefit tremendously from support my outpatient agencies to ensure and enhance his compliance.

## 2024-03-28 NOTE — PROGRESS NOTES
Recreation Therapy Progress Note    Date:  03/28/2024    Time:  1400    Group Title:  Leisure skills    Mood:  Patient depressed anxious and guarded    Behavior:  Patient came to group but quickly live group states he was not feeling good.  Patient wanted to sleep patient's excused.  Patient is going through detox    Affect:  Patient depressed low energy and anxious    Speech:  Normal    Cognition:  Patient was not able to focused on cognitive activity due to depression anxiety and detox    Participation Level:  Patient excuse from group patient not feeling well and detoxing    Intervention:  Continue RT services when patient is feeling better.  Patient is a new admit today          Recreation Therapist   Signature:  Dorys Graff ma ctrs
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
## 2024-03-28 NOTE — HPI
54-year-old  male who at this time presents with evidence of psychosis.  Patient this time states he has been hearing increased response to internal stimuli persistent over the past 7 years and episodically does resolve with medications.  Patient was admits to a period of which he was not been adherent to medications and most recently has decompensated once again with increased distortions reality testing but also comorbid history of which he has been abusing psychoactive substances.    Patient was this time states he began to hear voices 7 years ago.  He describes overall symptom complexes characterized by the followin. Command hallucinations to harm self or harm others.  2. Marked mood instability with the doors phoria  3.  Ever present paranoia   4. Increased degrees of isolation withdrawal  5. Sleep disturbance difficulty getting asleep and staying asleep   6. Decreased interest was pleasurable activities   7. Feelings of being anxious overwhelmed.    8. History of weight loss of greater than 10% of body weight as a result of mood substance use problems.      Patient was has a long established history of substance use going back into adolescence.  He reports his 1st drug of use was cannabis at age 20.  He states rapidly he progressed to cocaine thereafter.  Patient was most recently has been using cocaine as well as methamphetamines.  He states he does smoke both.  He also has a history of previous use of opiate compounds post injury while playing basketball.  He states he did get hooked on multiple agents orally at the time.  His drug of choice however shifted to the psychostimulants.    Patient does not report any significant difficulties in terms of alcohol use.    Patient was denies any history of physical or emotional trauma or sexual trauma in his youth.    He denies any history of assaultive aggressive behavior towards others.    Patient does admit to previous patterns of thoughts to  self injure but denies any recent attempts.      Patient describes having upwards of 26 previous inpatient hospitalizations over the past 7 years.  He states his last hospitalization was some 8 to 9 months ago.  He states he will follow up with [facility]  however after a couple of visits he usually stops going.      Patient was reports he was a family history difficulties in terms of mental health problems with his biological mother, biological at biological brother.  He was not exactly certain what overall history might be however.    Patient originally from [place] was relocated after [place].  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  He was states he was last participated in your P in basketball back at the year 2002.  Patient was states his career in did with the ankle injury.    Patient was does have 2 years of college.

## 2024-03-28 NOTE — CONSULTS
[facility] Hospital Medicine  Consult Note    Patient Name: Kevin Dukes  MRN: [id number]  Admission Date: 3/28/2024  Hospital Length of Stay: 0 days  Attending Physician: Vega Engel MD   Primary Care Provider: Maureen Primary Doctor           Patient information was obtained from ER records.     Consults  Subjective:     Principal Problem: psycosis    Chief Complaint: HI and AH       HPI: 55 yo AAM [place] [facility] [place]24 with AH and HI. UDS + cocaine and amphetamines. He was transferred to [facility] for further treatment. PMH schizophrenia, bipolar disorder, polysubstance abuse, noncompliant with medications. Pt denies SOB, CP, trouble tasting or smelling. Pt was cooperative for examination.     Past Medical History:   Diagnosis Date    Bipolar disorder, unspecified     Schizophrenia, unspecified        No past surgical history on file.    Review of patient's allergies indicates:  No Known Allergies    No current facility-administered medications on file prior to encounter.     Current Outpatient Medications on File Prior to Encounter   Medication Sig    ARIPiprazole (ABILIFY) 5 MG Tab Take 1 tablet (5 mg total) by mouth once daily. (Patient not taking: Reported on 3/28/2024)    hydrOXYzine pamoate (VISTARIL) 25 MG Cap Take 1 capsule (25 mg total) by mouth every 6 (six) hours as needed (Insomnia). (Patient not taking: Reported on 3/28/2024)     Family History    None       Tobacco Use    Smoking status: Former     Types: Cigarettes    Smokeless tobacco: Never   Substance and Sexual Activity    Alcohol use: Not Currently    Drug use: Not Currently    Sexual activity: Not on file     Review of Systems   Constitutional: Negative.    HENT: Negative.     Respiratory:  Negative for shortness of breath.    Cardiovascular:  Negative for chest pain and leg swelling.   Genitourinary: Negative.    Musculoskeletal: Negative.    Skin: Negative.    Neurological: Negative.     Psychiatric/Behavioral:  Positive for behavioral problems and dysphoric mood. Negative for agitation, confusion, hallucinations and suicidal ideas.      Objective:     Vital Signs (Most Recent):  Temp: 98 °F (36.7 °C) (03/28/24 1040)  Pulse: 60 (03/28/24 1040)  Resp: 18 (03/28/24 1040)  BP: 133/89 (03/28/24 1040)  SpO2: 100 % (03/28/24 1040) Vital Signs (24h Range):  Temp:  [97.7 °F (36.5 °C)-98.7 °F (37.1 °C)] 98 °F (36.7 °C)  Pulse:  [55-79] 60  Resp:  [18] 18  SpO2:  [98 %-100 %] 100 %  BP: (130-147)/(88-97) 133/89     Weight: 75.2 kg (165 lb 12.6 oz)  Body mass index is 20.72 kg/m².     Physical Exam  Vitals reviewed. Exam conducted with a chaperone present.   HENT:      Head: Normocephalic and atraumatic.      Nose: Nose normal.   Eyes:      Extraocular Movements: Extraocular movements intact and EOM normal.      Conjunctiva/sclera: Conjunctivae normal.      Pupils: Pupils are equal, round, and reactive to light.   Cardiovascular:      Rate and Rhythm: Normal rate and regular rhythm.      Heart sounds: Normal heart sounds. No murmur heard.     No gallop.   Pulmonary:      Effort: Pulmonary effort is normal. No respiratory distress.      Breath sounds: No wheezing, rhonchi or rales.   Musculoskeletal:         General: No swelling or deformity. Normal range of motion.      Cervical back: Normal range of motion.      Right lower leg: No edema.      Left lower leg: No edema.   Skin:     General: Skin is warm and dry.      Coloration: Skin is not jaundiced.      Findings: No rash.   Neurological:      General: No focal deficit present.      Mental Status: He is alert and oriented to person, place, and time.   Psychiatric:         Mood and Affect: Mood is depressed. Affect is flat.         Behavior: Behavior is slowed. Behavior is cooperative.         CRANIAL NERVES     CN I  cranial nerve I not tested    CN II   Visual fields full to confrontation.     CN III, IV, VI   Pupils are equal, round, and reactive to  light.  Extraocular motions are normal.   Right pupil: Accommodation: intact.   Left pupil: Accommodation: intact.   CN III: no CN III palsy  CN VI: no CN VI palsy    CN V   Facial sensation intact.   Right facial sensation deficit: none  Left facial sensation deficit: none    CN VII   Facial expression full, symmetric.   Right facial weakness: none  Left facial weakness: none    CN VIII   CN VIII normal.   Hearing: intact    CN IX, X   CN IX normal.   CN X normal.   Palate: symmetric    CN XI   CN XI normal.   Right sternocleidomastoid strength: normal  Left sternocleidomastoid strength: normal  Right trapezius strength: normal  Left trapezius strength: normal    CN XII   CN XII normal.   Tongue: not atrophic  Fasciculations: absent  Tongue deviation: none         Significant Labs: All pertinent labs within the past 24 hours have been reviewed.  Recent Lab Results  (Last 5 results in the past 24 hours)        03/28/24  0328   03/28/24  0328   03/28/24  0227   03/28/24  0224   03/28/24  0221        Phencyclidine         Negative       Albumin/Globulin Ratio     1.0           Acetaminophen Level     <17.4           Albumin     3.5           Alcohol, Serum     <10.0  Comment: This assay is performed for medical purposes only.           ALP     56           ALT     16           Amphetamines, Urine         Positive       Appearance, UA         Clear       AST     24           Bacteria, UA         None Seen       Barbituates, Urine         Negative       Baso #     0.06           Basophil %     0.8           Benzodiazepine, Urine         Negative       BILIRUBIN TOTAL     0.2           Bilirubin, UA         Negative       BUN     19.7           Calcium     9.0           Cannabinoids, Urine         Negative       Chloride     107           CO2     23           Cocaine, Urine         Positive       Color, UA         Colorless       ID NOW COVID-19, (MISTI)       Negative         Creatinine     1.16           eGFR     >60            Eos #     0.17           Eos %     2.4           Fentanyl, Urine         Negative       Globulin, Total     3.6           Glucose     64           Glucose, UA         Normal       Hematocrit     41.8           Hemoglobin     13.6           Immature Grans (Abs)     0.01           Immature Granulocytes     0.1           Ketones, UA         Negative       Leukocyte Esterase, UA         Negative       Lymph #     2.31           LYMPH %     32.5           MCH     30.4           MCHC     32.5           MCV     93.5           MDMA, Urine         Negative       Mono #     0.90           Mono %     12.7           MPV     12.1           Neut #     3.65           Neut %     51.5           NITRITE UA         Negative       nRBC     0.0           Blood, UA         Negative       Opiates, Urine         Negative       pH, UA         6.0       pH, Urine         6.0       Platelet Count     165           POC Glucose   112             POCT Glucose 112               Potassium     4.0           PROTEIN TOTAL     7.1           Protein, UA         Negative       RBC     4.47           RBC, UA         0-5       RDW     13.2           Sodium     139           Specific Gravity,UA         1.022       Specific Gravity, Urine Auto         1.022       Squamous Epithelial Cells, UA         Trace       TSH     1.864           Urobilinogen, UA         Normal       WBC, UA         None Seen       WBC     7.10                                  Significant Imaging: I have reviewed all pertinent imaging results/findings within the past 24 hours.  Assessment/Plan:     Psychosis  Mgt per psyc.  Rec cessation of illegal substances.   VS and labs reviewed and stable.        Polysubstance abuse        Homicidal ideation          VTE Risk Mitigation (From admission, onward)      None                Thank you for your consult. I will sign off. Please contact us if you have any additional questions.    ALLISON MONREAL, DO  Department of Hospital  Medicine   [facility]

## 2024-03-28 NOTE — ASSESSMENT & PLAN NOTE
Patient was reporting command hallucinations such that this would be high-risk to discharge patient was community.  Patient was not achieved adequate control overall psychotic symptoms.

## 2024-03-28 NOTE — HPI
55 [place] A[place] [facility] [place]24 with AH and HI. UDS + cocaine and amphetamines. He was transferred to [facility] for further treatment. PMH schizophrenia, bipolar disorder, polysubstance abuse, noncompliant with medications. Pt denies SOB, CP, trouble tasting or smelling. Pt was cooperative for examination.

## 2024-03-28 NOTE — ASSESSMENT & PLAN NOTE
54-year-old  male at this time struggling with ongoing difficulties in terms of paranoia or response to internal stimuli.  Patient was this time has a comorbid history of abuse of psychoactive chemicals that may be muddling of his current clinical picture.  Today he does appear to be a little bit restless, little bit mood stable, having persistent ongoing difficulties in terms of psychomotor restlessness has a functioning of his detoxification.    Patient was clearly need of additional psychopharmacologic individuals stabilization before going to a lower level of care.

## 2024-03-28 NOTE — ED PROVIDER NOTES
Encounter Date: 3/28/2024       History     Chief Complaint   Patient presents with    Hallucinations     C/o hearing voices telling to hurt and kill people pt states that he has been seen here for his mental health many times hx of schizophrenia and bipolar. Arielle SI requesting [facility] for placement if possible non complaint with psych medications.     54-year-old male with history of schizophrenia, bipolar disorder, noncompliant with medications, here for homicidal ideation.  He states he is hearing voices that are telling him to hurt and kill other people.  He has no specific plan.  He denies any suicidal ideation.  He does admit to using illicit drugs several days ago, none this evening.  No alcohol use.  He has otherwise been in his usual state of health.    The history is provided by the patient.     Review of patient's allergies indicates:  No Known Allergies  Past Medical History:   Diagnosis Date    Bipolar disorder, unspecified     Schizophrenia, unspecified      No past surgical history on file.  No family history on file.  Social History     Tobacco Use    Smoking status: Former     Types: Cigarettes    Smokeless tobacco: Never   Substance Use Topics    Alcohol use: Not Currently    Drug use: Not Currently     Review of Systems   Constitutional:  Negative for fever.   HENT:  Negative for rhinorrhea.    Respiratory:  Negative for cough and shortness of breath.    Cardiovascular:  Negative for chest pain.   Gastrointestinal:  Negative for abdominal pain, constipation, diarrhea, nausea and vomiting.   Genitourinary:  Negative for dysuria.   Musculoskeletal:  Negative for back pain and neck pain.   Skin:  Negative for rash.   Neurological:  Negative for weakness, numbness and headaches.   Psychiatric/Behavioral:  Positive for hallucinations and sleep disturbance. Negative for self-injury and suicidal ideas.         Homicidal ideation       Physical Exam     Initial Vitals   BP Pulse Resp Temp SpO2   03/28/24  0158 03/28/24 0158 03/28/24 0158 03/28/24 0158 03/28/24 0159   (!) 144/93 (!) 55 18 97.7 °F (36.5 °C) 98 %      MAP       --                Physical Exam    Nursing note and vitals reviewed.  Constitutional: He appears well-developed and well-nourished. He is not diaphoretic. No distress.   HENT:   Head: Normocephalic and atraumatic.   Eyes: Conjunctivae and EOM are normal.   Neck: Neck supple.   Normal range of motion.  Cardiovascular:  Normal rate and regular rhythm.           Pulmonary/Chest: Breath sounds normal. No respiratory distress. He has no wheezes. He has no rhonchi. He has no rales.   Abdominal: Abdomen is soft. Bowel sounds are normal. He exhibits no distension. There is no abdominal tenderness.   Musculoskeletal:         General: Normal range of motion.      Cervical back: Normal range of motion and neck supple.     Neurological: He is alert and oriented to person, place, and time. He has normal strength. GCS eye subscore is 4. GCS verbal subscore is 5. GCS motor subscore is 6.   Skin: Skin is warm and dry.   Psychiatric:   Homicidal ideation, auditory hallucinations, withdrawn, cooperative, insight and judgment impaired         ED Course   Procedures  Labs Reviewed   COMPREHENSIVE METABOLIC PANEL - Abnormal; Notable for the following components:       Result Value    Glucose Level 64 (*)     Globulin 3.6 (*)     Albumin/Globulin Ratio 1.0 (*)     All other components within normal limits   DRUG SCREEN, URINE (BEAKER) - Abnormal; Notable for the following components:    Amphetamines, Urine Positive (*)     Cocaine, Urine Positive (*)     All other components within normal limits    Narrative:     Cut off concentrations:    Amphetamines - 1000 ng/ml  Barbiturates - 200 ng/ml  Benzodiazepine - 200 ng/ml  Cannabinoids (THC) - 50 ng/ml  Cocaine - 300 ng/ml  Fentanyl - 1.0 ng/ml  MDMA - 500 ng/ml  Opiates - 300 ng/ml   Phencyclidine (PCP) - 25 ng/ml    Specimen submitted for drug analysis and tested for pH  and specific gravity in order to evaluate sample integrity. Suspect tampering if specific gravity is <1.003 and/or pH is not within the range of 4.5 - 8.0  False negatives may result form substances such as bleach added to urine.  False positives may result for the presence of a substance with similar chemical structure to the drug or its metabolite.    This test provides only a PRELIMINARY analytical test result. A more specific alternate chemical method must be used in order to obtain a confirmed analytical result. Gas chromatography/mass spectrometry (GC/MS) is the preferred confirmatory method. Other chemical confirmation methods are available. Clinical consideration and professional judgement should be applied to any drug of abuse test result, particularly when preliminary positive results are used.    Positive results will be confirmed only at the physicians request. Unconfirmed screening results are to be used only for medical purposes (treatment).        ACETAMINOPHEN LEVEL - Abnormal; Notable for the following components:    Acetaminophen Level <17.4 (*)     All other components within normal limits   CBC WITH DIFFERENTIAL - Abnormal; Notable for the following components:    RBC 4.47 (*)     Hgb 13.6 (*)     Hct 41.8 (*)     MCHC 32.5 (*)     MPV 12.1 (*)     All other components within normal limits   POCT GLUCOSE, HAND-HELD DEVICE - Abnormal; Notable for the following components:    POC Glucose 112 (*)     All other components within normal limits   POCT GLUCOSE - Abnormal; Notable for the following components:    POCT Glucose 112 (*)     All other components within normal limits   TSH - Normal   URINALYSIS, REFLEX TO URINE CULTURE - Normal   ALCOHOL,MEDICAL (ETHANOL) - Normal   SARS-COV-2 RNA AMPLIFICATION, QUAL - Normal    Narrative:     The IDNOW COVID-19 assay is a rapid molecular in vitro diagnostic test utilizing an isothermal nucleic acid amplification technology intended for the qualitative  "detection of nucleic acid from the SARS-CoV-2 viral RNA in direct nasal, nasopharyngeal or throat swabs from individuals who are suspected of COVID-19 by their healthcare provider.   CBC W/ AUTO DIFFERENTIAL    Narrative:     The following orders were created for panel order CBC auto differential.  Procedure                               Abnormality         Status                     ---------                               -----------         ------                     CBC with Differential[[id number]]       Abnormal            Final result                 Please view results for these tests on the individual orders.          Imaging Results    None          Medications - No data to display  Medical Decision Making  54-year-old male with history of schizophrenia, noncompliant with his medications, presenting for evaluation of hallucinations with homicidal ideation.  He is afebrile, hypertensive, reporting hallucinations currently cooperative.  He is withdrawn.  Exam otherwise benign.  Screening psychiatric labs have been ordered and PEC placed.  Awaiting medical clearance for psychiatric placement.    Differential diagnosis includes but is not limited to medication noncompliance, illicit drug use, withdrawal, thyroid dysfunction, electrolyte abnormality and others.    Amount and/or Complexity of Data Reviewed  Labs: ordered. Decision-making details documented in ED Course.               ED Course as of 03/28/24 0557   Thu Mar 28, 2024   0335 Labs only remarkable for low sugar.  Patient has eaten in the ED, repeat glucose within normal limits at 1:12 a.m..  He is medically clear for transfer to a psychiatric facility for further evaluation [RB]      ED Course User Index  [RB] Carrie Patel MD                     /88   Pulse 64   Temp 97.9 °F (36.6 °C)   Resp 18   Ht 6' 3" (1.905 m)   Wt 83.9 kg (185 lb)   SpO2 98%   BMI 23.12 kg/m²         Clinical Impression:  Final diagnoses:  [F23] Acute psychosis " (Primary)  [F20.9] Schizophrenia, unspecified type  [R45.850] Homicidal ideation          ED Disposition Condition    Transfer to Another Facility Stable                Carrie Patel MD  03/28/24 0335       Carrie Patel MD  03/28/24 0523

## 2024-03-28 NOTE — ED NOTES
Arrives c/o auditory hallucinations telling him to harm other people - denies plan. Hx of schizophrenia. Denies si. Pec at 0215 by dr. Patel [id number]

## 2024-03-28 NOTE — H&P
[facility]  Psychiatry  History & Physical    Patient Name: Kevin Dukes  MRN: [id number]   Code Status: Full Code  Admission Date: 3/28/2024  Attending Physician: Vega Engel MD   Primary Care Provider: Maureen, Primary Doctor    Current Legal Status:  Physician's emergency commitment    Patient information was obtained from patient and ER records.     Subjective:     Principal Problem:Schizoaffective disorder, bipolar type    Chief Complaint:  I just keep hearing these voices need to kill myself     HPI: 54-year-old  male who at this time presents with evidence of psychosis.  Patient this time states he has been hearing increased response to internal stimuli persistent over the past 7 years and episodically does resolve with medications.  Patient was admits to a period of which he was not been adherent to medications and most recently has decompensated once again with increased distortions reality testing but also comorbid history of which he has been abusing psychoactive substances.    Patient was this time states he began to hear voices 7 years ago.  He describes overall symptom complexes characterized by the followin. Command hallucinations to harm self or harm others.  2. Marked mood instability with the doors phoria  3.  Ever present paranoia   4. Increased degrees of isolation withdrawal  5. Sleep disturbance difficulty getting asleep and staying asleep   6. Decreased interest was pleasurable activities   7. Feelings of being anxious overwhelmed.    8. History of weight loss of greater than 10% of body weight as a result of mood substance use problems.      Patient was has a long established history of substance use going back into adolescence.  He reports his 1st drug of use was cannabis at age 20.  He states rapidly he progressed to cocaine thereafter.  Patient was most recently has been using cocaine as well as methamphetamines.  He states he  does smoke both.  He also has a history of previous use of opiate compounds post injury while playing basketball.  He states he did get hooked on multiple agents orally at the time.  His drug of choice however shifted to the psychostimulants.    Patient does not report any significant difficulties in terms of alcohol use.    Patient was denies any history of physical or emotional trauma or sexual trauma in his youth.    He denies any history of assaultive aggressive behavior towards others.    Patient does admit to previous patterns of thoughts to self injure but denies any recent attempts.      Patient describes having upwards of 26 previous inpatient hospitalizations over the past 7 years.  He states his last hospitalization was some 8 to 9 months ago.  He states he will follow up with [facility]  however after a couple of visits he usually stops going.      Patient was reports he was a family history difficulties in terms of mental health problems with his biological mother, biological at biological brother.  He was not exactly certain what overall history might be however.    Patient originally from [place] was relocated after Jess[place].  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  He was states he was last participated in your P in basketball back at the year 2002.  Patient was states his career in did with the ankle injury.    Patient was does have 2 years of college.         Patient History               Medical as of 3/28/2024       Past Medical History       Diagnosis Date Comments Source    Bipolar disorder, unspecified -- -- Provider    History of psychiatric hospitalization -- -- Provider    Hx of psychiatric care -- -- Provider    Arcelia -- -- Provider    Psychiatric problem -- -- Provider    Schizophrenia, unspecified -- -- Patient    Substance abuse -- -- Provider                          Surgical as of 3/28/2024    Past Surgical History: Patient  provided no pertinent surgical history.               Family as of 3/28/2024       Problem Relation Name Age of Onset Comments Source    Depression Mother -- -- -- Provider    Depression Brother -- -- -- Provider                  Tobacco Use as of 3/28/2024       Smoking Status Smoking Start Date Quit Date Current Packs/Day Average Packs/Day    Former -- -- --       Smokeless Status Smokeless Type Smokeless Quit Date    Never -- --      Source    Provider                  Alcohol Use as of 3/28/2024       Alcohol Use Drinks/Week Alcohol/Week Comments Source    Not Currently   -- -- Provider                  Drug Use as of 3/28/2024       Drug Use Types Frequency Comments Source    Yes  Cocaine, Methamphetamines -- -- Provider                  Sexual Activity as of 3/28/2024    None               Activities of Daily Living as of 3/28/2024    None               Social Documentation as of 3/28/2024    ** Merged History Encounter **   Source:                Occupational as of 3/28/2024    None               Socioeconomic as of 3/28/2024       Marital Status Spouse Name Number of Children Years Education Education Level Preferred Language Ethnicity Race Source    Single -- 4 -- -- English Not  or /a Black or  Provider                  Pertinent History       Question Response Comments    Lives with alone --    Place in Birth Order -- --    Lives in home --    Number of Siblings -- --    Raised by biological parents --    Legal Involvement none --    Childhood Trauma uneventful --    Criminal History of none --    Financial Status unemployed --    Highest Level of Education unfinished college --    Does patient have access to a firearm? No --     Service No --    Primary Leisure Activity -- --    Spirituality -- --          Past Medical History:   Diagnosis Date    Bipolar disorder, unspecified     History of psychiatric hospitalization     Hx of psychiatric care     Arcelia      Psychiatric problem     Schizophrenia, unspecified     Substance abuse      No past surgical history on file.  Family History       Problem Relation (Age of Onset)    Depression Mother, Brother          Tobacco Use    Smoking status: Former     Types: Cigarettes    Smokeless tobacco: Never   Substance and Sexual Activity    Alcohol use: Not Currently    Drug use: Yes     Types: Cocaine, Methamphetamines    Sexual activity: Not on file     Review of patient's allergies indicates:  No Known Allergies    No current facility-administered medications on file prior to encounter.     Current Outpatient Medications on File Prior to Encounter   Medication Sig    ARIPiprazole (ABILIFY) 5 MG Tab Take 1 tablet (5 mg total) by mouth once daily. (Patient not taking: Reported on 3/28/2024)    hydrOXYzine pamoate (VISTARIL) 25 MG Cap Take 1 capsule (25 mg total) by mouth every 6 (six) hours as needed (Insomnia). (Patient not taking: Reported on 3/28/2024)     Psychotherapeutics (From admission, onward)      Start     Stop Route Frequency Ordered    03/28/24 2100  OLANZapine tablet 10 mg         -- Oral Nightly 03/28/24 1537    03/28/24 1300  LORazepam tablet 1 mg         -- Oral 4 times daily 03/28/24 1206    03/28/24 1206  OLANZapine zydis disintegrating tablet 10 mg         -- Oral Every 8 hours PRN 03/28/24 1206          Review of Systems   Psychiatric/Behavioral:  Positive for decreased concentration, dysphoric mood, hallucinations and suicidal ideas. The patient is nervous/anxious.      Strengths and Liabilities:  Strengths:  Good intellect, motivated for change   weaknesses:  Poor adherence to medications, lack of strong family support network.    Objective:     Vital Signs (Most Recent):  Temp: 98 °F (36.7 °C) (03/28/24 1040)  Pulse: 68 (03/28/24 1652)  Resp: 18 (03/28/24 1652)  BP: (!) 136/92 (03/28/24 1652)  SpO2: 98 % (03/28/24 1652) Vital Signs (24h Range):  Temp:  [97.7 °F (36.5 °C)-98.7 °F (37.1 °C)] 98 °F (36.7  "°C)  Pulse:  [55-79] 68  Resp:  [18] 18  SpO2:  [98 %-100 %] 98 %  BP: (130-147)/(88-97) 136/92     Height: 6' 3" (190.5 cm)  Weight: 75.2 kg (165 lb 12.6 oz)  Body mass index is 20.72 kg/m².    No intake or output data in the 24 hours ending 03/28/24 1707       Physical Exam mental status examination:  54-year-old  male who is very thin for overall appearance.  He was significantly psychomotor restless.  His speech was with good articulation when produced his thought content demonstrated complaints of visual distortions as ego-dystonic and distressing to him.  He also reports ongoing challenges in terms of response to auditory hallucinations which he states were command in nature instructed to harm self or harm others there continued to be hypervigilance.  His insight and judgment deemed limited poor.      On cognitive evaluation patient was alert and oriented to person, place, setting and time.  There was heightened levels of psychomotor restlessness as if patient was continues to be still overly stimulated with methamphetamines.  His immediate memory is 3/3 objects immediately.  1/3 objects 5 minutes.  He was grossly inattentive.  His fund of knowledge is deemed to be average for his level education.  He was adequately abstract.        Significant Labs: Last 72 Hours:   Recent Lab Results  (Last 5 results in the past 72 hours)        03/28/24  0328   03/28/24  0328   03/28/24  0227   03/28/24  0224   03/28/24  0221        Phencyclidine         Negative       Albumin/Globulin Ratio     1.0           Acetaminophen Level     <17.4           Albumin     3.5           Alcohol, Serum     <10.0  Comment: This assay is performed for medical purposes only.           ALP     56           ALT     16           Amphetamines, Urine         Positive       Appearance, UA         Clear       AST     24           Bacteria, UA         None Seen       Barbituates, Urine         Negative       Baso #     0.06        "    Basophil %     0.8           Benzodiazepine, Urine         Negative       BILIRUBIN TOTAL     0.2           Bilirubin, UA         Negative       BUN     19.7           Calcium     9.0           Cannabinoids, Urine         Negative       Chloride     107           CO2     23           Cocaine, Urine         Positive       Color, UA         Colorless       ID NOW COVID-19, (MISTI)       Negative         Creatinine     1.16           eGFR     >60           Eos #     0.17           Eos %     2.4           Fentanyl, Urine         Negative       Globulin, Total     3.6           Glucose     64           Glucose, UA         Normal       Hematocrit     41.8           Hemoglobin     13.6           Immature Grans (Abs)     0.01           Immature Granulocytes     0.1           Ketones, UA         Negative       Leukocyte Esterase, UA         Negative       Lymph #     2.31           LYMPH %     32.5           MCH     30.4           MCHC     32.5           MCV     93.5           MDMA, Urine         Negative       Mono #     0.90           Mono %     12.7           MPV     12.1           Neut #     3.65           Neut %     51.5           NITRITE UA         Negative       nRBC     0.0           Blood, UA         Negative       Opiates, Urine         Negative       pH, UA         6.0       pH, Urine         6.0       Platelet Count     165           POC Glucose   112             POCT Glucose 112               Potassium     4.0           PROTEIN TOTAL     7.1           Protein, UA         Negative       RBC     4.47           RBC, UA         0-5       RDW     13.2           Sodium     139           Specific Gravity,UA         1.022       Specific Gravity, Urine Auto         1.022       Squamous Epithelial Cells, UA         Trace       TSH     1.864           Urobilinogen, UA         Normal       WBC, UA         None Seen       WBC     7.10                                  Significant Imaging: None  Assessment/Plan:     *  Schizoaffective disorder, bipolar type  54-year-old  male at this time struggling with ongoing difficulties in terms of paranoia or response to internal stimuli.  Patient was this time has a comorbid history of abuse of psychoactive chemicals that may be muddling of his current clinical picture.  Today he does appear to be a little bit restless, little bit mood stable, having persistent ongoing difficulties in terms of psychomotor restlessness has a functioning of his detoxification.    Patient was clearly need of additional psychopharmacologic individuals stabilization before going to a lower level of care.    Homicidal ideation  Patient was reporting command hallucinations such that this would be high-risk to discharge patient was community.  Patient was not achieved adequate control overall psychotic symptoms.    Patient nonadherence  Patient was suspected to have a chronic pattern nonadherence with medications.  Patient was benefit for referral to assertive Community treatment program versus referral to residential.  Certainly if he was ever return to home community patient was benefit tremendously from support my outpatient agencies to ensure and enhance his compliance.       Estimated Discharge Date: 4/5/2024  Initial Discharge Plan: Other:  We will encourage referral to residential substance treatment program at the time of discharge.  Patient was need the level of service to prevent ongoing recidivism.  Patient was very isolated in his home and in his community.      Prognosis: Fair    Need for Continued Hospitalization:   Psychiatric illness continues to pose a potential threat to life or bodily function, of self or others, thereby requiring the need for continued inpatient psychiatric hospitalization., Protective inpatient psychiatric hospitalization required while a safe disposition plan is enacted., and Requires ongoing hospitalization for stabilization of medications.    Total Time: 50  with greater than 50% of time spent in counseling and/or coordination of care.     Vega Engel MD   Psychiatry  [facility]
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
## 2024-03-29 LAB
CHOLEST SERPL-MCNC: 170 MG/DL
CHOLEST/HDLC SERPL: 3 {RATIO} (ref 0–5)
GLUCOSE P FAST SERPL-MCNC: 84 MG/DL (ref 70–100)
HDLC SERPL-MCNC: 66 MG/DL (ref 35–60)
LDLC SERPL CALC-MCNC: 92 MG/DL (ref 50–140)
T PALLIDUM AB SER QL: NONREACTIVE
TRIGL SERPL-MCNC: 60 MG/DL (ref 34–140)
VLDLC SERPL CALC-MCNC: 12 MG/DL

## 2024-03-29 PROCEDURE — 80061 LIPID PANEL: CPT | Performed by: PSYCHIATRY & NEUROLOGY

## 2024-03-29 PROCEDURE — 25000003 PHARM REV CODE 250: Performed by: PSYCHIATRY & NEUROLOGY

## 2024-03-29 PROCEDURE — 11400000 HC PSYCH PRIVATE ROOM

## 2024-03-29 PROCEDURE — 82947 ASSAY GLUCOSE BLOOD QUANT: CPT | Performed by: PSYCHIATRY & NEUROLOGY

## 2024-03-29 PROCEDURE — 86780 TREPONEMA PALLIDUM: CPT | Performed by: PSYCHIATRY & NEUROLOGY

## 2024-03-29 RX ADMIN — OLANZAPINE 10 MG: 10 TABLET, FILM COATED ORAL at 08:03

## 2024-03-29 RX ADMIN — LORAZEPAM 1 MG: 1 TABLET ORAL at 01:03

## 2024-03-29 RX ADMIN — LORAZEPAM 1 MG: 1 TABLET ORAL at 08:03

## 2024-03-29 NOTE — GROUP NOTE
Education Group      Group Focus: Offered group on discharge planning.       Number of patients in attendance: 4    Group Start Time: 1130  Group End Time:  1215  Groups Date: 3/29/2024  Group Topic:  Behavioral Health  Group Department: [facility]  Group Facilitators:  Daniela Driscoll LPN  _____________________________________________________________________    Patient Name: Kevin Dukes  MRN: [id number]  Patient Class: IP- Psych   Admission Date\Time: 3/28/2024 10:30 AM  Hospital Length of Stay: 1  Primary Care Provider: Maureen, Primary Doctor     Referred by: Behavioral Medicine Unit Treatment Team     Target symptoms:      Patient's response to treatment: did not attend     Progress toward goals:      Interval History:      Diagnosis:      Plan:

## 2024-03-29 NOTE — NURSING
"Daily Nursing Note:      Behavior:    Patient (Kevin Dukes is a 54 y.o. male, : 1970, MRN: [id number]) demonstrating an affect that was sad, flat, and anxious and aloof. Kevin demonstrating mood that is depressed and anxious. Kevin had an appearance that was disheveled. Kevin denies suicidal ideation. Kevin denies suicide plan. Kevin denies homicidal ideation. Kevin endorses  auditory hallucinations.    Kevin's  height is 6' 3" (1.905 m) and weight is 75.2 kg (165 lb 12.6 oz). His temperature is 97.6 °F (36.4 °C). His blood pressure is 156/98 (abnormal) and his pulse is 46 (abnormal). His respiration is 18 and oxygen saturation is 100%.     Blades last BM was noted on: 3/28/2024.      Intervention:    Encourage Kevin to perform self-hygiene, grooming, and changing of clothing. Monitor Kevin's behavior and program compliance. Monitor Kevin for suicidal ideation, homicidal ideation, sleep disturbance, and hallucinations. Encourage Kevin to eat all portions of meals and assess for meal preferences. Monitor Kevin for intake and output to ensure hydration. Notify the Physician or any medication refusal and any change in patient condition.      Response:    Kevin verbalizes understand of unit process and procedures. Kevin has been compliant with medications and reported no adverse effects.       Plan:     Continue to monitor per MD/PA/APRN orders; maintain patient safety. Q15min safety checks. Suicide precautions.  "

## 2024-03-29 NOTE — PLAN OF CARE
Problem: Behavior Regulation Impairment (Psychotic Signs/Symptoms)  Goal: Improved Behavioral Control (Psychotic Signs/Symptoms)  Outcome: Ongoing, Progressing  Intervention: Manage Behavior  Flowsheets (Taken 3/29/2024 0603)  De-Escalation Techniques:   verbally redirected   stimulation decreased   quiet time facilitated     Problem: Sensory Perception Impairment (Psychotic Signs/Symptoms)  Goal: Decreased Sensory Symptoms (Psychotic Signs/Symptoms)  Outcome: Ongoing, Progressing  Intervention: Minimize and Manage Sensory Impairment  Flowsheets (Taken 3/29/2024 0603)  Sensory Stimulation Regulation: quiet environment promoted     Problem: Sleep Disturbance (Psychotic Signs/Symptoms)  Goal: Improved Sleep (Psychotic Signs/Symptoms)  Outcome: Ongoing, Progressing     Problem: Behavior Regulation Impairment (Homicidal Behavior)  Goal: Improved Impulse and Aggression Control (Homicidal Behavior)  Outcome: Ongoing, Progressing  Intervention: Promote Impulse and Aggression Control  Flowsheets (Taken 3/29/2024 0603)  De-Escalation Techniques:   verbally redirected   stimulation decreased   quiet time facilitated  Behavior Management:   behavioral plan reviewed   boundaries reinforced     Problem: Homicidal Behavior  Goal: Resolved Homicidal Intention (Homicidal Behavior)  Outcome: Ongoing, Progressing  Intervention: Facilitate Resolution of Homicidal Intent  Flowsheets (Taken 3/29/2024 0603)  Trust Relationship/Rapport:   care explained   choices provided   emotional support provided   questions answered

## 2024-03-29 NOTE — PLAN OF CARE
Problem: Behavior Regulation Impairment (Psychotic Signs/Symptoms)  Goal: Improved Behavioral Control (Psychotic Signs/Symptoms)  Outcome: Ongoing, Progressing  Intervention: Manage Behavior  Flowsheets (Taken 3/29/2024 1423)  De-Escalation Techniques:   appropriate behavior reinforced   diversional activity encouraged   quiet time facilitated     Problem: Sensory Perception Impairment (Psychotic Signs/Symptoms)  Goal: Decreased Sensory Symptoms (Psychotic Signs/Symptoms)  Outcome: Ongoing, Progressing     Problem: Sleep Disturbance (Psychotic Signs/Symptoms)  Goal: Improved Sleep (Psychotic Signs/Symptoms)  Outcome: Ongoing, Progressing  Intervention: Promote Healthy Sleep Hygiene  Flowsheets (Taken 3/29/2024 1423)  Sleep Hygiene Promotion:   napping discouraged   regular sleep pattern promoted   relaxation techniques promoted   room lighting adjusted     Problem: Behavior Regulation Impairment (Homicidal Behavior)  Goal: Improved Impulse and Aggression Control (Homicidal Behavior)  Outcome: Ongoing, Progressing     Problem: Homicidal Behavior  Goal: Resolved Homicidal Intention (Homicidal Behavior)  Outcome: Ongoing, Progressing  Intervention: Facilitate Resolution of Homicidal Intent  Flowsheets (Taken 3/29/2024 1423)  Safe Transition Promotion: personal safety plan developed     Problem: Adult Behavioral Health Plan of Care  Goal: Plan of Care Review  Outcome: Ongoing, Progressing  Goal: Patient-Specific Goal (Individualization)  Outcome: Ongoing, Progressing  Goal: Adheres to Safety Considerations for Self and Others  Outcome: Ongoing, Progressing  Goal: Absence of New-Onset Illness or Injury  Outcome: Ongoing, Progressing  Intervention: Prevent Infection  Flowsheets (Taken 3/29/2024 1423)  Infection Prevention:   hand hygiene promoted   rest/sleep promoted  Goal: Optimized Coping Skills in Response to Life Stressors  Outcome: Ongoing, Progressing  Intervention: Promote Effective Coping Strategies  Flowsheets  (Taken 3/29/2024 1423)  Supportive Measures:   active listening utilized   counseling provided   decision-making supported   goal-setting facilitated   positive reinforcement provided   problem-solving facilitated   relaxation techniques promoted   self-care encouraged   self-reflection promoted   verbalization of feelings encouraged   self-responsibility promoted  Goal: Develops/Participates in Therapeutic [place] to Support Successful Transition  Outcome: Ongoing, Progressing  Intervention: Mutually Develop Transition Plan  Flowsheets (Taken 3/29/2024 1423)  Outpatient/Agency/Support Group Needs:   outpatient counseling   outpatient medication management   outpatient psychiatric care (specify)   outpatient substance abuse treatment (specify)  Concerns to be Addressed:   mental health   substance/tobacco abuse/use  Goal: Rounds/Family Conference  Outcome: Ongoing, Progressing

## 2024-03-29 NOTE — NURSING
"Daily Nursing Note:      Behavior:    Patient (Kevin Dukes is a 54 y.o. male, : 1970, MRN: [id number]) demonstrating an affect that was anxious, irritable, and  labile. Kevin demonstrating mood that is anxious. Kevin had an appearance that was clean. Kevin denies suicidal ideation. Kevin denies suicide plan. Kevin denies homicidal ideation. Kevin denies hallucinations. Kevin has denied hallucinations this evening.     Kevin's  height is 6' 3" (1.905 m) and weight is 75.2 kg (165 lb 12.6 oz). His temperature is 98.3 °F (36.8 °C). His blood pressure is 136/92 (abnormal) and his pulse is 68. His respiration is 18 and oxygen saturation is 98%.     Blades last BM was noted on: 2024.    Intervention:    Encourage Kevin to perform self-hygiene, grooming, and changing of clothing. Monitor Kevin's behavior and program compliance. Monitor Kevin for suicidal ideation, homicidal ideation, sleep disturbance, and hallucinations. Encourage Kevin to eat all portions of meals and assess for meal preferences. Monitor Kevin for intake and output to ensure hydration. Notify the Physician/Physician Assistant/Advance Practice Registered Nurse (MD/PA/APRN) for any medication refusal and any change in patient condition.      Response:    Kevin verbalizes understand of unit process and procedures. Kevin has been med compliant.    Plan:     Continue to monitor per MD Orders; maintain patient safety.     "

## 2024-03-29 NOTE — PROGRESS NOTES
3/29/2024 12:04 PM   Name: Kevin Dukes   : 1970   MRN: [id number]   [facility] Progress Note     SUBJECTIVE:   Pt seen and chart reviewed, received update from staff. Pt is new to me, received clinical update from staff and reviewed notes by Dr. Engel. Briefly, pt was admitted on PEC for increase psychosis in the setting of cocaine and mehtamphetamine abuse and medication nonadherence. For interview, pt presents guarded, not much spontaneous speech, affect constricted. He reports AH seem to be improving, less frequent and intense compared to when he arrived. Denies any CAH at this time. Reports medications seem to be helping so far, c/o mild sedation from meds but tolerable at this time. Pt educated that this should improve over the coming days as ativan is tapered off. He continues to exhibit evidence of underlying paranoia but does not volunteer any overtly delusional TC. Reports good sleep and appetite. Contemplative about residential rehab at this time.        Current Medications:   Scheduled Meds:    LORazepam  1 mg Oral QID    OLANZapine  10 mg Oral QHS      PRN Meds: acetaminophen, hydrOXYzine pamoate, ibuprofen, OLANZapine zydis     Allergies:   Review of patient's allergies indicates:  No Known Allergies     OBJECTIVE:   Vitals   Vitals:    24 0613   BP: (!) 156/98   Pulse: (!) 46   Resp: 18   Temp: 97.6 °F (36.4 °C)        Mental Status Exam:  Appearance: appropriate and fair hygiene/grooming  Sensorium: alert  Orientation: oriented to person, place, and time  Behavior/Cooperation: calm and guarded  Movements/Motor Activity: No tremor or involuntary movements observed. No psychomotor retardation or agitation  Speech: non-spontaneous, soft  Affect: constricted and anxious  Mood: anxious, depressed  Thought Process: linear and slowed  Associations: no loosening of associations  Thought Content: denies SI/HI/plan/intent and paranoia in evidence but not overtly delusional  Thought Perceptions:  +AH but decreasing in severity  Attention/Concentration: Impaired to some degree  Memory: recent and remote grossly intact  Insight: limited  Judgment: limited    Recent Results (from the past 48 hour(s))   Urinalysis, Reflex to Urine Culture    Collection Time: 03/28/24  2:21 AM    Specimen: Urine   Result Value Ref Range    Color, UA Colorless Yellow, Light-Yellow, Colorless, Straw, Dark-Yellow    Appearance, UA Clear Clear    Specific Gravity, UA 1.022 1.005 - 1.030    pH, UA 6.0 5.0 - 8.5    Protein, UA Negative Negative    Glucose, UA Normal Negative, Normal    Ketones, UA Negative Negative    Blood, UA Negative Negative    Bilirubin, UA Negative Negative    Urobilinogen, UA Normal 0.2, 1.0, Normal    Nitrites, UA Negative Negative    Leukocyte Esterase, UA Negative Negative    WBC, UA None Seen None Seen, 0-2, 3-5, 0-5 /HPF    Bacteria, UA None Seen None Seen, Trace /HPF    Squamous Epithelial Cells, UA Trace None Seen /HPF    RBC, UA 0-5 None Seen, 0-2, 3-5, 0-5 /HPF   Drug Screen, Urine    Collection Time: 03/28/24  2:21 AM   Result Value Ref Range    Amphetamines, Urine Positive (A) Negative    Barbituates, Urine Negative Negative    Benzodiazepine, Urine Negative Negative    Cannabinoids, Urine Negative Negative    Cocaine, Urine Positive (A) Negative    Fentanyl, Urine Negative Negative    MDMA, Urine Negative Negative    Opiates, Urine Negative Negative    Phencyclidine, Urine Negative Negative    pH, Urine 6.0 3.0 - 11.0    Specific Gravity, Urine Auto 1.022 1.001 - 1.035   COVID-19 Rapid Screening    Collection Time: 03/28/24  2:24 AM   Result Value Ref Range    SARS COV-2 MOLECULAR Negative Negative   Comprehensive metabolic panel    Collection Time: 03/28/24  2:27 AM   Result Value Ref Range    Sodium Level 139 136 - 145 mmol/L    Potassium Level 4.0 3.5 - 5.1 mmol/L    Chloride 107 98 - 107 mmol/L    Carbon Dioxide 23 22 - 29 mmol/L    Glucose Level 64 (L) 74 - 100 mg/dL    Blood Urea Nitrogen 19.7  8.4 - 25.7 mg/dL    Creatinine 1.16 0.73 - 1.18 mg/dL    Calcium Level Total 9.0 8.4 - 10.2 mg/dL    Protein Total 7.1 6.4 - 8.3 gm/dL    Albumin Level 3.5 3.5 - 5.0 g/dL    Globulin 3.6 (H) 2.4 - 3.5 gm/dL    Albumin/Globulin Ratio 1.0 (L) 1.1 - 2.0 ratio    Bilirubin Total 0.2 <=1.5 mg/dL    Alkaline Phosphatase 56 40 - 150 unit/L    Alanine Aminotransferase 16 0 - 55 unit/L    Aspartate Aminotransferase 24 5 - 34 unit/L    eGFR >60 mls/min/1.73/m2   TSH    Collection Time: 03/28/24  2:27 AM   Result Value Ref Range    TSH 1.864 0.350 - 4.940 uIU/mL   Ethanol    Collection Time: 03/28/24  2:27 AM   Result Value Ref Range    Ethanol Level <10.0 <=10.0 mg/dL   Acetaminophen level    Collection Time: 03/28/24  2:27 AM   Result Value Ref Range    Acetaminophen Level <17.4 (L) 17.4 - 30.0 ug/ml   CBC with Differential    Collection Time: 03/28/24  2:27 AM   Result Value Ref Range    WBC 7.10 4.50 - 11.50 x10(3)/mcL    RBC 4.47 (L) 4.70 - 6.10 x10(6)/mcL    Hgb 13.6 (L) 14.0 - 18.0 g/dL    Hct 41.8 (L) 42.0 - 52.0 %    MCV 93.5 80.0 - 94.0 fL    MCH 30.4 27.0 - 31.0 pg    MCHC 32.5 (L) 33.0 - 36.0 g/dL    RDW 13.2 11.5 - 17.0 %    Platelet 165 130 - 400 x10(3)/mcL    MPV 12.1 (H) 7.4 - 10.4 fL    Neut % 51.5 %    Lymph % 32.5 %    Mono % 12.7 %    Eos % 2.4 %    Basophil % 0.8 %    Lymph # 2.31 0.6 - 4.6 x10(3)/mcL    Neut # 3.65 2.1 - 9.2 x10(3)/mcL    Mono # 0.90 0.1 - 1.3 x10(3)/mcL    Eos # 0.17 0 - 0.9 x10(3)/mcL    Baso # 0.06 <=0.2 x10(3)/mcL    IG# 0.01 0 - 0.04 x10(3)/mcL    IG% 0.1 %    NRBC% 0.0 %   POCT Glucose, Hand-Held Device    Collection Time: 03/28/24  3:28 AM   Result Value Ref Range    POC Glucose 112 (A) 70 - 110 MG/DL   POCT glucose    Collection Time: 03/28/24  3:28 AM   Result Value Ref Range    POCT Glucose 112 (H) 70 - 110 mg/dL   Glucose, Fasting    Collection Time: 03/29/24  6:10 AM   Result Value Ref Range    Glucose Fasting 84 70 - 100 mg/dL   Lipid panel    Collection Time: 03/29/24  6:10  "AM   Result Value Ref Range    Cholesterol Total 170 <=200 mg/dL    HDL Cholesterol 66 (H) 35 - 60 mg/dL    Triglyceride 60 34 - 140 mg/dL    Cholesterol/HDL Ratio 3 0 - 5    Very Low Density Lipoprotein 12     LDL Cholesterol 92.00 50.00 - 140.00 mg/dL      No results found for: "PHENYTOIN", "PHENOBARB", "VALPROATE", "CBMZ"      ASSESSMENT/PLAN:   Diagnosis:  Active Hospital Problems    Diagnosis  POA    *Schizoaffective disorder, bipolar type [F25.0]  Yes    Suicide ideation [R45.851]  Not Applicable    Polysubstance abuse (amphetamines, cocaine) [F19.10] 03/28/2024 Yes       Plan:  - Cont Zyprexa 10mg PO qhs for psychosis  - Will cut back on ativan from 1mg QID to --> 1mg PO TID to mitigate psychostimulant withdrawal, anticipate continuing to taper down over the weekend  - CTM and provide support  - Encouraged residential rehab    Expected Disposition Plan: possibly rehab if pt will agree      Carlos Deras MD  Psychiatry - [facility]  03/29/2024 12:11 PM    "

## 2024-03-29 NOTE — PROGRESS NOTES
Recreation Therapy Progress Note    Date:  03/29/2024    Time:  1400    Group Title:  Leisure skills    Mood:  Depressed withdrawn isolated    Behavior:  Prompts needed to participate in group    Affect:  Patient depressed and anxious and low energy    Speech:  Prompts needed to talk in group session    Cognition:  Alert but distracted with low energy and depressed mood    Participation Level:  Patient attended recreational group with prompts from staff.  This is a improvement for him to come to group today    Intervention:  Continue RT services          Recreation Therapist   Signature:  Dorys Graff MA CTRS

## 2024-03-29 NOTE — PROGRESS NOTES
Recreation Therapy Progress Note    Date:  03/29/2024    Time:  10:00 a.m.    Group Title:  Creative expression    Mood:  Patient flat depressed and withdrawn    Behavior:  Patient refused group time to patient stated was not feeling well.  Patient wanted to sleep    Affect:  Flat low energy and depressed as well as anxious and irritable    Speech:  Low volume and talking very little    Cognition:  Patient was alert when talked to but distracted with not feeling well and wanting to sleep    Participation Level:  Patient refused group x2    Intervention:  Continue to motivate patient to attend group in to do work to his ability.  Assist patient one-to-one as needed to complete his goals and enhance quality of life          Recreation Therapist   Signature:  Dorys Graff MA CTRS

## 2024-03-30 PROCEDURE — 11400000 HC PSYCH PRIVATE ROOM

## 2024-03-30 PROCEDURE — 25000003 PHARM REV CODE 250: Performed by: PSYCHIATRY & NEUROLOGY

## 2024-03-30 RX ORDER — LORAZEPAM 0.5 MG/1
1 TABLET ORAL 3 TIMES DAILY
Status: DISCONTINUED | OUTPATIENT
Start: 2024-03-30 | End: 2024-04-01

## 2024-03-30 RX ADMIN — LORAZEPAM 1 MG: 1 TABLET ORAL at 08:03

## 2024-03-30 RX ADMIN — OLANZAPINE 10 MG: 10 TABLET, FILM COATED ORAL at 08:03

## 2024-03-30 RX ADMIN — LORAZEPAM 1 MG: 1 TABLET ORAL at 02:03

## 2024-03-30 NOTE — NURSING
"Daily Nursing Note:      Behavior:    Patient (Kevin Dukes is a 54 y.o. male, : 1970, MRN: [id number]) demonstrating an affect that was sad, flat, and anxious. Kevin demonstrating mood that is depressed and anxious. Kevin had an appearance that was disheveled. Kevin denies suicidal ideation. Kevin denies suicide plan. Kevin denies homicidal ideation. Kevin endorses hallucinations.    Kevin's  height is 6' 3" (1.905 m) and weight is 75.2 kg (165 lb 12.6 oz). His temperature is 98 °F (36.7 °C). His blood pressure is 142/97 (abnormal) and his pulse is 62. His respiration is 16 and oxygen saturation is 97%.     Blades last BM was noted on: 3/29/24_______      Intervention:    Encourage Kevin to perform self-hygiene, grooming, and changing of clothing. Monitor Kevin's behavior and program compliance. Monitor Kevin for suicidal ideation, homicidal ideation, sleep disturbance, and hallucinations. Encourage Kevin to eat all portions of meals and assess for meal preferences. Monitor Kevin for intake and output to ensure hydration. Notify the Physician/Physician Assistant/Advance Practice Registered Nurse (MD/PA/APRN) for any medication refusal and any change in patient condition.      Response:    Kevin verbalizes understand of unit process and procedures.     Plan:     Continue to monitor per MD/PA/APRN orders; maintain patient safety.  " Dr Johnston, Please review and co-sign. INR today: 1.6 (Goal: 2.0-3.0) Warfarin dosing change: dose boost 10/30  Reason: INR low, patient missed few doses, not sure of exact dose missed nor exact days  Recheck INR 11/6/23

## 2024-03-30 NOTE — PLAN OF CARE
Problem: Behavior Regulation Impairment (Psychotic Signs/Symptoms)  Goal: Improved Behavioral Control (Psychotic Signs/Symptoms)  Outcome: Ongoing, Progressing  Intervention: Manage Behavior  Flowsheets (Taken 3/30/2024 1229)  De-Escalation Techniques:   appropriate behavior reinforced   diversional activity encouraged   quiet time facilitated     Problem: Sensory Perception Impairment (Psychotic Signs/Symptoms)  Goal: Decreased Sensory Symptoms (Psychotic Signs/Symptoms)  Outcome: Ongoing, Progressing  Intervention: Minimize and Manage Sensory Impairment  Flowsheets (Taken 3/30/2024 1229)  Sensory Stimulation Regulation:   auditory stimulation minimized   visual stimulation minimized   quiet environment promoted     Problem: Sleep Disturbance (Psychotic Signs/Symptoms)  Goal: Improved Sleep (Psychotic Signs/Symptoms)  Outcome: Ongoing, Progressing  Intervention: Promote Healthy Sleep Hygiene  Flowsheets (Taken 3/30/2024 1229)  Sleep Hygiene Promotion:   awakenings minimized   napping discouraged   regular sleep pattern promoted   relaxation techniques promoted     Problem: Behavior Regulation Impairment (Homicidal Behavior)  Goal: Improved Impulse and Aggression Control (Homicidal Behavior)  Outcome: Ongoing, Progressing  Intervention: Promote Impulse and Aggression Control  Flowsheets (Taken 3/30/2024 1229)  De-Escalation Techniques:   appropriate behavior reinforced   diversional activity encouraged   quiet time facilitated  Behavior Management:   behavioral plan reviewed   boundaries reinforced   impulse control promoted     Problem: Homicidal Behavior  Goal: Resolved Homicidal Intention (Homicidal Behavior)  Outcome: Ongoing, Progressing  Intervention: Facilitate Resolution of Homicidal Intent  Flowsheets (Taken 3/30/2024 1229)  Safe Transition Promotion: personal safety plan developed     Problem: Adult Behavioral Health Plan of Care  Goal: Plan of Care Review  Outcome: Ongoing, Progressing  Goal:  Patient-Specific Goal (Individualization)  Outcome: Ongoing, Progressing  Goal: Adheres to Safety Considerations for Self and Others  Outcome: Ongoing, Progressing  Intervention: Develop and Maintain Individualized Safety Plan  Flowsheets (Taken 3/30/2024 1229)  Safety Measures:   environmental rounds completed   safety rounds completed  Goal: Absence of New-Onset Illness or Injury  Outcome: Ongoing, Progressing  Intervention: Identify and Manage Fall Risk  Flowsheets (Taken 3/30/2024 1229)  Safety Measures:   environmental rounds completed   safety rounds completed  Intervention: Prevent Infection  Flowsheets (Taken 3/30/2024 1229)  Infection Prevention:   hand hygiene promoted   rest/sleep promoted  Goal: Optimized Coping Skills in Response to Life Stressors  Outcome: Ongoing, Progressing  Goal: Develops/Participates in Therapeutic [place] to Support Successful Transition  Outcome: Ongoing, Progressing  Intervention: Mutually Develop Transition Plan  Flowsheets (Taken 3/30/2024 1229)  Current Discharge Risk:   psychiatric illness   substance use/abuse  Outpatient/Agency/Support Group Needs:   outpatient medication management   outpatient psychiatric care (specify)   outpatient substance abuse treatment (specify)   outpatient counseling  Anticipated Discharge Disposition: home or self-care  Patient/Family Anticipates Transition to: home  Transportation Anticipated: health plan transportation  Concerns to be Addressed:   mental health   substance/tobacco abuse/use  Goal: Rounds/Family Conference  Outcome: Ongoing, Progressing

## 2024-03-30 NOTE — NURSING
"Daily Nursing Note:      Behavior:    Patient (Kevin Dukes is a 54 y.o. male, : 1970, MRN: [id number]) demonstrating an affect that was flat. Kevin demonstrating mood that is depressed and anxious. Kevin had an appearance that was disheveled. Kevin denies suicidal ideation. Kevin denies suicide plan. Kevin denies homicidal ideation. Kevin denies hallucinations.    Kevin's  height is 6' 3" (1.905 m) and weight is 75.2 kg (165 lb 12.6 oz). His temperature is 98.3 °F (36.8 °C). His blood pressure is 137/92 (abnormal) and his pulse is 62. His respiration is 20 and oxygen saturation is 98%.     Blades last BM was noted on: 3/29/2024.      Intervention:    Encourage Kevin to perform self-hygiene, grooming, and changing of clothing. Monitor Kevin's behavior and program compliance. Monitor Kevin for suicidal ideation, homicidal ideation, sleep disturbance, and hallucinations. Encourage Kevin to eat all portions of meals and assess for meal preferences. Monitor Kevin for intake and output to ensure hydration. Notify the Physician  for any medication refusal and any change in patient condition.      Response:    Kevin verbalizes understand of unit process and procedures. Kevin has been compliant with medications.      Plan:     Continue to monitor per MD/PA/APRN orders; maintain patient safety.Q15min safety checks. Suicide precautions.  "

## 2024-03-30 NOTE — GROUP NOTE
Education Group      Group Focus: Offered group on unit safety and rules.       Number of patients in attendance: 8    Group Start Time: 1015  Group End Time:  1100  Groups Date: 3/30/2024  Group Topic:  Behavioral Health  Group Department: [facility] - Behavioral Health Unit  Group Facilitators:  Daniela Driscoll LPN  _____________________________________________________________________    Patient Name: Kevin Dukes  MRN: [id number]  Patient Class: IP- Psych   Admission Date\Time: 3/28/2024 10:30 AM  Hospital Length of Stay: 2  Primary Care Provider: Maureen, Primary Doctor     Referred by: Behavioral Medicine Unit Treatment Team     Target symptoms:      Patient's response to treatment: did not attend     Progress toward goals:      Interval History:      Diagnosis:      Plan: Continue treatment on BMU

## 2024-03-30 NOTE — GROUP NOTE
Group Psychotherapy       Group Focus: Spirituality and Well-Being      Number of patients in attendance: 2    Group Start Time: 0900  Group End Time:  0945  Groups Date: 3/30/2024  Group Topic:  Behavioral Health  Group Department: [facility] - Behavioral Health Unit  Group Facilitators:  Kerrie Kirby RN  _____________________________________________________________________    Patient Name: Kevin Dukes  MRN: [id number]  Patient Class: IP- Psych   Admission Date\Time: 3/28/2024 10:30 AM  Hospital Length of Stay: 2  Primary Care Provider: Maureen, Primary Doctor     Referred by: Behavioral Medicine Unit Treatment Team     Target symptoms: Psychosis and Poor Coping Skills     Patient's response to treatment: Not Participating     Progress toward goals: Not progressing     Interval History: Kevin was present for some of the group but did not participate     Diagnosis: Schizoaffective disorder, bipolar type     Plan: Continue treatment on BMU

## 2024-03-30 NOTE — PROGRESS NOTES
GÓMEZ # 2    54-year-old  male who is currently on hospital day 2.  Patient was seen via telemedicine platform today.  Provider was in [facility].  Patient was was in [facility].    Kevin this time reports diminishing intensity overall response to internal stimuli he reports that this time that his overall intensity of which he was had difficulty in terms of paranoia has a appear to be somewhat lessened.  He reports that this time no major concerns regards to initiation of trials of Zyprexa Ativan.      Utilization of benzodiazepines appeared has been useful in terms of mitigate any agitation with he was withdrawal from psychostimulants.      On mental status examination:  54-year-old  male who at this time cooperates this time does show bit more range affect today.  Whose speech was with fairly good articulation and execution.  His thought processes were more spontaneous and organized.  His thought content demonstrated no clear evidence of any visual hallucinations.  He did not acknowledge any auditory hallucinations.  He was making no active statements to harm self.  He made no active statements this time to harm others.  There continued to be some paranoia but it was far lessened in far less intense for patient.  His insight and judgment deemed to be limited.      Impression:    Homicidal ideation  Suicide ideation  Schizoaffective disorder bipolar type   Stimulant use disorder   Nonadherence    Indications:  Patient was time has a appeared to tolerate institutional trials of olanzapine and we will continue with downward titration of Ativan as needed.  Patient was this time has not achieved full maximum benefit and continues to warrant ongoing management     Estimated continued stay 72-96 hours     Recommendations:  1.  We will cut doses of Ativan 1 mg p.o. t.i.d.  2. Continue trials of olanzapine 10 mg p.o. q.h.s.  3. We will encourage patient was consider going  forward to residential level of care for substance treatment to facilitate ongoing stabilization recovery.  4. We will re-evaluate over the next 48 hours length of the decisions in terms of treatment moving forward.

## 2024-03-31 PROCEDURE — 25000003 PHARM REV CODE 250: Performed by: PSYCHIATRY & NEUROLOGY

## 2024-03-31 PROCEDURE — 11400000 HC PSYCH PRIVATE ROOM

## 2024-03-31 RX ADMIN — LORAZEPAM 1 MG: 1 TABLET ORAL at 03:03

## 2024-03-31 RX ADMIN — OLANZAPINE 10 MG: 10 TABLET, FILM COATED ORAL at 08:03

## 2024-03-31 RX ADMIN — LORAZEPAM 1 MG: 1 TABLET ORAL at 08:03

## 2024-03-31 NOTE — NURSING
"Daily Nursing Note:      Behavior:    Patient (Kevin Dukes is a 54 y.o. male, : 1970, MRN: [id number]) demonstrating an affect that was blunted.... Kevin demonstrating mood that is depressed and anxious. Kevin had an appearance that was disheveled. Kevin denies suicidal ideation. Kevin denies suicide plan. Kevin denies homicidal ideation. Kevin denies hallucinations.    Kevin's  height is 6' 3" (1.905 m) and weight is 80.1 kg (176 lb 9.4 oz). His temperature is 98 °F (36.7 °C). His blood pressure is 129/86 and his pulse is 65. His respiration is 20 and oxygen saturation is 99%.     Blades last BM was noted on: 3/30/2024. More present on unit. Attended group and went outdoors with some group members.       Intervention:    Encourage Kevin to perform self-hygiene, grooming, and changing of clothing. Monitor Kevin's behavior and program compliance. Monitor Kevin for suicidal ideation, homicidal ideation, sleep disturbance, and hallucinations. Encourage Kevin to eat all portions of meals and assess for meal preferences. Monitor Kevin for intake and output to ensure hydration. Notify the Physician for any medication refusal and any change in patient condition.      Response:    Kevin verbalizes understand of unit process and procedures. Kevin has been compliant with medications.    Plan:     Continue to monitor per MD orders; maintain patient safety. Q15min safety checks.    "

## 2024-03-31 NOTE — PLAN OF CARE
Problem: Behavior Regulation Impairment (Psychotic Signs/Symptoms)  Goal: Improved Behavioral Control (Psychotic Signs/Symptoms)  Outcome: Ongoing, Progressing  Intervention: Manage Behavior  Flowsheets (Taken 3/31/2024 1011)  De-Escalation Techniques:   appropriate behavior reinforced   diversional activity encouraged   quiet time facilitated     Problem: Sleep Disturbance (Psychotic Signs/Symptoms)  Goal: Improved Sleep (Psychotic Signs/Symptoms)  Outcome: Ongoing, Progressing  Intervention: Promote Healthy Sleep Hygiene  Flowsheets (Taken 3/31/2024 1011)  Sleep Hygiene Promotion:   napping discouraged   regular sleep pattern promoted   relaxation techniques promoted   room lighting adjusted     Problem: Adult Behavioral Health Plan of Care  Goal: Plan of Care Review  Outcome: Ongoing, Progressing  Goal: Patient-Specific Goal (Individualization)  Outcome: Ongoing, Progressing  Goal: Adheres to Safety Considerations for Self and Others  Outcome: Ongoing, Progressing  Goal: Absence of New-Onset Illness or Injury  Outcome: Ongoing, Progressing  Intervention: Prevent Infection  Flowsheets (Taken 3/31/2024 1011)  Infection Prevention:   hand hygiene promoted   rest/sleep promoted  Goal: Optimized Coping Skills in Response to Life Stressors  Outcome: Ongoing, Progressing  Goal: Develops/Participates in Therapeutic [place] to Support Successful Transition  Outcome: Ongoing, Progressing  Intervention: Mutually Develop Transition Plan  Flowsheets (Taken 3/31/2024 1011)  Current Discharge Risk:   psychiatric illness   substance use/abuse  Outpatient/Agency/Support Group Needs:   outpatient medication management   outpatient counseling   outpatient psychiatric care (specify)   outpatient substance abuse treatment (specify)  Anticipated Discharge Disposition: home with family  Patient/Family Anticipated Services at Transition:   mental health services   outpatient care  Patient/Family Anticipates Transition to:  home  Concerns to be Addressed:   mental health   physical/sexual safety  Goal: Rounds/Family Conference  Outcome: Ongoing, Progressing     Problem: Sensory Perception Impairment (Psychotic Signs/Symptoms)  Goal: Decreased Sensory Symptoms (Psychotic Signs/Symptoms)  Outcome: Met     Problem: Homicidal Behavior  Goal: Resolved Homicidal Intention (Homicidal Behavior)  Outcome: Met

## 2024-03-31 NOTE — GROUP NOTE
Group Psychotherapy       Group Focus: Relationship Dynamics      Number of patients in attendance: 6    Group Start Time: 1400  Group End Time:  1445  Groups Date: 3/31/2024  Group Topic:  Behavioral Health  Group Department: [facility]  Group Facilitators:  Kerrie Kirby RN  _____________________________________________________________________    Patient Name: Kevin Dukes  MRN: [id number]  Patient Class: IP- Psych   Admission Date\Time: 3/28/2024 10:30 AM  Hospital Length of Stay: 3  Primary Care Provider: Maureen Primary Doctor     Referred by: Behavioral Medicine Unit Treatment Team     Target symptoms: Psychosis     Patient's response to treatment: Active Listening     Progress toward goals: Progressing adequately     Interval History: Attended and participated in group     Diagnosis: Schizoaffective disorder, bipolar type     Plan: Continue treatment on BMU

## 2024-03-31 NOTE — GROUP NOTE
Education Group       Group Focus: Offered group To increase interaction with peers.       Number of patients in attendance: 6    Group Start Time: 0945  Group End Time:  1030  Groups Date: 3/31/2024  Group Topic:  Behavioral Health  Group Department: [facility]  Group Facilitators:  Daniela Driscoll LPN  _____________________________________________________________________    Patient Name: Kevin Dukes  MRN: [id number]  Patient Class: IP- Psych   Admission Date\Time: 3/28/2024 10:30 AM  Hospital Length of Stay: 3  Primary Care Provider: Maureen Primary Doctor     Referred by: Behavioral Medicine Unit Treatment Team     Target symptoms: Mood Disorder     Patient's response to treatment: Active Listening, Self-disclosure, Frequent Questions, and Feedback given to another patient     Progress toward goals: Progressing well     Interval History: Good interaction with peers.     Diagnosis: schizoaffective     Plan: Continue treatment on BMU

## 2024-03-31 NOTE — NURSING
"Daily Nursing Note:      Behavior:    Patient (Kevin Dukes is a 54 y.o. male, : 1970, MRN: [id number]) demonstrating an affect that was flat. Kevin demonstrating mood that is depressed and anxious. Kevin had an appearance that was disheveled. Kevin denies suicidal ideation. Kevin denies suicide plan. Kevin denies homicidal ideation. Kevin denies hallucinations.    Kevin's  height is 6' 3" (1.905 m) and weight is 75.2 kg (165 lb 12.6 oz). His temperature is 98.4 °F (36.9 °C). His blood pressure is 154/95 (abnormal) and his pulse is 69. His respiration is 18 and oxygen saturation is 99%.     Blades last BM was noted on: 3/30/24_______      Intervention:    Encourage Kevin to perform self-hygiene, grooming, and changing of clothing. Monitor Kevin's behavior and program compliance. Monitor Kevin for suicidal ideation, homicidal ideation, sleep disturbance, and hallucinations. Encourage Kevin to eat all portions of meals and assess for meal preferences. Monitor Kevin for intake and output to ensure hydration. Notify the Physician/Physician Assistant/Advance Practice Registered Nurse (MD/PA/APRN) for any medication refusal and any change in patient condition.      Response:    Kevin verbalizes understand of unit process and procedures. Kevin is compliant with meds.      Plan:     Continue to monitor per MD/PA/APRN orders; maintain patient safety.  "

## 2024-04-01 PROBLEM — F14.20 COCAINE USE DISORDER, SEVERE, DEPENDENCE: Status: ACTIVE | Noted: 2024-04-01

## 2024-04-01 PROBLEM — F25.0 SCHIZOAFFECTIVE DISORDER, BIPOLAR TYPE: Status: RESOLVED | Noted: 2024-03-28 | Resolved: 2024-04-01

## 2024-04-01 PROBLEM — F33.3 SEVERE RECURRENT MAJOR DEPRESSION WITH PSYCHOTIC FEATURES: Status: ACTIVE | Noted: 2024-04-01

## 2024-04-01 PROCEDURE — 11400000 HC PSYCH PRIVATE ROOM

## 2024-04-01 PROCEDURE — 25000003 PHARM REV CODE 250: Performed by: PSYCHIATRY & NEUROLOGY

## 2024-04-01 RX ORDER — BUPROPION HYDROCHLORIDE 150 MG/1
150 TABLET ORAL DAILY
Status: DISCONTINUED | OUTPATIENT
Start: 2024-04-02 | End: 2024-04-03 | Stop reason: HOSPADM

## 2024-04-01 RX ORDER — LORAZEPAM 0.5 MG/1
0.5 TABLET ORAL 3 TIMES DAILY
Status: DISCONTINUED | OUTPATIENT
Start: 2024-04-01 | End: 2024-04-02

## 2024-04-01 RX ORDER — BUPROPION HYDROCHLORIDE 150 MG/1
150 TABLET ORAL DAILY
Status: DISCONTINUED | OUTPATIENT
Start: 2024-04-01 | End: 2024-04-01

## 2024-04-01 RX ADMIN — OLANZAPINE 10 MG: 10 TABLET, FILM COATED ORAL at 08:04

## 2024-04-01 RX ADMIN — LORAZEPAM 0.5 MG: 0.5 TABLET ORAL at 03:04

## 2024-04-01 RX ADMIN — LORAZEPAM 1 MG: 1 TABLET ORAL at 08:04

## 2024-04-01 RX ADMIN — LORAZEPAM 0.5 MG: 0.5 TABLET ORAL at 08:04

## 2024-04-01 NOTE — PROGRESS NOTES
Recreation Therapy Progress Note    Date:  04/01/2024    Time:  10:00 a.m.    Group Title:  Creative expression    Mood:  Low energy depressed and wants to sleep    Behavior:  Patient quiet withdrawn and guarded.  Patient in bed patient refused group.  Patient states he needs to sleep    Affect:  Low energy withdrawn and guarded    Speech:  Low volume.  Patient talks only with moderate prompts to interact in simple conversation    Cognition:  Alert when talked to but distracted and wanted to sleep    Participation Level:  0% patient refused group.  RT staff tried to get patient to attend group patient refused.  Patient was irritable and wanted to stay sleeping    Intervention:.  Continue RT services continue to motivate patient to attend group in use his strengths and abilities to complete treatment plan goals for RT services.  Assist patient one-to-one as needed .          Recreation Therapist   Signature:  Dorys Graff MA CTRS

## 2024-04-01 NOTE — PROGRESS NOTES
Hospital day 4.     54-year-old  male who at this time appears to be a little bit restless during the session.  He was speech was mildly pressured.  At this time we will interview process he does show evidence of which he continues complain of persistent paranoia but appears to be somewhat less intense in terms of presence duration.      Marla this time he was intensity in which he was ongoing thoughts to harm others as a result of auditory hallucinations has diminishing intensity but still remains present.    He was sleeping.  Staff reported that he was isolated to his room for bit of the time.    Patient was this time still is ambivalent about making decision about participation in any substance treatment program post discharge.    On mental status examination:  34-year-old  male whose affect at this time was constricted.  Whose thoughts at this time were produced spontaneously.  His thought content demonstrated no evidence of any visual hallucinations.  He continues to complain of auditory hallucinations episodically command in nature that tell him to harm others.  There continued to be paranoid process but certainly appeared to be more encapsulated.  His insight and judgment this time deemed to be limited to poor.      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 Advance doses of Zyprexa well with the next 24 hours as patient continues show persistent response to internal stimuli   2. We will rechallenge  patient was trials of Wellbutrin for targeting symptoms of mood and we will monitor patient was activation  3.  We will staff tomorrow with  has to planning for final disposition.  Feels strongly inpatient needs mentally IOP level of care versus referral to residential substance treatment program.

## 2024-04-01 NOTE — NURSING
"Daily Nursing Note:      Behavior:    Patient (Kevin Dukes is a 54 y.o. male, : 1970, MRN: [id number]) demonstrating an affect that was sad, flat, and anxious. Kevin demonstrating mood that is depressed and anxious. Kevin had an appearance that was disheveled. Kevin denies suicidal ideation. Kevin denies suicide plan. Kevin denies homicidal ideation. Kevin denies hallucinations with this nurse, but did admit to doctor that he continues to struggle with voices telling him to harm others.     Kevin's  height is 6' 3" (1.905 m) and weight is 80.1 kg (176 lb 9.4 oz). His temperature is 98 °F (36.7 °C). His blood pressure is 156/89 (abnormal) and his pulse is 61. His respiration is 20 and oxygen saturation is 97%.     Blades last BM was noted on: 3/31/24      Intervention:    Encourage Kevin to perform self-hygiene, grooming, and changing of clothing. Monitor Kevin's behavior and program compliance. Monitor Kevin for suicidal ideation, homicidal ideation, sleep disturbance, and hallucinations. Encourage Kevin to eat all portions of meals and assess for meal preferences. Monitor Kevin for intake and output to ensure hydration. Notify the Physician for any medication refusal and any change in patient condition.      Response:    Kevin verbalizes understand of unit process and procedures. Kevin is compliant with medications, no adverse effects observed or reported.      Plan:     Continue to monitor per MD orders; maintain patient safety. Q 15 min safety checks.  "

## 2024-04-01 NOTE — PLAN OF CARE
Problem: Sleep Disturbance (Psychotic Signs/Symptoms)  Goal: Improved Sleep (Psychotic Signs/Symptoms)  Outcome: Ongoing, Progressing  Intervention: Promote Healthy Sleep Hygiene  Flowsheets (Taken 4/1/2024 1746)  Sleep Hygiene Promotion:   napping discouraged   regular sleep pattern promoted     Problem: Behavior Regulation Impairment (Homicidal Behavior)  Goal: Improved Impulse and Aggression Control (Homicidal Behavior)  Outcome: Ongoing, Progressing  Intervention: Promote Impulse and Aggression Control  Flowsheets (Taken 4/1/2024 1746)  Behavior Management: behavioral plan reviewed     Problem: Adult Behavioral Health Plan of Care  Goal: Plan of Care Review  Flowsheets (Taken 4/1/2024 1746)  Plan of Care Reviewed With: patient  Patient Agreement with Plan of Care: agrees  Goal: Patient-Specific Goal (Individualization)  Flowsheets (Taken 4/1/2024 1746)  Patient Personal Strengths:   expressive of emotions   expressive of needs   independent living skills   intellectual cognitive skills   medication/treatment adherence  Patient Vulnerabilities:   history of unsuccessful treatment   substance abuse/addiction  Goal: Adheres to Safety Considerations for Self and Others  Outcome: Ongoing, Progressing  Goal: Optimized Coping Skills in Response to Life Stressors  Outcome: Ongoing, Progressing

## 2024-04-01 NOTE — NURSING
"Daily Nursing Note:      Behavior:    Patient (Kevin Dukes is a 54 y.o. male, : 1970, MRN: [id number]) demonstrating an affect that was blunted.... Kevin demonstrating mood that is depressed and anxious. Kevin had an appearance that was disheveled. Kevin denies suicidal ideation. Kevin denies suicide plan. Kevin denies homicidal ideation. Kevin denies hallucinations.    Kevin's  height is 6' 3" (1.905 m) and weight is 80.1 kg (176 lb 9.4 oz). His temperature is 98.1 °F (36.7 °C). His blood pressure is 153/96 (abnormal) and his pulse is 57 (abnormal). His respiration is 16 and oxygen saturation is 100%.       Intervention:    Encourage Kevin to perform self-hygiene, grooming, and changing of clothing. Monitor Kevin's behavior and program compliance. Monitor Kevin for suicidal ideation, homicidal ideation, sleep disturbance, and hallucinations. Encourage Kevin to eat all portions of meals and assess for meal preferences. Monitor Kevin for intake and output to ensure hydration. Notify the Physician/Physician Assistant/Advance Practice Registered Nurse (MD/PA/APRN) for any medication refusal and any change in patient condition.      Response:    Kevin verbalizes understand of unit process and procedures. Kevin is compliant with meds.      Plan:     Continue to monitor per MD/PA/APRN orders; maintain patient safety.  "

## 2024-04-01 NOTE — GROUP NOTE
Group Psychotherapy       Group Focus: Spirituality and Well-Being      Number of patients in attendance: 4    Group Start Time: 0900  Group End Time:  0945  Groups Date: 4/1/2024  Group Topic:  Behavioral Health  Group Department: [facility]  Group Facilitators:  Gabino Geller LCSW  _____________________________________________________________________    Patient Name: Kevin Dukes  MRN: [id number]  Patient Class: IP- Psych   Admission Date\Time: 3/28/2024 10:30 AM  Hospital Length of Stay: 4  Primary Care Provider: Maureen, Primary Doctor     Referred by: Behavioral Medicine Unit Treatment Team     Target symptoms: Substance Abuse     Patient's response to treatment: Not Participating     Progress toward goals: Progressing slowly     Interval History: Pt did not attend     Diagnosis:      Plan: Continue treatment on BMU

## 2024-04-01 NOTE — PSYCH EVALUATION
Behavioral Health Unit  Psychosocial History and Assessment  Progress Note      Patient Name: Kevin Dukes YOB: 1970 [facility] Date: 4/1/2024    Chief Complaint: addictive disorder and suicidal ideation    Consent:     Did the patient consent for an interview with the ? Yes    Did the patient consent for the  to contact family/friend/caregiver?   No    Did the patient give consent for the  to inform family/friend/caregiver of his/her whereabouts or to discuss discharge planning? No    Source of Information: Face to face with patient and Chart review    Is information obtained from interviews considered reliable?   yes    Reason for Admission:     Active Hospital Problems    Diagnosis  POA    *Schizoaffective disorder, bipolar type [F25.0]  Yes    Homicidal ideation [R45.850]  Not Applicable    Suicide ideation [R45.851]  Not Applicable    Patient nonadherence [Z91.199]  Not Applicable      Resolved Hospital Problems    Diagnosis Date Resolved POA    Polysubstance abuse [F19.10] 03/28/2024 Yes    Psychosis [F29] 03/28/2024 Yes       History of Present Illness - (Patient Perception):   Pt hearing voices telling him to hurt others.  States he has never acted on it.  Pt with a history of amphetamines and cocaine.  Pt with a history of multiple admits and rehabs.  States he does not want rehab at tis time..    History of Present Illness - (Perception of Others): years according to pateint    Present biopsychosocial functioning: low    Past biopsychosocial functioning: Pt has ahistory of higher function    Family and Marital/Relationship History:     Significant Other/Partner Relationships:  Single:      Family Relationships: Pt states he has 4 children.  No other family.      Childhood History:     Where was patient raised? [place]    Who raised the patient? States myself      How does patient describe their childhood? Typical, played  sports      Who is patient's primary support person? myself      Culture and Latter-day:     Latter-day: Taoism    How strong of a role does Latter day and spirituality play in patient's life? none    Islam or spiritual concerns regarding treatment:     History of Abuse:   History of Abuse: Denies      Outcome:     Psychiatric and Medical History:     History of psychiatric illness or treatment: prior inpatient treatment    Medical history:   Past Medical History:   Diagnosis Date    Bipolar disorder, unspecified     History of psychiatric hospitalization     Hx of psychiatric care     [place]     Psychiatric problem     Schizophrenia, unspecified     Substance abuse        Substance Abuse History:     Alcohol - (Patient Perspective):   Social History     Substance and Sexual Activity   Alcohol Use Not Currently       Alcohol - (Collateral Perspective): none according to pateint    Drugs - (Patient Perspective):   Social History     Substance and Sexual Activity   Drug Use Yes    Types: Cocaine, Methamphetamines       Drugs - (Collateral Perspective): meth and coke according to pateint    Additional Comments: pt states he uses sporadically    Education:     Currently Enrolled? No  Attended College/Technical School    Special Education? No    Interested in Completing Education/GED: No    Employment and Financial:     Currently employed? Unemployed     Source of Income: SSD    Able to afford basic needs (food, shelter, utilities)? Yes    Who manages finances/personal affairs? patient      Service:     ? no    Combat Service? No     Community Resources:     Describe present use of community resources: Pt states he uses [facility]    Identify previously used community resources   (Include previous mental health treatment - outpatient and inpatient): pt has ahistory of multiple hospitalizations    Environmental:     Current living situation:Lives alone    Social Evaluation:     Patient Assets: General fund of  knowledge, Supportive family/friends, Motivation for treatment/growth, and Capable of independent living    Patient Limitations: poor coping skills, drug use    High risk psychosocial issues that may impact discharge planning:   none    Recommendations:     Anticipated discharge plan:   outpatient follow up    High risk issues requiring early treatment planning and immediate intervention: none    Community resources needed for discharge planning:  aftercare treatment sources    Anticipated social work role(s) in treatment and discharge planning:  will offer advice and  as well as group therapy 4x per week and individual as necessary.   will assist with discharge planning and referrals to aftercare resources.

## 2024-04-01 NOTE — PROGRESS NOTES
Recreation Therapy Progress Note    Date:  04/01/2024    Time:  1400    Group Title:  Leisure skills    Mood:  Patient is withdrawn and quiet low energy    Behavior:  Patient needed prompts to attend group and participate at minimum level.    Affect:  Patient is withdrawn and quiet but used participate in more today in afternoon activity with motivation from peers and staff    Speech:  Patient is beginning to talk more in group with prompts from RT staff  Cognition:  Patient alert but needs prompts and one-to-one assistance to complete problem-solving activity to his best.  Patient gets anxious sad and restless fast . as a result needs one-to-one assistance to complete assignments his best    Participation Level:  Patient participated and completed art assignment at 100% with one-to-one assistance from staff to stay focused to make good choices to complete assignment    Intervention:  Continue RT services      Recreation Therapist   Signature:  Dorys Graff MA CTRS

## 2024-04-01 NOTE — GROUP NOTE
Nursing Group       Group Focus: Symptoms management and medication compliance      Number of patients in attendance: 9    Group Start Time: 1300  Group End Time:  1345  Groups Date: 4/1/2024  Group Topic:  Behavioral Health  Group Department: [facility]  Group Facilitators:  Heavenly Falcon RN  _____________________________________________________________________    Patient Name: Kevin Dukes  MRN: [id number]  Patient Class: IP- Psych   Admission Date\Time: 3/28/2024 10:30 AM  Hospital Length of Stay: 4  Primary Care Provider: Maureen Primary Doctor     Referred by: Behavioral Medicine Unit Treatment Team     Target symptoms: Substance Abuse, Depression, and Psychosis     Patient's response to treatment: Active Listening and Self-disclosure     Progress toward goals: Progressing adequately     Interval History: Attended and participated with fair response     Diagnosis: MDD with psychotic features     Plan: Continue treatment on BMU

## 2024-04-02 PROCEDURE — 11400000 HC PSYCH PRIVATE ROOM

## 2024-04-02 PROCEDURE — 25000003 PHARM REV CODE 250: Performed by: PSYCHIATRY & NEUROLOGY

## 2024-04-02 RX ORDER — NORTRIPTYLINE HYDROCHLORIDE 10 MG/1
10 CAPSULE ORAL 3 TIMES DAILY
Status: DISCONTINUED | OUTPATIENT
Start: 2024-04-02 | End: 2024-04-03 | Stop reason: HOSPADM

## 2024-04-02 RX ORDER — NORTRIPTYLINE HYDROCHLORIDE 10 MG/1
10 CAPSULE ORAL 3 TIMES DAILY
Qty: 90 CAPSULE | Refills: 1 | Status: SHIPPED | OUTPATIENT
Start: 2024-04-02 | End: 2024-06-01

## 2024-04-02 RX ORDER — BUPROPION HYDROCHLORIDE 150 MG/1
150 TABLET ORAL DAILY
Qty: 30 TABLET | Refills: 1 | Status: SHIPPED | OUTPATIENT
Start: 2024-04-03 | End: 2024-06-02

## 2024-04-02 RX ORDER — OLANZAPINE 10 MG/1
10 TABLET ORAL NIGHTLY
Qty: 30 TABLET | Refills: 1 | Status: SHIPPED | OUTPATIENT
Start: 2024-04-02 | End: 2024-06-01

## 2024-04-02 RX ADMIN — NORTRIPTYLINE HYDROCHLORIDE 10 MG: 10 CAPSULE ORAL at 08:04

## 2024-04-02 RX ADMIN — OLANZAPINE 10 MG: 10 TABLET, FILM COATED ORAL at 08:04

## 2024-04-02 RX ADMIN — LORAZEPAM 0.5 MG: 0.5 TABLET ORAL at 08:04

## 2024-04-02 RX ADMIN — NORTRIPTYLINE HYDROCHLORIDE 10 MG: 10 CAPSULE ORAL at 04:04

## 2024-04-02 RX ADMIN — BUPROPION HYDROCHLORIDE 150 MG: 150 TABLET, FILM COATED, EXTENDED RELEASE ORAL at 08:04

## 2024-04-02 NOTE — GROUP NOTE
Group Psychotherapy       Group Focus: Promoting Healthy Lifestyles      Number of patients in attendance: 3    Group Start Time: 0915  Group End Time:  0945  Groups Date: 4/2/2024  Group Topic:  Behavioral Health  Group Department: [facility]  Group Facilitators:  Gabino Geller LCSW  _____________________________________________________________________    Patient Name: Kevin Dukes  MRN: [id number]  Patient Class: IP- Psych   Admission Date\Time: 3/28/2024 10:30 AM  Hospital Length of Stay: 5  Primary Care Provider: Maureen, Primary Doctor     Referred by: Behavioral Medicine Unit Treatment Team     Target symptoms: Substance Abuse     Patient's response to treatment: Not Participating     Progress toward goals: Progressing slowly     Interval History: Pt did not attend group     Diagnosis:      Plan: Continue treatment on BMU

## 2024-04-02 NOTE — PROGRESS NOTES
GÓMEZ # 5     54-year-old  male who at this time has been fairly cooperative with the interview process today.  When questioned today whether he was responding to internal stimuli he denies.  When questioned today he was having any active threats or thoughts to harm others he denies.    Patient was appears to be tolerating current individuals medications including Wellbutrin fairly well.      Patient was reports diminishing his hallucinations reports he feels relatively comfortable with trials of Zyprexa.    His mood complaint today is ever present anxiety.    On mental status examination:  54-year-old  male whose affect at this time was blunted.  Whose speech at this time was with good articulation and execution when produced.  His thought content demonstrated no clear evidence of any visual hallucinations.  He denies that this time.  He was not show any overt paranoia at this time.  He does have a history of which she does suffer with periods of paranoia and lack of trust of others.  His insight and judgment this time were deemed to be limited.    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  Stimulant use disorder     Estimated continued stay 24-48 hours     Indications:  Patient was time need coordination of services outpatient care to address substance use disorder as well as maintain patient was appropriate trials of antidepressant therapy.  Patient was overall imminent risk to self injure appears to be gravely diminished.      Recommendations:  1. We will have patient on trials of nortriptyline 10 mg p.o. t.i.d. to try to mitigate difficulties in terms ever present anxiety  2.  We will maintain patient on trials of Zyprexa 10 mg p.o. q.h.s. for reported psychotic symptoms have decreased overall intensity  3. We will continue trials of Wellbutrin  mg p.o. q.day and we will re-evaluate.      With those complaints of increased degrees of  anxiety we will attempt with a pamelor trials.  Patient was educated at this time the importance of trying to steer away from use of benzodiazepines.  He was willing to accept these interpretations.

## 2024-04-02 NOTE — PROGRESS NOTES
Recreation Therapy Progress Note    Date:  04/02/2024    Time:  1400    Group Title:  Leisure skills    Mood:  Patient less depressed less anxious and brighter affect this afternoon and brighter mood    Behavior:  Patient participating in cognitive game.    Affect:  Patient less depressed less anxious and less withdrawn.  Patient affect is brighter    Speech:  Patient talking more with staff and peers with less prompts needed    Cognition:  Patient able to focused and cognitive game which is an improvement from yesterday    Participation Level:  100%    Intervention:  Continue RT services          Recreation Therapist   Signature:  Dorys Graff MA CTRS

## 2024-04-02 NOTE — PLAN OF CARE
Problem: Behavior Regulation Impairment (Psychotic Signs/Symptoms)  Goal: Improved Behavioral Control (Psychotic Signs/Symptoms)  Outcome: Ongoing, Progressing  Intervention: Manage Behavior  Flowsheets (Taken 4/1/2024 1920)  De-Escalation Techniques: quiet time facilitated     Problem: Sleep Disturbance (Psychotic Signs/Symptoms)  Goal: Improved Sleep (Psychotic Signs/Symptoms)  Outcome: Ongoing, Progressing     Problem: Behavior Regulation Impairment (Homicidal Behavior)  Goal: Improved Impulse and Aggression Control (Homicidal Behavior)  Outcome: Ongoing, Progressing  Intervention: Promote Impulse and Aggression Control  Flowsheets (Taken 4/2/2024 0406)  Behavior Management: impulse control promoted     Problem: Adult Behavioral Health Plan of Care  Goal: Plan of Care Review  Outcome: Ongoing, Progressing  Goal: Patient-Specific Goal (Individualization)  Outcome: Ongoing, Progressing  Goal: Adheres to Safety Considerations for Self and Others  Outcome: Ongoing, Progressing  Intervention: Develop and Maintain Individualized Safety Plan  Flowsheets (Taken 4/1/2024 1920)  Safety Measures: suicide assessment completed  Goal: Absence of New-Onset Illness or Injury  Outcome: Ongoing, Progressing  Goal: Optimized Coping Skills in Response to Life Stressors  Outcome: Ongoing, Progressing  Intervention: Promote Effective Coping Strategies  Flowsheets (Taken 4/1/2024 1920)  Supportive Measures:   active listening utilized   decision-making supported   self-responsibility promoted   positive reinforcement provided   verbalization of feelings encouraged   self-care encouraged   self-reflection promoted  Goal: Develops/Participates in Therapeutic [place] to Support Successful Transition  Outcome: Ongoing, Progressing  Goal: Rounds/Family Conference  Outcome: Ongoing, Progressing

## 2024-04-02 NOTE — PROGRESS NOTES
Recreation Therapy Progress Note    Date:  04/02/2024    Time:  2:00 a.m.    Group Title:  Creative expression    Mood:  Patient's mood is brighter less anxious and less agitated  Behavior:  Patient participate in more in group today.  Patient less guarded and interacting more    Affect:  Patient is less depressed and less anxious this morning    Speech:  Patient talking more with staff and peers    Cognition:  Patient alert and able to complete cognitive game    Participation Level:  100% in group which is a big improvement from yesterday    Intervention:  Continue RT services          Recreation Therapist   Signature:  Dorys Graff MA CTRS

## 2024-04-02 NOTE — NURSING
"Daily Nursing Note:      Behavior:    Patient (Kevin Dukes is a 54 y.o. male, : 1970, MRN: [id number]) demonstrating an affect that was sad, flat, and anxious. Kevin demonstrating mood that is depressed and anxious. Kevin had an appearance that was clean. Kevin denies suicidal ideation. Kevin denies suicide plan. Kevin denies homicidal ideation. Kevin endorses hallucinations, "voices are still there, but better". He is sleeping and eating well.     Kevin's  height is 6' 3" (1.905 m) and weight is 80.1 kg (176 lb 9.4 oz). His temperature is 97.7 °F (36.5 °C). His blood pressure is 133/95 (abnormal) and his pulse is 71. His respiration is 16 and oxygen saturation is 98%.     Blades last BM was noted on: 3/31/24      Intervention:    Encourage Kevin to perform self-hygiene, grooming, and changing of clothing. Monitor Kevin's behavior and program compliance. Monitor Kevin for suicidal ideation, homicidal ideation, sleep disturbance, and hallucinations. Encourage Kevin to eat all portions of meals and assess for meal preferences. Monitor Kevin for intake and output to ensure hydration. Notify the physician for any medication refusal and any change in patient condition.      Response:    Kevin verbalizes understand of unit process and procedures. Kevin is compliant with medications, no adverse effects observed or reported.      Plan:     Continue to monitor per MD orders; maintain patient safety.   "

## 2024-04-02 NOTE — GROUP NOTE
Nursing Group      Group Focus: Social interaction      Number of patients in attendance: 5    Group Start Time: 1500  Group End Time:  1545  Groups Date: 4/2/2024  Group Topic:  Behavioral Health  Group Department: [facility]  Group Facilitators:  Pauline Patel LPN  _____________________________________________________________________    Patient Name: Kevin Dukes  MRN: [id number]  Patient Class: IP- Psych   Admission Date\Time: 3/28/2024 10:30 AM  Hospital Length of Stay: 5  Primary Care Provider: Maureen, Primary Doctor     Referred by: Behavioral Medicine Unit Treatment Team     Target symptoms: Mood Disorder     Patient's response to treatment: Active Listening     Progress toward goals: Progressing well     Interval History: Participated with good interaction     Diagnosis: MDD with psychosis     Plan: Continue treatment on BMU

## 2024-04-02 NOTE — NURSING
"Daily Nursing Note:      Behavior:    Patient (Kevin Dukes is a 54 y.o. male, : 1970, MRN: [id number]) demonstrating an affect that was sad, anxious, and agitated. Kevin demonstrating mood that is depressed and anxious. Kevin had an appearance that was clean. Kevin denies suicidal ideation. Kevin denies suicide plan. Kevin denies homicidal ideation. Kevin denies hallucinations.    Kevin's  height is 6' 3" (1.905 m) and weight is 80.1 kg (176 lb 9.4 oz). His temperature is 98.1 °F (36.7 °C). His blood pressure is 138/90 (abnormal) and his pulse is 62. His respiration is 16 and oxygen saturation is 97%.     Blades last BM was noted on 2024.      Intervention:    Encouraged Kevin to perform self-hygiene, grooming, and changing of clothing. Monitored Kevin's behavior and program compliance. Monitored Kevin for suicidal ideation, homicidal ideation, sleep disturbance, and hallucinations. Encouraged Kevin to eat all portions of meals and assesse for meal preferences. Monitored Kevin for intake and output to ensure hydration. Will notify the Physician for any medication refusal and any change in patient condition.      Response:    Kevin nods in response to the understanding of unit process and procedures. Kevin has been compliant with medication therapy.  Speaks only when spoken to.  Sits with peers but does not engage much.        Plan:     Continue to monitor per MD orders; maintain patient safety with safety checks Q15 minutes and prn.   "

## 2024-04-02 NOTE — PLAN OF CARE
Problem: Behavior Regulation Impairment (Psychotic Signs/Symptoms)  Goal: Improved Behavioral Control (Psychotic Signs/Symptoms)  Outcome: Met     Problem: Sleep Disturbance (Psychotic Signs/Symptoms)  Goal: Improved Sleep (Psychotic Signs/Symptoms)  Outcome: Met     Problem: Behavior Regulation Impairment (Homicidal Behavior)  Goal: Improved Impulse and Aggression Control (Homicidal Behavior)  Outcome: Met     Problem: Adult Behavioral Health Plan of Care  Goal: Plan of Care Review  Outcome: Ongoing, Progressing  Goal: Optimized Coping Skills in Response to Life Stressors  Outcome: Ongoing, Progressing  Intervention: Promote Effective Coping Strategies  Flowsheets (Taken 4/2/2024 0167)  Supportive Measures:   active listening utilized   verbalization of feelings encouraged

## 2024-04-03 VITALS
HEART RATE: 74 BPM | HEIGHT: 75 IN | OXYGEN SATURATION: 97 % | WEIGHT: 176.56 LBS | BODY MASS INDEX: 21.95 KG/M2 | SYSTOLIC BLOOD PRESSURE: 140 MMHG | DIASTOLIC BLOOD PRESSURE: 96 MMHG | RESPIRATION RATE: 16 BRPM | TEMPERATURE: 98 F

## 2024-04-03 PROCEDURE — 25000003 PHARM REV CODE 250: Performed by: PSYCHIATRY & NEUROLOGY

## 2024-04-03 RX ADMIN — BUPROPION HYDROCHLORIDE 150 MG: 150 TABLET, FILM COATED, EXTENDED RELEASE ORAL at 08:04

## 2024-04-03 RX ADMIN — NORTRIPTYLINE HYDROCHLORIDE 10 MG: 10 CAPSULE ORAL at 08:04

## 2024-04-03 NOTE — PROGRESS NOTES
Discharge Note:    Kevin Dukes is a 54 y.o. male, : 1970, MRN: [id number], admitted on 3/28/2024 for Vega Engel MD with a diagnosis of Psychosis [F29].    Patient discharged on 4/3/2024 per physician orders in stable condition. Patient denied suicidal ideation, homicidal ideation, or hallucinations. Patient was discharged with valuables, personal belongings, prescriptions, discharge instructions, and an educational handout explaining the diagnosis and prescribed medications. Patient verbalized understanding of the discharge instructions and importance of follow-up visits. Patient was escorted out of the facility by [facility] and placed into a Medicaid transportation to be transported to home.     Patient discharged on the following medications:     Medication List        START taking these medications      buPROPion 150 MG TB24 tablet  Commonly known as: WELLBUTRIN XL  Take 1 tablet (150 mg total) by mouth once daily.     nortriptyline 10 MG capsule  Commonly known as: PAMELOR  Take 1 capsule (10 mg total) by mouth 3 (three) times daily.     OLANZapine 10 MG tablet  Commonly known as: ZyPREXA  Take 1 tablet (10 mg total) by mouth every evening.            STOP taking these medications      ARIPiprazole 5 MG Tab  Commonly known as: ABILIFY     hydrOXYzine pamoate 25 MG Cap  Commonly known as: VISTARIL               Where to Get Your Medications        These medications were sent to [id number] [address] AT [facility] & [address] [address]      Hours: 24-hours Phone: [phone]   buPROPion 150 MG TB24 tablet  nortriptyline 10 MG capsule  OLANZapine 10 MG tablet

## 2024-04-03 NOTE — GROUP NOTE
Group Psychotherapy       Group Focus: Promoting Healthy Lifestyles      Number of patients in attendance: 6    Group Start Time: 1915  Group End Time:  2015  Groups Date: 4/2/2024  Group Topic:  Behavioral Health  Group Department: [facility]  Group Facilitators:  Kelly Wilson RN  _____________________________________________________________________    Patient Name: Kevin Dukes  MRN: [id number]  Patient Class: IP- Psych   Admission Date\Time: 3/28/2024 10:30 AM  Hospital Length of Stay: 6  Primary Care Provider: Maureen Primary Doctor     Referred by: Behavioral Medicine Unit Treatment Team     Target symptoms: Depression and Anxiety     Patient's response to treatment: Active Listening     Progress toward goals: Progressing adequately     Interval History: Watched movie attentively     Diagnosis: Depression      Plan: Continue treatment on BMU

## 2024-04-03 NOTE — NURSING
"Daily Nursing Note:      Behavior:    Patient (Kevin Dukes is a 54 y.o. male, : 1970, MRN: [id number]) demonstrating an affect that was congruent. Kevin demonstrating mood that is normal and pleasant and appropriate. Kevin had an appearance that was clean. Kevin denies suicidal ideation. Kevin denies suicide plan. Kevin denies homicidal ideation. Kevin denies hallucinations.    Kevin's  height is 6' 3" (1.905 m) and weight is 80.1 kg (176 lb 9.4 oz). His temperature is 98.2 °F (36.8 °C). His blood pressure is 140/96 (abnormal) and his pulse is 74. His respiration is 16 and oxygen saturation is 97%.     Blades last BM was noted on: 2024      Intervention:    Encourage Kevin to perform self-hygiene, grooming, and changing of clothing. Monitor Kevin's behavior and program compliance. Monitor Kevin for suicidal ideation, homicidal ideation, sleep disturbance, and hallucinations. Encourage Kevin to eat all portions of meals and assess for meal preferences. Monitor Kevin for intake and output to ensure hydration. Notify the Physician for any medication refusal and any change in patient condition.      Response:    Kevin verbalizes understand of unit process and procedures. Kevin has been compliant with meds and reports no adverse effects.      Plan:     Continue to monitor per MD/PA/APRN orders; maintain patient safety. Q15min safety checks.  "

## 2024-04-03 NOTE — DISCHARGE SUMMARY
[facility]  Psychiatry  Discharge Summary      Patient Name: Kevin Dukes  MRN: [id number]  Admission Date: 3/28/2024  Hospital Length of Stay: 6 days  Discharge Date and Time: 4/3/2024  9:20 AM  Attending Physician: Maureen att. providers found   Discharging Provider: Vega Engel MD  Primary Care Provider: Maureen, Primary Doctor    HPI:   54-year-old  male who at this time presents with evidence of psychosis.  Patient this time states he has been hearing increased response to internal stimuli persistent over the past 7 years and episodically does resolve with medications.  Patient was admits to a period of which he was not been adherent to medications and most recently has decompensated once again with increased distortions reality testing but also comorbid history of which he has been abusing psychoactive substances.    Patient was this time states he began to hear voices 7 years ago.  He describes overall symptom complexes characterized by the followin. Command hallucinations to harm self or harm others.  2. Marked mood instability with the doors phoria  3.  Ever present paranoia   4. Increased degrees of isolation withdrawal  5. Sleep disturbance difficulty getting asleep and staying asleep   6. Decreased interest was pleasurable activities   7. Feelings of being anxious overwhelmed.    8. History of weight loss of greater than 10% of body weight as a result of mood substance use problems.      Patient was has a long established history of substance use going back into adolescence.  He reports his 1st drug of use was cannabis at age 20.  He states rapidly he progressed to cocaine thereafter.  Patient was most recently has been using cocaine as well as methamphetamines.  He states he does smoke both.  He also has a history of previous use of opiate compounds post injury while playing basketball.  He states he did get hooked on multiple agents orally at the  time.  His drug of choice however shifted to the psychostimulants.    Patient does not report any significant difficulties in terms of alcohol use.    Patient was denies any history of physical or emotional trauma or sexual trauma in his youth.    He denies any history of assaultive aggressive behavior towards others.    Patient does admit to previous patterns of thoughts to self injure but denies any recent attempts.      Patient describes having upwards of 26 previous inpatient hospitalizations over the past 7 years.  He states his last hospitalization was some 8 to 9 months ago.  He states he will follow up with [facility]  however after a couple of visits he usually stops going.      Patient was reports he was a family history difficulties in terms of mental health problems with his biological mother, biological at biological brother.  He was not exactly certain what overall history might be however.    Patient originally from [place] was relocated after [place].  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  He was states he was last participated in your P in basketball back at the year 2002.  Patient was states his career in did with the ankle injury.    Patient was does have 2 years of college.    Hospital Course:   Date of discharge:  04/03/2024.      54-year-old  male who at this time was admitted to inpatient setting with the overall complaints of increasing distortions perceptions and reported ongoing intrusive thoughts to harm others and harm self.    Patient was this time reports he has been nonadherence with medications for undetermined amount of time but also has begun to relapsed with crack cocaine.  Has a result he became increasingly despondent and hopeless and then had intensity in terms of overall hallucinations that began to worsen as well as his paranoia.    Patient was full physical assessment this time.  On physical evaluation  patient was found to have no gross abnormalities on physical assessment.  Review admission laboratories revealed no remarkable findings other than the presence of amphetamines and cocaine in his urine.  Patient was readily admitted to overall pattern which he has been using methamphetamines along with cocaine.      Patient was full psychiatric assessment this time.  Patient was identifies having ongoing thoughts to harm self, thoughts to harm others and complaints of persistent ongoing ever present difficulty with hallucinations and mood disturbance.    Patient was evaluated for trials of medications such was initiated on trials of Zyprexa.  Patient was placed on trials of Zyprexa and those doses were advanced upwards 10 mg p.o. q.h.s..  Patient was with a gentleman she was Zyprexa did begin to show some evidence of improvement in terms of intensity overall hallucinations well as reduction in degrees of paranoia.  Patient was started on trials of Wellbutrin for mood those doses were advanced upwards to Wellbutrin  mg p.o. q.day. patient was appeared to do nicely with this individual.  In combination with the overall management patient was persistent complaint overall difficulty with the anxiety prior to discharge.  Educated patient was then deemed that we had not be considering any possibility of use of benzodiazepines.  Recommendations made for trials of Pamelor 10 mg p.o. t.i.d..  Patient was surprisingly did accept recommendations for this treatment course.      Patient was counseled about overall need to abstain from illicit chemicals in the importance of going forward to some form of residential treatment versus IOP level of care.  He was not willing to make a commitment to go to residential.  Stressed to him the importance of utilization of the  has a resource outpatient.  Encouraged him to walk to the [facility] where he could him meetings daily.    Patient was verbalize he was willing  to do so.  Patient was also recommended at this time to engage into [facility] level of care for substance treatment and mental health follow-up.  Patient was remained confined to the inpatient setting for duration of 6 days.  At the end of 6 days it is quite clear that patient was maximized benefit, was not an imminent risk to self injure could safely be discharged to outpatient care.      Patient had no psychiatric complications during his stay were any medical complications during her stay.  Patient was discharged to home in stable condition he was not suicidal, homicidal nor did he show any evidence of any active significant distortions perceptions.  He was not gravely disabled.     Goals of Care Treatment Preferences:  Code Status: Full Code      * No surgery found *     Consults:   Physical Exam    Mental status examination:  54-year-old  male with a much broader range affect.  Whose speech was good articulation and execution.  His thought processes this time were linear and able to be tracked.  His thought content demonstrated no clear evidence of any auditory hallucinations.  There was no any visual hallucinations.  He made no statements to harm self.  He made no statements to harm others.  There was no evidence of delusions or distortions reality testing at this time.  Orientation was intact.  He had not show tremor dyskinetic movement.     Significant Labs: Last 72 Hours:   Recent Lab Results       None            Significant Imaging: None    Smoking Cessation:   Does the patient smoke? No  Does the patient want a prescription for Smoking Cessation? No  Does the patient want counseling for Smoking Cessation? No         Pending Diagnostic Studies:       None          Final Active Diagnoses:    Diagnosis Date Noted POA    PRINCIPAL PROBLEM:  Severe recurrent major depression with psychotic features [F33.3] 04/01/2024 Yes    Homicidal ideation [R45.850] 03/28/2024 Not Applicable    Suicide ideation  [R45.851] 03/28/2024 Not Applicable    Cocaine use disorder, severe, dependence [F14.20] 04/01/2024 Yes    Patient nonadherence [Z91.199] 03/28/2024 Not Applicable      Problems Resolved During this Admission:    Diagnosis Date Noted Date Resolved POA    Schizoaffective disorder, bipolar type [F25.0] 03/28/2024 04/01/2024 Yes    Polysubstance abuse [F19.10] 03/28/2024 03/28/2024 Yes    Psychosis [F29] 01/19/2023 03/28/2024 Yes      No new Assessment & Plan notes have been filed under this hospital service since the last note was generated.  Service: Psychiatry      Functional Condition: Independent ambulation, Can read/write, Able to access transportation , Needs assistance with transportation, and Independent with ADLs and basic life skills    Discharged Condition: fair    Disposition: Home or Self Care    Follow Up:   Follow-up Information       Center, [facility] Follow up.    Specialties: Behavioral Health, Psychiatry, Psychology  Contact information:  [address] [place] [address] [address] Remi SESAY [address] [phone]                           Patient Instructions:   No discharge procedures on file.  Medications:  Reconciled Home Medications:      Medication List        START taking these medications      buPROPion 150 MG TB24 tablet  Commonly known as: WELLBUTRIN XL  Take 1 tablet (150 mg total) by mouth once daily.     nortriptyline 10 MG capsule  Commonly known as: PAMELOR  Take 1 capsule (10 mg total) by mouth 3 (three) times daily.     OLANZapine 10 MG tablet  Commonly known as: ZyPREXA  Take 1 tablet (10 mg total) by mouth every evening.            STOP taking these medications      ARIPiprazole 5 MG Tab  Commonly known as: ABILIFY     hydrOXYzine pamoate 25 MG Cap  Commonly known as: VISTARIL            Is patient being discharged on multiple antipsychotics? No        Total time:30 with greater than 50% of this time spent in counseling and/or coordination of care.     All elements of the transition record were  discussed with the patient at discharge and patient agrees to this plan.    Vega Engel MD  Psychiatry  [facility]

## 2024-04-03 NOTE — HOSPITAL COURSE
Date of discharge:  04/03/2024.      54-year-old  male who at this time was admitted to inpatient setting with the overall complaints of increasing distortions perceptions and reported ongoing intrusive thoughts to harm others and harm self.    Patient was this time reports he has been nonadherence with medications for undetermined amount of time but also has begun to relapsed with crack cocaine.  Has a result he became increasingly despondent and hopeless and then had intensity in terms of overall hallucinations that began to worsen as well as his paranoia.    Patient was full physical assessment this time.  On physical evaluation patient was found to have no gross abnormalities on physical assessment.  Review admission laboratories revealed no remarkable findings other than the presence of amphetamines and cocaine in his urine.  Patient was readily admitted to overall pattern which he has been using methamphetamines along with cocaine.      Patient was full psychiatric assessment this time.  Patient was identifies having ongoing thoughts to harm self, thoughts to harm others and complaints of persistent ongoing ever present difficulty with hallucinations and mood disturbance.    Patient was evaluated for trials of medications such was initiated on trials of Zyprexa.  Patient was placed on trials of Zyprexa and those doses were advanced upwards 10 mg p.o. q.h.s..  Patient was with a gentleman she was Zyprexa did begin to show some evidence of improvement in terms of intensity overall hallucinations well as reduction in degrees of paranoia.  Patient was started on trials of Wellbutrin for mood those doses were advanced upwards to Wellbutrin  mg p.o. q.day. patient was appeared to do nicely with this individual.  In combination with the overall management patient was persistent complaint overall difficulty with the anxiety prior to discharge.  Educated patient was then deemed that we had not be  considering any possibility of use of benzodiazepines.  Recommendations made for trials of Pamelor 10 mg p.o. t.i.d..  Patient was surprisingly did accept recommendations for this treatment course.      Patient was counseled about overall need to abstain from illicit chemicals in the importance of going forward to some form of residential treatment versus IOP level of care.  He was not willing to make a commitment to go to residential.  Stressed to him the importance of utilization of the  has a resource outpatient.  Encouraged him to [facility] to the [facility] where he could him meetings daily.    Patient was verbalize he was willing to do so.  Patient was also recommended at this time to engage into IOP level of care for substance treatment and mental health follow-up.  Patient was remained confined to the inpatient setting for duration of 6 days.  At the end of 6 days it is quite clear that patient was maximized benefit, was not an imminent risk to self injure could safely be discharged to outpatient care.      Patient had no psychiatric complications during his stay were any medical complications during her stay.  Patient was discharged to home in stable condition he was not suicidal, homicidal nor did he show any evidence of any active significant distortions perceptions.  He was not gravely disabled.

## 2024-04-07 ENCOUNTER — HOSPITAL ENCOUNTER (EMERGENCY)
Facility: HOSPITAL | Age: 54
Discharge: HOME | End: 2024-04-07
Attending: EMERGENCY MEDICINE
Payer: MEDICAID

## 2024-04-07 VITALS
WEIGHT: 185 LBS | HEIGHT: 75 IN | DIASTOLIC BLOOD PRESSURE: 91 MMHG | SYSTOLIC BLOOD PRESSURE: 143 MMHG | RESPIRATION RATE: 19 BRPM | HEART RATE: 83 BPM | OXYGEN SATURATION: 100 % | BODY MASS INDEX: 23 KG/M2 | TEMPERATURE: 99 F

## 2024-04-07 DIAGNOSIS — M54.9 RIGHT-SIDED BACK PAIN, UNSPECIFIED BACK LOCATION, UNSPECIFIED CHRONICITY: Primary | ICD-10-CM

## 2024-04-07 PROCEDURE — 99284 EMERGENCY DEPT VISIT MOD MDM: CPT | Mod: 25

## 2024-04-07 RX ORDER — HYDROCODONE BITARTRATE AND ACETAMINOPHEN 7.5; 325 MG/1; MG/1
1 TABLET ORAL EVERY 6 HOURS PRN
Qty: 16 TABLET | Refills: 0 | Status: SHIPPED | OUTPATIENT
Start: 2024-04-07 | End: 2024-04-11

## 2024-04-07 RX ORDER — CYCLOBENZAPRINE HCL 10 MG
10 TABLET ORAL 3 TIMES DAILY PRN
Qty: 15 TABLET | Refills: 0 | Status: SHIPPED | OUTPATIENT
Start: 2024-04-07 | End: 2024-04-12

## 2024-04-08 NOTE — ED PROVIDER NOTES
Encounter Date: 4/7/2024       History     Chief Complaint   Patient presents with    Back Pain     Pt arrives c/o low back pain x 2 yrs, worse over past few days. Denies dysuria, BLE weakness/paresthesias. Did not take any meds for pain PTA. Hx schizo, bipolar, substance abuse.      See MDM    The history is provided by the patient. No  was used.     Review of patient's allergies indicates:  No Known Allergies  Past Medical History:   Diagnosis Date    Bipolar disorder, unspecified     History of psychiatric hospitalization     Hx of psychiatric care     [facility]     Psychiatric problem     Schizophrenia, unspecified     Substance abuse      History reviewed. No pertinent surgical history.  Family History   Problem Relation Age of Onset    Depression Mother     Depression Brother      Social History     Tobacco Use    Smoking status: Former     Types: Cigarettes    Smokeless tobacco: Never   Substance Use Topics    Alcohol use: Not Currently    Drug use: Yes     Types: Cocaine, Methamphetamines     Review of Systems   Constitutional:  Negative for fever.   Respiratory:  Negative for cough and shortness of breath.    Cardiovascular:  Negative for chest pain.   Gastrointestinal:  Negative for abdominal pain.   Genitourinary:  Negative for difficulty urinating and dysuria.   Musculoskeletal:  Positive for back pain. Negative for gait problem.   Skin:  Negative for color change.   Neurological:  Negative for dizziness, speech difficulty and headaches.   Psychiatric/Behavioral:  Negative for hallucinations and suicidal ideas.    All other systems reviewed and are negative.      Physical Exam     Initial Vitals [04/07/24 2040]   BP Pulse Resp Temp SpO2   (!) 166/101 67 18 98.6 °F (37 °C) 97 %      MAP       --         Physical Exam    Nursing note and vitals reviewed.  Constitutional: He appears well-developed and well-nourished.   HENT:   Head: Normocephalic.   Eyes: EOM are normal.   Neck: Neck supple.    Normal range of motion.  Cardiovascular:  Normal rate, regular rhythm, normal heart sounds and intact distal pulses.           Pulmonary/Chest: Breath sounds normal.   Abdominal: Abdomen is soft. Bowel sounds are normal.   Musculoskeletal:         General: Normal range of motion.      Cervical back: Normal range of motion and neck supple.      Comments: Tenderness to the right lower back.  No saddle paresthesia, no vertebral point tenderness, no bowel or bladder incontinence, and no neurological deficits     Neurological: He is alert and oriented to person, place, and time. He has normal strength.   Skin: Skin is warm and dry. Capillary refill takes less than 2 seconds.   Psychiatric: He has a normal mood and affect. His behavior is normal. Judgment and thought content normal.         ED Course   Procedures  Labs Reviewed - No data to display       Imaging Results              X-Ray Lumbar Spine Ap And Lateral (Final result)  Result time 04/07/24 21:12:54      Final result by Eliazar Schultz MD (04/07/24 21:12:54)                   Impression:      Mild spondylosis at L5-S1.      Electronically signed by: Eliazar Schultz  Date:    04/07/2024  Time:    21:12               Narrative:    EXAMINATION:  None available XR LUMBAR SPINE AP AND LATERAL    CLINICAL HISTORY:  low back pain;    TECHNIQUE:  Two-view    FINDINGS:  Lumbar vertebrae stature and alignment is preserved. Intervertebral disc spaces are without significant degenerative changes.  There is some facet arthropathy at L5-S1.  No acute fracture or malalignment identified                                       Medications - No data to display  Medical Decision Making  Historian:  Patient.  Patient is a 54-year-old male  that presents with back pain that has been present a few days. Associated symptoms nothing. Surrounding information is patient has had back pain exacerbation for over 2 years. Exacerbated by certain movements. Relieved by nothing. Patient  treatment prior to arrival nothing. Risk factors include none. Other history pertaining to this complaint nothing.   Assessment:  See physical exam.  DD:  Low back pain, chronic disc disease, spondylosis  ED Course: History was obtained.  Physical was performed.  X-ray shows spondylosis.  We will put the patient on Norco for pain.  He appears to have a muscle spasm on the right side of the lower back I will use Flexeril. Medical or surgical consults:  None. Social determinants that affect healthcare:  None.       Amount and/or Complexity of Data Reviewed  Radiology:      Details: X-ray did show spondylosis L5-S1    Risk  Prescription drug management.  Risk Details: Norco and Flexeril                                      Clinical Impression:  Final diagnoses:  [M54.9] Right-sided back pain, unspecified back location, unspecified chronicity - Acute on chronic (Primary)          ED Disposition Condition    Discharge Stable          ED Prescriptions       Medication Sig Dispense Start Date End Date Auth. Provider    HYDROcodone-acetaminophen (NORCO) 7.5-325 mg per tablet Take 1 tablet by mouth every 6 (six) hours as needed for Pain. 16 tablet 4/7/2024 4/11/2024 Bimal Castro FNP    cyclobenzaprine (FLEXERIL) 10 MG tablet Take 1 tablet (10 mg total) by mouth 3 (three) times daily as needed for Muscle spasms. 15 tablet 4/7/2024 4/12/2024 Bimal Castro FNP          Follow-up Information       Follow up With Specialties Details Why Contact Info    Your Primary Care Provider  Call in 3 days               Bimal Castro FNP  04/07/24 3367

## 2024-04-08 NOTE — FIRST PROVIDER EVALUATION
Medical screening examination initiated.  I have conducted a focused provider triage encounter, findings are as follows:    No chief complaint on file.    Brief history of present illness:  54 y.o. male presents to the ED with lower back pain onset 2 years ago with worsening.     There were no vitals filed for this visit.    Pertinent physical exam:  Awake, alert, ambulatory, non-labored respirations    Brief workup plan:  XR    Preliminary workup initiated; this workup will be continued and followed by the physician or advanced practice provider that is assigned to the patient when roomed.

## 2024-04-09 ENCOUNTER — HOSPITAL ENCOUNTER (EMERGENCY)
Facility: HOSPITAL | Age: 54
Discharge: PSYCHIATRIC OR SUBSTANCE USE FACILITY | End: 2024-04-10
Attending: FAMILY MEDICINE
Payer: MEDICAID

## 2024-04-09 DIAGNOSIS — R45.850 HOMICIDAL IDEATION: ICD-10-CM

## 2024-04-09 DIAGNOSIS — R44.0 AUDITORY HALLUCINATIONS: Primary | ICD-10-CM

## 2024-04-09 PROCEDURE — 99285 EMERGENCY DEPT VISIT HI MDM: CPT

## 2024-04-10 VITALS
RESPIRATION RATE: 20 BRPM | DIASTOLIC BLOOD PRESSURE: 78 MMHG | SYSTOLIC BLOOD PRESSURE: 124 MMHG | OXYGEN SATURATION: 99 % | HEIGHT: 75 IN | TEMPERATURE: 99 F | HEART RATE: 78 BPM | WEIGHT: 180 LBS | BODY MASS INDEX: 22.38 KG/M2

## 2024-04-10 LAB
ALBUMIN SERPL-MCNC: 4 G/DL (ref 3.5–5)
ALBUMIN/GLOB SERPL: 1.1 RATIO (ref 1.1–2)
ALP SERPL-CCNC: 64 UNIT/L (ref 40–150)
ALT SERPL-CCNC: 19 UNIT/L (ref 0–55)
AMPHET UR QL SCN: NEGATIVE
APPEARANCE UR: CLEAR
AST SERPL-CCNC: 26 UNIT/L (ref 5–34)
BACTERIA #/AREA URNS AUTO: ABNORMAL /HPF
BARBITURATE SCN PRESENT UR: NEGATIVE
BASOPHILS # BLD AUTO: 0.08 X10(3)/MCL
BASOPHILS NFR BLD AUTO: 1 %
BENZODIAZ UR QL SCN: NEGATIVE
BILIRUB SERPL-MCNC: 0.5 MG/DL
BILIRUB UR QL STRIP.AUTO: NEGATIVE
BUN SERPL-MCNC: 17.1 MG/DL (ref 8.4–25.7)
CALCIUM SERPL-MCNC: 9.5 MG/DL (ref 8.4–10.2)
CANNABINOIDS UR QL SCN: NEGATIVE
CHLORIDE SERPL-SCNC: 109 MMOL/L (ref 98–107)
CO2 SERPL-SCNC: 24 MMOL/L (ref 22–29)
COCAINE UR QL SCN: POSITIVE
COLOR UR AUTO: YELLOW
CREAT SERPL-MCNC: 1.41 MG/DL (ref 0.73–1.18)
EOSINOPHIL # BLD AUTO: 0.12 X10(3)/MCL (ref 0–0.9)
EOSINOPHIL NFR BLD AUTO: 1.6 %
ERYTHROCYTE [DISTWIDTH] IN BLOOD BY AUTOMATED COUNT: 13.4 % (ref 11.5–17)
ETHANOL SERPL-MCNC: 13 MG/DL
FENTANYL UR QL SCN: NEGATIVE
GFR SERPLBLD CREATININE-BSD FMLA CKD-EPI: 60 MLS/MIN/1.73/M2
GLOBULIN SER-MCNC: 3.6 GM/DL (ref 2.4–3.5)
GLUCOSE SERPL-MCNC: 89 MG/DL (ref 74–100)
GLUCOSE UR QL STRIP.AUTO: NORMAL
HCT VFR BLD AUTO: 40.8 % (ref 42–52)
HGB BLD-MCNC: 13.3 G/DL (ref 14–18)
IMM GRANULOCYTES # BLD AUTO: 0.02 X10(3)/MCL (ref 0–0.04)
IMM GRANULOCYTES NFR BLD AUTO: 0.3 %
KETONES UR QL STRIP.AUTO: NEGATIVE
LEUKOCYTE ESTERASE UR QL STRIP.AUTO: 250
LYMPHOCYTES # BLD AUTO: 1.96 X10(3)/MCL (ref 0.6–4.6)
LYMPHOCYTES NFR BLD AUTO: 25.5 %
MCH RBC QN AUTO: 29.8 PG (ref 27–31)
MCHC RBC AUTO-ENTMCNC: 32.6 G/DL (ref 33–36)
MCV RBC AUTO: 91.3 FL (ref 80–94)
MDMA UR QL SCN: NEGATIVE
MONOCYTES # BLD AUTO: 0.61 X10(3)/MCL (ref 0.1–1.3)
MONOCYTES NFR BLD AUTO: 7.9 %
NEUTROPHILS # BLD AUTO: 4.89 X10(3)/MCL (ref 2.1–9.2)
NEUTROPHILS NFR BLD AUTO: 63.7 %
NITRITE UR QL STRIP.AUTO: NEGATIVE
NRBC BLD AUTO-RTO: 0 %
OPIATES UR QL SCN: POSITIVE
PCP UR QL: NEGATIVE
PH UR STRIP.AUTO: 5.5 [PH]
PH UR: 5.5 [PH] (ref 3–11)
PLATELET # BLD AUTO: 190 X10(3)/MCL (ref 130–400)
PMV BLD AUTO: 11.8 FL (ref 7.4–10.4)
POTASSIUM SERPL-SCNC: 3.6 MMOL/L (ref 3.5–5.1)
PROT SERPL-MCNC: 7.6 GM/DL (ref 6.4–8.3)
PROT UR QL STRIP.AUTO: ABNORMAL
RBC # BLD AUTO: 4.47 X10(6)/MCL (ref 4.7–6.1)
RBC #/AREA URNS AUTO: ABNORMAL /HPF
RBC UR QL AUTO: NEGATIVE
SARS-COV-2 RDRP RESP QL NAA+PROBE: NEGATIVE
SODIUM SERPL-SCNC: 142 MMOL/L (ref 136–145)
SP GR UR STRIP.AUTO: 1.03 (ref 1–1.03)
SPECIFIC GRAVITY, URINE AUTO (.000) (OHS): 1.03 (ref 1–1.03)
SQUAMOUS #/AREA URNS LPF: ABNORMAL /HPF
TSH SERPL-ACNC: 0.79 UIU/ML (ref 0.35–4.94)
UROBILINOGEN UR STRIP-ACNC: NORMAL
WBC # SPEC AUTO: 7.68 X10(3)/MCL (ref 4.5–11.5)
WBC #/AREA URNS AUTO: ABNORMAL /HPF

## 2024-04-10 PROCEDURE — 81001 URINALYSIS AUTO W/SCOPE: CPT | Mod: XB | Performed by: FAMILY MEDICINE

## 2024-04-10 PROCEDURE — 80307 DRUG TEST PRSMV CHEM ANLYZR: CPT | Performed by: FAMILY MEDICINE

## 2024-04-10 PROCEDURE — 80053 COMPREHEN METABOLIC PANEL: CPT | Performed by: FAMILY MEDICINE

## 2024-04-10 PROCEDURE — 87086 URINE CULTURE/COLONY COUNT: CPT | Performed by: FAMILY MEDICINE

## 2024-04-10 PROCEDURE — 84443 ASSAY THYROID STIM HORMONE: CPT | Performed by: FAMILY MEDICINE

## 2024-04-10 PROCEDURE — 85025 COMPLETE CBC W/AUTO DIFF WBC: CPT | Performed by: FAMILY MEDICINE

## 2024-04-10 PROCEDURE — 82077 ASSAY SPEC XCP UR&BREATH IA: CPT | Performed by: FAMILY MEDICINE

## 2024-04-10 PROCEDURE — 87635 SARS-COV-2 COVID-19 AMP PRB: CPT | Performed by: FAMILY MEDICINE

## 2024-04-10 NOTE — ED PROVIDER NOTES
Encounter Date: 4/9/2024       History     Chief Complaint   Patient presents with    Psychiatric Evaluation     Pt presents for c/o AH. Pt states hearing voices telling him to hurt people. Pt denies SI, VH. Pt states has been med compliant but feels meds aren't working. Pt states also feels depressed and anxious. Pt calm and cooperative at present.      Patient is a 54-year-old gentleman presents emergency room with complaints of auditory hallucinations.  Reports hearing voices telling him to hurt other people.  Currently calm and cooperative.    The history is provided by the patient.     Review of patient's allergies indicates:  No Known Allergies  Past Medical History:   Diagnosis Date    Bipolar disorder, unspecified     History of psychiatric hospitalization     Hx of psychiatric care     Arcelia     Psychiatric problem     Schizophrenia, unspecified     Substance abuse      History reviewed. No pertinent surgical history.  Family History   Problem Relation Age of Onset    Depression Mother     Depression Brother      Social History     Tobacco Use    Smoking status: Former     Types: Cigarettes    Smokeless tobacco: Never   Substance Use Topics    Alcohol use: Not Currently    Drug use: Yes     Types: Cocaine, Methamphetamines     Review of Systems   Constitutional:  Negative for chills, fatigue and fever.   HENT:  Negative for ear pain, rhinorrhea and sore throat.    Eyes:  Negative for photophobia and pain.   Respiratory:  Negative for cough, shortness of breath and wheezing.    Cardiovascular:  Negative for chest pain.   Gastrointestinal:  Negative for abdominal pain, diarrhea, nausea and vomiting.   Genitourinary:  Negative for dysuria.   Neurological:  Negative for dizziness, weakness and headaches.   All other systems reviewed and are negative.      Physical Exam     Initial Vitals [04/09/24 2354]   BP Pulse Resp Temp SpO2   137/77 84 18 98.1 °F (36.7 °C) 99 %      MAP       --         Physical  Exam      Psychiatric: His speech is normal and behavior is normal. His mood appears anxious. He is actively hallucinating. He expresses impulsivity. He exhibits a depressed mood. He expresses homicidal ideation.         ED Course   Procedures  Labs Reviewed   COMPREHENSIVE METABOLIC PANEL - Abnormal; Notable for the following components:       Result Value    Chloride 109 (*)     Creatinine 1.41 (*)     Globulin 3.6 (*)     All other components within normal limits   URINALYSIS, REFLEX TO URINE CULTURE - Abnormal; Notable for the following components:    Protein, UA Trace (*)     Leukocyte Esterase,  (*)     WBC, UA 50-99 (*)     Bacteria, UA Few (*)     All other components within normal limits   DRUG SCREEN, URINE (BEAKER) - Abnormal; Notable for the following components:    Cocaine, Urine Positive (*)     Opiates, Urine Positive (*)     All other components within normal limits    Narrative:     Cut off concentrations:    Amphetamines - 1000 ng/ml  Barbiturates - 200 ng/ml  Benzodiazepine - 200 ng/ml  Cannabinoids (THC) - 50 ng/ml  Cocaine - 300 ng/ml  Fentanyl - 1.0 ng/ml  MDMA - 500 ng/ml  Opiates - 300 ng/ml   Phencyclidine (PCP) - 25 ng/ml    Specimen submitted for drug analysis and tested for pH and specific gravity in order to evaluate sample integrity. Suspect tampering if specific gravity is <1.003 and/or pH is not within the range of 4.5 - 8.0  False negatives may result form substances such as bleach added to urine.  False positives may result for the presence of a substance with similar chemical structure to the drug or its metabolite.    This test provides only a PRELIMINARY analytical test result. A more specific alternate chemical method must be used in order to obtain a confirmed analytical result. Gas chromatography/mass spectrometry (GC/MS) is the preferred confirmatory method. Other chemical confirmation methods are available. Clinical consideration and professional judgement should be  applied to any drug of abuse test result, particularly when preliminary positive results are used.    Positive results will be confirmed only at the physicians request. Unconfirmed screening results are to be used only for medical purposes (treatment).        ALCOHOL,MEDICAL (ETHANOL) - Abnormal; Notable for the following components:    Ethanol Level 13.0 (*)     All other components within normal limits   CBC WITH DIFFERENTIAL - Abnormal; Notable for the following components:    RBC 4.47 (*)     Hgb 13.3 (*)     Hct 40.8 (*)     MCHC 32.6 (*)     MPV 11.8 (*)     All other components within normal limits   TSH - Normal   CULTURE, URINE   CBC W/ AUTO DIFFERENTIAL    Narrative:     The following orders were created for panel order CBC auto differential.  Procedure                               Abnormality         Status                     ---------                               -----------         ------                     CBC with Differential[[id number]]       Abnormal            Final result                 Please view results for these tests on the individual orders.   SARS-COV-2 RNA AMPLIFICATION, QUAL   EXTRA TUBES    Narrative:     The following orders were created for panel order EXTRA TUBES.  Procedure                               Abnormality         Status                     ---------                               -----------         ------                     Red Top Hold[[id number]]                                                                 Please view results for these tests on the individual orders.   RED TOP HOLD          Imaging Results    None          Medications - No data to display  Medical Decision Making  54-year-old gentleman presents emergency room complaints of auditory hallucinations, noncompliant with medications, also having homicidal ideations towards nobody in particular.  Currently calm cooperative.  Will place under physician emergency certificate for inpatient psychiatric  evaluation treatment     Differential diagnosis:  Substance abuse, homicidal ideations, auditory hallucinations    Amount and/or Complexity of Data Reviewed  Labs: ordered.               ED Course as of 04/10/24 0219   Wed Apr 10, 2024   0218 Patient currently no acute distress.  Urinalysis positive for cocaine and opiates.  Medically cleared for psychiatric placement [MW]      ED Course User Index  [MW] Issac Gleason MD       Medically cleared for psychiatry placement: 4/10/2024  2:18 AM                   Clinical Impression:  Final diagnoses:  [R44.0] Auditory hallucinations (Primary)  [R45.850] Homicidal ideation          ED Disposition Condition    Transfer to Psych Facility Stable          ED Prescriptions    None       Follow-up Information    None          Issac Gleason MD  04/10/24 0219

## 2024-04-12 LAB — BACTERIA UR CULT: NORMAL

## 2024-07-07 ENCOUNTER — HOSPITAL ENCOUNTER (EMERGENCY)
Facility: HOSPITAL | Age: 54
Discharge: HOME | End: 2024-07-08
Attending: STUDENT IN AN ORGANIZED HEALTH CARE EDUCATION/TRAINING PROGRAM
Payer: MEDICAID

## 2024-07-07 DIAGNOSIS — I10 HYPERTENSION, UNSPECIFIED TYPE: Primary | ICD-10-CM

## 2024-07-07 DIAGNOSIS — R51.9 SINUS HEADACHE: ICD-10-CM

## 2024-07-07 LAB — POCT GLUCOSE: 95 MG/DL (ref 70–110)

## 2024-07-07 PROCEDURE — 99285 EMERGENCY DEPT VISIT HI MDM: CPT | Mod: 25

## 2024-07-07 PROCEDURE — 25000003 PHARM REV CODE 250: Performed by: NURSE PRACTITIONER

## 2024-07-07 PROCEDURE — 82962 GLUCOSE BLOOD TEST: CPT

## 2024-07-07 RX ORDER — AMOXICILLIN AND CLAVULANATE POTASSIUM 875; 125 MG/1; MG/1
1 TABLET, FILM COATED ORAL 2 TIMES DAILY
Qty: 14 TABLET | Refills: 0 | Status: ON HOLD | OUTPATIENT
Start: 2024-07-07

## 2024-07-07 RX ORDER — AMLODIPINE BESYLATE 10 MG/1
10 TABLET ORAL DAILY
Qty: 30 TABLET | Refills: 0 | Status: ON HOLD | OUTPATIENT
Start: 2024-07-07 | End: 2024-08-06

## 2024-07-07 RX ORDER — BUTALBITAL, ACETAMINOPHEN AND CAFFEINE 50; 325; 40 MG/1; MG/1; MG/1
1 TABLET ORAL
Status: COMPLETED | OUTPATIENT
Start: 2024-07-07 | End: 2024-07-07

## 2024-07-07 RX ORDER — AMLODIPINE BESYLATE 5 MG/1
10 TABLET ORAL
Status: COMPLETED | OUTPATIENT
Start: 2024-07-07 | End: 2024-07-07

## 2024-07-07 RX ORDER — HYDRALAZINE HYDROCHLORIDE 20 MG/ML
10 INJECTION INTRAMUSCULAR; INTRAVENOUS
Status: DISCONTINUED | OUTPATIENT
Start: 2024-07-07 | End: 2024-07-08 | Stop reason: HOSPADM

## 2024-07-07 RX ORDER — IBUPROFEN 600 MG/1
600 TABLET ORAL
Status: COMPLETED | OUTPATIENT
Start: 2024-07-07 | End: 2024-07-07

## 2024-07-07 RX ORDER — CETIRIZINE HYDROCHLORIDE 10 MG/1
10 TABLET ORAL DAILY
Qty: 30 TABLET | Refills: 0 | Status: ON HOLD | OUTPATIENT
Start: 2024-07-07 | End: 2024-08-06

## 2024-07-07 RX ADMIN — BUTALBITAL, ACETAMINOPHEN, AND CAFFEINE 1 TABLET: 325; 50; 40 TABLET ORAL at 10:07

## 2024-07-07 RX ADMIN — IBUPROFEN 600 MG: 600 TABLET, FILM COATED ORAL at 10:07

## 2024-07-07 RX ADMIN — AMLODIPINE BESYLATE 10 MG: 5 TABLET ORAL at 10:07

## 2024-07-08 VITALS
DIASTOLIC BLOOD PRESSURE: 95 MMHG | HEIGHT: 75 IN | SYSTOLIC BLOOD PRESSURE: 143 MMHG | WEIGHT: 180 LBS | HEART RATE: 67 BPM | OXYGEN SATURATION: 98 % | RESPIRATION RATE: 12 BRPM | TEMPERATURE: 98 F | BODY MASS INDEX: 22.38 KG/M2

## 2024-07-08 LAB
ANION GAP SERPL CALC-SCNC: 13 MMOL/L (ref 8–16)
BUN SERPL-MCNC: 12 MG/DL (ref 6–30)
CHLORIDE SERPL-SCNC: 100 MMOL/L (ref 95–110)
CREAT SERPL-MCNC: 1.1 MG/DL (ref 0.5–1.4)
GLUCOSE SERPL-MCNC: 92 MG/DL (ref 70–110)
HCT VFR BLD CALC: 39 %PCV (ref 36–54)
HGB BLD-MCNC: 13 G/DL
POC IONIZED CALCIUM: 1.18 MMOL/L (ref 1.06–1.42)
POC TCO2 (MEASURED): 30 MMOL/L (ref 23–29)
POTASSIUM BLD-SCNC: 3.6 MMOL/L (ref 3.5–5.1)
SAMPLE: ABNORMAL
SODIUM BLD-SCNC: 138 MMOL/L (ref 136–145)

## 2024-07-08 PROCEDURE — 80048 BASIC METABOLIC PNL TOTAL CA: CPT

## 2024-07-08 RX ORDER — BUTALBITAL, ACETAMINOPHEN AND CAFFEINE 50; 325; 40 MG/1; MG/1; MG/1
1 TABLET ORAL 3 TIMES DAILY PRN
Qty: 9 TABLET | Refills: 0 | Status: ON HOLD | OUTPATIENT
Start: 2024-07-08 | End: 2024-07-11

## 2024-07-08 NOTE — DISCHARGE INSTRUCTIONS
Take augmentin for 7 days. Cetirizine for congestion. Norvasc for blood pressure. Fioricet for headache as needed. Tylenol and ibuprofen if needed. Follow up with primary care provider [phone]

## 2024-07-08 NOTE — ED PROVIDER NOTES
"Encounter Date: 7/7/2024       History     Chief Complaint   Patient presents with    Headache     Pt c/o HA, blurred vision started on friday, back pain "from top to bottom". Denies fall/trauma/heavy lifting.     See MDM    The history is provided by the patient. No  was used.     Review of patient's allergies indicates:  No Known Allergies  Past Medical History:   Diagnosis Date    Bipolar disorder, unspecified     History of psychiatric hospitalization     Hx of psychiatric care     [facility]celia     Psychiatric problem     Schizophrenia, unspecified     Substance abuse      No past surgical history on file.  Family History   Problem Relation Name Age of Onset    Depression Mother      Depression Brother       Social History     Tobacco Use    Smoking status: Former     Types: Cigarettes    Smokeless tobacco: Never   Substance Use Topics    Alcohol use: Not Currently    Drug use: Yes     Types: Cocaine, Methamphetamines     Review of Systems   HENT:  Positive for congestion and sinus pain.    Respiratory:  Positive for cough.    Neurological:  Positive for dizziness and headaches.   All other systems reviewed and are negative.      Physical Exam     Initial Vitals [07/07/24 2204]   BP Pulse Resp Temp SpO2   (!) 161/92 66 18 98 °F (36.7 °C) 97 %      MAP       --         Physical Exam    Nursing note and vitals reviewed.  Constitutional: He appears well-developed.   HENT:   Right Ear: Tympanic membrane and ear canal normal.   Left Ear: Tympanic membrane and ear canal normal.   Mouth/Throat: Oropharynx is clear and moist.   Eyes: Conjunctivae and EOM are normal. Pupils are equal, round, and reactive to light.   Neck:   Normal range of motion.  Cardiovascular:  Normal rate, regular rhythm and normal heart sounds.           Pulmonary/Chest: Breath sounds normal. No respiratory distress.   Abdominal: Abdomen is soft. He exhibits no distension. There is no abdominal tenderness.   Musculoskeletal:         " General: Normal range of motion.      Cervical back: Normal range of motion.      Comments: Bilateral UE and LE strength equal and strong. No deficits. +ambulatory steadily      Neurological: He is alert and oriented to person, place, and time. He has normal strength.   Skin: Skin is warm and dry.   Psychiatric: He has a normal mood and affect.         ED Course   Procedures  Labs Reviewed   POCT CHEMISTRY PANEL   POCT GLUCOSE          Imaging Results              CT Head Without Contrast (Final result)  Result time 07/07/24 22:35:56      Final result by Reggie Brian MD (07/07/24 22:35:56)                   Impression:      No acute intracranial abnormalities are seen.    Paranasal sinusitis      Electronically signed by: Reggie Brian MD  Date:    07/07/2024  Time:    22:35               Narrative:    EXAMINATION:  CT HEAD WITHOUT CONTRAST    CLINICAL HISTORY:  Transient ischemic attack (TIA);    TECHNIQUE:  Low dose axial images were obtained through the head.  Coronal and sagittal reformations were also performed. Contrast was not administered.    Automatic exposure control (AEC) was utilized for dose reduction.    Dose: 1067 mGycm    COMPARISON:  None.    FINDINGS:  Ventricles are of normal size and shape there is no shift of the midline noted.  There are no extra-axial fluid collections are areas consistent with hemorrhage noted.  No masses is seen no acute infarcts are noted.  There is opacification of the left frontal left maxillary left ethmoid sinus consistent with sinusitis.                                       Medications   hydrALAZINE injection 10 mg (10 mg Intramuscular Not Given 7/7/24 2245)   butalbital-acetaminophen-caffeine -40 mg per tablet 1 tablet (1 tablet Oral Given 7/7/24 2245)   ibuprofen tablet 600 mg (600 mg Oral Given 7/7/24 2245)   amLODIPine tablet 10 mg (10 mg Oral Given 7/7/24 2250)     Medical Decision Making  55 y/o male presents with cough and sinus pressure for a month  now. Also having headache, dizziness, light sensitivity for the past 2 days. No vomiting. No confusion. His bp is elevated here and when questioned if he is on medications for this he states no but also states he doesn't know if he has htn because he doesn't really follow up with anyone. No fever.     Glucose normal. Ct head neg for acute findings. Afebrile. Oxygen 99% RA and no labored breathing or abnormal lung sounds. Meds given for had pain along with bp. Will send home with medications for sinusitis for 1 month, bp control as well. Bp trending down some now 146/101     Poc chem 8 no acute findings     Amount and/or Complexity of Data Reviewed  Labs: ordered. Decision-making details documented in ED Course.  Radiology: ordered. Decision-making details documented in ED Course.    Risk  OTC drugs.  Prescription drug management.      Additional MDM:   Differential Diagnosis:   Other: The following diagnoses were also considered and will be evaluated: head bleed, hypertension and sinusitis.            ED Course as of 07/08/24 0024   Mon Jul 08, 2024   0007 Bp has improved now to 148/86. His headache has improved some. Still some blurry vision. Will get poct chem 8 to check blood counts, kidney function.  [EV]      ED Course User Index  [EV] Brittanie Cruz FNP                           Clinical Impression:  Final diagnoses:  [I10] Hypertension, unspecified type (Primary)  [R51.9] Sinus headache          ED Disposition Condition    Discharge Stable          ED Prescriptions       Medication Sig Dispense Start Date End Date Auth. Provider    cetirizine (ZYRTEC) 10 MG tablet Take 1 tablet (10 mg total) by mouth once daily. 30 tablet 7/7/2024 8/6/2024 Brittanie Cruz FNP    amoxicillin-clavulanate 875-125mg (AUGMENTIN) 875-125 mg per tablet Take 1 tablet by mouth 2 (two) times daily. 14 tablet 7/7/2024 -- Brittanie Cruz FNP    amLODIPine (NORVASC) 10 MG tablet Take 1 tablet (10 mg total) by mouth once daily. 30  tablet 7/7/2024 8/6/2024 Brittanie Cruz FNP    butalbital-acetaminophen-caffeine -40 mg (FIORICET, ESGIC) -40 mg per tablet Take 1 tablet by mouth 3 (three) times daily as needed for Headaches. 9 tablet 7/8/2024 7/11/2024 Brittanie Cruz FNP          Follow-up Information       Follow up With Specialties Details Why Contact Info    primary care provider  Call in 1 week  [phone]             Brittanie Cruz FNP  07/08/24 0023       Brittanie Cruz FNP  07/08/24 0024

## 2024-07-12 ENCOUNTER — HOSPITAL ENCOUNTER (EMERGENCY)
Facility: HOSPITAL | Age: 54
Discharge: PSYCHIATRIC OR SUBSTANCE USE FACILITY | End: 2024-07-12
Attending: EMERGENCY MEDICINE
Payer: MEDICAID

## 2024-07-12 ENCOUNTER — HOSPITAL ENCOUNTER (INPATIENT)
Facility: HOSPITAL | Age: 54
LOS: 6 days | Discharge: HOME | DRG: 885 | End: 2024-07-18
Attending: PSYCHIATRY & NEUROLOGY | Admitting: PSYCHIATRY & NEUROLOGY
Payer: MEDICAID

## 2024-07-12 VITALS
WEIGHT: 185 LBS | HEART RATE: 53 BPM | RESPIRATION RATE: 18 BRPM | BODY MASS INDEX: 23 KG/M2 | OXYGEN SATURATION: 98 % | TEMPERATURE: 97 F | HEIGHT: 75 IN | SYSTOLIC BLOOD PRESSURE: 131 MMHG | DIASTOLIC BLOOD PRESSURE: 89 MMHG

## 2024-07-12 DIAGNOSIS — F20.9 SCHIZOPHRENIA, UNSPECIFIED TYPE: ICD-10-CM

## 2024-07-12 DIAGNOSIS — F29 PSYCHOSIS: ICD-10-CM

## 2024-07-12 DIAGNOSIS — R45.850 HOMICIDAL IDEATION: Primary | ICD-10-CM

## 2024-07-12 DIAGNOSIS — R44.3 HALLUCINATIONS: ICD-10-CM

## 2024-07-12 LAB
ALBUMIN SERPL-MCNC: 3.5 G/DL (ref 3.5–5)
ALBUMIN/GLOB SERPL: 1.1 RATIO (ref 1.1–2)
ALP SERPL-CCNC: 72 UNIT/L (ref 40–150)
ALT SERPL-CCNC: 18 UNIT/L (ref 0–55)
AMPHET UR QL SCN: NEGATIVE
ANION GAP SERPL CALC-SCNC: 6 MEQ/L
AST SERPL-CCNC: 21 UNIT/L (ref 5–34)
BACTERIA #/AREA URNS AUTO: NORMAL /HPF
BARBITURATE SCN PRESENT UR: NEGATIVE
BASOPHILS # BLD AUTO: 0.05 X10(3)/MCL
BASOPHILS NFR BLD AUTO: 0.9 %
BENZODIAZ UR QL SCN: NEGATIVE
BILIRUB SERPL-MCNC: 0.3 MG/DL
BILIRUB UR QL STRIP.AUTO: NEGATIVE
BUN SERPL-MCNC: 15.8 MG/DL (ref 8.4–25.7)
CALCIUM SERPL-MCNC: 8.9 MG/DL (ref 8.4–10.2)
CANNABINOIDS UR QL SCN: NEGATIVE
CHLORIDE SERPL-SCNC: 105 MMOL/L (ref 98–107)
CLARITY UR: CLEAR
CO2 SERPL-SCNC: 26 MMOL/L (ref 22–29)
COCAINE UR QL SCN: POSITIVE
COLOR UR AUTO: NORMAL
CREAT SERPL-MCNC: 1.16 MG/DL (ref 0.73–1.18)
CREAT/UREA NIT SERPL: 14
EOSINOPHIL # BLD AUTO: 0.22 X10(3)/MCL (ref 0–0.9)
EOSINOPHIL NFR BLD AUTO: 3.9 %
ERYTHROCYTE [DISTWIDTH] IN BLOOD BY AUTOMATED COUNT: 14.5 % (ref 11.5–17)
ETHANOL SERPL-MCNC: <10 MG/DL
FENTANYL UR QL SCN: NEGATIVE
GFR SERPLBLD CREATININE-BSD FMLA CKD-EPI: >60 ML/MIN/1.73/M2
GLOBULIN SER-MCNC: 3.3 GM/DL (ref 2.4–3.5)
GLUCOSE SERPL-MCNC: 83 MG/DL (ref 74–100)
GLUCOSE UR QL STRIP: NORMAL
HCT VFR BLD AUTO: 37.9 % (ref 42–52)
HGB BLD-MCNC: 12.2 G/DL (ref 14–18)
HGB UR QL STRIP: NEGATIVE
IMM GRANULOCYTES # BLD AUTO: 0.02 X10(3)/MCL (ref 0–0.04)
IMM GRANULOCYTES NFR BLD AUTO: 0.4 %
KETONES UR QL STRIP: NEGATIVE
LEUKOCYTE ESTERASE UR QL STRIP: NEGATIVE
LYMPHOCYTES # BLD AUTO: 1.6 X10(3)/MCL (ref 0.6–4.6)
LYMPHOCYTES NFR BLD AUTO: 28.7 %
MCH RBC QN AUTO: 30.4 PG (ref 27–31)
MCHC RBC AUTO-ENTMCNC: 32.2 G/DL (ref 33–36)
MCV RBC AUTO: 94.5 FL (ref 80–94)
MDMA UR QL SCN: NEGATIVE
MONOCYTES # BLD AUTO: 0.6 X10(3)/MCL (ref 0.1–1.3)
MONOCYTES NFR BLD AUTO: 10.8 %
NEUTROPHILS # BLD AUTO: 3.09 X10(3)/MCL (ref 2.1–9.2)
NEUTROPHILS NFR BLD AUTO: 55.3 %
NITRITE UR QL STRIP: NEGATIVE
NRBC BLD AUTO-RTO: 0 %
OPIATES UR QL SCN: NEGATIVE
PCP UR QL: NEGATIVE
PH UR STRIP: 5.5 [PH]
PH UR: 5.5 [PH] (ref 3–11)
PLATELET # BLD AUTO: 216 X10(3)/MCL (ref 130–400)
PMV BLD AUTO: 11.9 FL (ref 7.4–10.4)
POTASSIUM SERPL-SCNC: 4.9 MMOL/L (ref 3.5–5.1)
PROT SERPL-MCNC: 6.8 GM/DL (ref 6.4–8.3)
PROT UR QL STRIP: NEGATIVE
RBC # BLD AUTO: 4.01 X10(6)/MCL (ref 4.7–6.1)
RBC #/AREA URNS AUTO: NORMAL /HPF
SARS-COV-2 RDRP RESP QL NAA+PROBE: NEGATIVE
SODIUM SERPL-SCNC: 137 MMOL/L (ref 136–145)
SP GR UR STRIP.AUTO: 1.02 (ref 1–1.03)
SPECIFIC GRAVITY, URINE AUTO (.000) (OHS): 1.02 (ref 1–1.03)
SQUAMOUS #/AREA URNS LPF: NORMAL /HPF
TSH SERPL-ACNC: 2.23 UIU/ML (ref 0.35–4.94)
UROBILINOGEN UR STRIP-ACNC: NORMAL
WBC # BLD AUTO: 5.58 X10(3)/MCL (ref 4.5–11.5)
WBC #/AREA URNS AUTO: NORMAL /HPF

## 2024-07-12 PROCEDURE — 11400000 HC PSYCH PRIVATE ROOM

## 2024-07-12 PROCEDURE — 81001 URINALYSIS AUTO W/SCOPE: CPT | Mod: XB

## 2024-07-12 PROCEDURE — 80307 DRUG TEST PRSMV CHEM ANLYZR: CPT

## 2024-07-12 PROCEDURE — 82077 ASSAY SPEC XCP UR&BREATH IA: CPT

## 2024-07-12 PROCEDURE — 85025 COMPLETE CBC W/AUTO DIFF WBC: CPT

## 2024-07-12 PROCEDURE — 84443 ASSAY THYROID STIM HORMONE: CPT

## 2024-07-12 PROCEDURE — 80053 COMPREHEN METABOLIC PANEL: CPT

## 2024-07-12 PROCEDURE — 99285 EMERGENCY DEPT VISIT HI MDM: CPT

## 2024-07-12 PROCEDURE — U0002 COVID-19 LAB TEST NON-CDC: HCPCS

## 2024-07-12 PROCEDURE — 25000003 PHARM REV CODE 250: Performed by: PSYCHIATRY & NEUROLOGY

## 2024-07-12 RX ORDER — ADHESIVE BANDAGE
30 BANDAGE TOPICAL DAILY PRN
Status: DISCONTINUED | OUTPATIENT
Start: 2024-07-12 | End: 2024-07-18 | Stop reason: HOSPADM

## 2024-07-12 RX ORDER — OLMESARTAN MEDOXOMIL 40 MG/1
40 TABLET ORAL DAILY
Status: ON HOLD | COMMUNITY
End: 2024-07-17

## 2024-07-12 RX ORDER — ONDANSETRON 4 MG/1
4 TABLET, ORALLY DISINTEGRATING ORAL EVERY 6 HOURS PRN
Status: DISCONTINUED | OUTPATIENT
Start: 2024-07-12 | End: 2024-07-18 | Stop reason: HOSPADM

## 2024-07-12 RX ORDER — PANTOPRAZOLE SODIUM 40 MG/1
40 TABLET, DELAYED RELEASE ORAL 2 TIMES DAILY
Status: ON HOLD | COMMUNITY
End: 2024-07-17

## 2024-07-12 RX ORDER — LORAZEPAM 2 MG/ML
2 INJECTION INTRAMUSCULAR EVERY 4 HOURS PRN
Status: DISCONTINUED | OUTPATIENT
Start: 2024-07-12 | End: 2024-07-18 | Stop reason: HOSPADM

## 2024-07-12 RX ORDER — IBUPROFEN 200 MG
1 TABLET ORAL DAILY
Status: DISCONTINUED | OUTPATIENT
Start: 2024-07-12 | End: 2024-07-17

## 2024-07-12 RX ORDER — HALOPERIDOL 5 MG/1
10 TABLET ORAL EVERY 4 HOURS PRN
Status: DISCONTINUED | OUTPATIENT
Start: 2024-07-12 | End: 2024-07-18 | Stop reason: HOSPADM

## 2024-07-12 RX ORDER — TRAZODONE HYDROCHLORIDE 100 MG/1
100 TABLET ORAL NIGHTLY PRN
Status: DISCONTINUED | OUTPATIENT
Start: 2024-07-12 | End: 2024-07-18 | Stop reason: HOSPADM

## 2024-07-12 RX ORDER — CARVEDILOL 6.25 MG/1
6.25 TABLET ORAL 2 TIMES DAILY WITH MEALS
Status: ON HOLD | COMMUNITY
End: 2024-07-17

## 2024-07-12 RX ORDER — ROSUVASTATIN CALCIUM 20 MG/1
20 TABLET, COATED ORAL DAILY
Status: ON HOLD | COMMUNITY
End: 2024-07-17

## 2024-07-12 RX ORDER — HALOPERIDOL 5 MG/ML
10 INJECTION INTRAMUSCULAR EVERY 4 HOURS PRN
Status: DISCONTINUED | OUTPATIENT
Start: 2024-07-12 | End: 2024-07-18 | Stop reason: HOSPADM

## 2024-07-12 RX ORDER — CLONIDINE HYDROCHLORIDE 0.1 MG/1
0.1 TABLET ORAL EVERY 8 HOURS PRN
Status: DISCONTINUED | OUTPATIENT
Start: 2024-07-12 | End: 2024-07-18 | Stop reason: HOSPADM

## 2024-07-12 RX ORDER — ATORVASTATIN CALCIUM 40 MG/1
40 TABLET, FILM COATED ORAL DAILY
Status: ON HOLD | COMMUNITY
End: 2024-07-17

## 2024-07-12 RX ORDER — SUCRALFATE 1 G/1
1 TABLET ORAL DAILY
Status: ON HOLD | COMMUNITY
End: 2024-07-17

## 2024-07-12 RX ORDER — FUROSEMIDE 40 MG/1
40 TABLET ORAL DAILY
Status: ON HOLD | COMMUNITY
End: 2024-07-17

## 2024-07-12 RX ORDER — HYDRALAZINE HYDROCHLORIDE 50 MG/1
50 TABLET, FILM COATED ORAL 3 TIMES DAILY
Status: ON HOLD | COMMUNITY
End: 2024-07-17

## 2024-07-12 RX ORDER — LOSARTAN POTASSIUM 25 MG/1
25 TABLET ORAL DAILY
Status: ON HOLD | COMMUNITY
End: 2024-07-17

## 2024-07-12 RX ORDER — CLONIDINE HYDROCHLORIDE 0.1 MG/1
0.2 TABLET ORAL EVERY 8 HOURS PRN
Status: DISCONTINUED | OUTPATIENT
Start: 2024-07-12 | End: 2024-07-18 | Stop reason: HOSPADM

## 2024-07-12 RX ORDER — ISOSORBIDE DINITRATE 30 MG/1
30 TABLET ORAL DAILY
Status: ON HOLD | COMMUNITY
End: 2024-07-17

## 2024-07-12 RX ORDER — ALUMINUM HYDROXIDE, MAGNESIUM HYDROXIDE, AND SIMETHICONE 1200; 120; 1200 MG/30ML; MG/30ML; MG/30ML
30 SUSPENSION ORAL EVERY 6 HOURS PRN
Status: DISCONTINUED | OUTPATIENT
Start: 2024-07-12 | End: 2024-07-18 | Stop reason: HOSPADM

## 2024-07-12 RX ORDER — HYDROXYZINE HYDROCHLORIDE 50 MG/1
50 TABLET, FILM COATED ORAL EVERY 4 HOURS PRN
Status: DISCONTINUED | OUTPATIENT
Start: 2024-07-12 | End: 2024-07-18 | Stop reason: HOSPADM

## 2024-07-12 RX ORDER — NITROGLYCERIN 0.4 MG/1
0.4 TABLET SUBLINGUAL EVERY 5 MIN PRN
Status: ON HOLD | COMMUNITY
End: 2024-07-17

## 2024-07-12 RX ORDER — LORAZEPAM 1 MG/1
2 TABLET ORAL EVERY 4 HOURS PRN
Status: DISCONTINUED | OUTPATIENT
Start: 2024-07-12 | End: 2024-07-18 | Stop reason: HOSPADM

## 2024-07-12 RX ORDER — VENLAFAXINE 37.5 MG/1
37.5 TABLET ORAL DAILY
Status: ON HOLD | COMMUNITY
End: 2024-07-17

## 2024-07-12 RX ORDER — DIPHENHYDRAMINE HCL 50 MG
50 CAPSULE ORAL EVERY 4 HOURS PRN
Status: DISCONTINUED | OUTPATIENT
Start: 2024-07-12 | End: 2024-07-18 | Stop reason: HOSPADM

## 2024-07-12 RX ORDER — DIPHENHYDRAMINE HYDROCHLORIDE 50 MG/ML
50 INJECTION INTRAMUSCULAR; INTRAVENOUS EVERY 4 HOURS PRN
Status: DISCONTINUED | OUTPATIENT
Start: 2024-07-12 | End: 2024-07-18 | Stop reason: HOSPADM

## 2024-07-12 RX ORDER — ACETAMINOPHEN 325 MG/1
650 TABLET ORAL EVERY 6 HOURS PRN
Status: DISCONTINUED | OUTPATIENT
Start: 2024-07-12 | End: 2024-07-18 | Stop reason: HOSPADM

## 2024-07-12 RX ADMIN — CLONIDINE HYDROCHLORIDE 0.1 MG: 0.1 TABLET ORAL at 08:07

## 2024-07-12 NOTE — H&P
[facility]  History & Physical    Subjective:      Chief Complaint/Reason for Admission: auditory hallucinations     Kevin Dukes is a 54 y.o. male. Auditory hallucinations     Past Medical History:   Diagnosis Date    Bipolar disorder, unspecified     History of psychiatric hospitalization     Hx of psychiatric care     [place]     Psychiatric problem     Schizophrenia, unspecified     Substance abuse      History reviewed. No pertinent surgical history.  Family History   Problem Relation Name Age of Onset    Depression Mother      Depression Brother       Social History     Tobacco Use    Smoking status: Former     Types: Cigarettes    Smokeless tobacco: Never   Substance Use Topics    Alcohol use: Not Currently    Drug use: Yes     Types: Cocaine, Methamphetamines       PTA Medications   Medication Sig    amLODIPine (NORVASC) 10 MG tablet Take 1 tablet (10 mg total) by mouth once daily.    amoxicillin-clavulanate 875-125mg (AUGMENTIN) 875-125 mg per tablet Take 1 tablet by mouth 2 (two) times daily.    atorvastatin (LIPITOR) 40 MG tablet Take 40 mg by mouth once daily.    buPROPion (WELLBUTRIN XL) 150 MG TB24 tablet Take 1 tablet (150 mg total) by mouth once daily.    [] butalbital-acetaminophen-caffeine -40 mg (FIORICET, ESGIC) -40 mg per tablet Take 1 tablet by mouth 3 (three) times daily as needed for Headaches.    carvediloL (COREG) 6.25 MG tablet Take 6.25 mg by mouth 2 (two) times daily with meals.    cetirizine (ZYRTEC) 10 MG tablet Take 1 tablet (10 mg total) by mouth once daily.    furosemide (LASIX) 40 MG tablet Take 40 mg by mouth once daily.    hydrALAZINE (APRESOLINE) 50 MG tablet Take 50 mg by mouth 3 (three) times daily.    isosorbide dinitrate (ISORDIL) 30 MG Tab Take 30 mg by mouth once daily.    losartan (COZAAR) 25 MG tablet Take 25 mg by mouth once daily.    nitroGLYCERIN (NITROSTAT) 0.4 MG SL tablet Place 0.4 mg under the  tongue every 5 (five) minutes as needed for Chest pain.    nortriptyline (PAMELOR) 10 MG capsule Take 1 capsule (10 mg total) by mouth 3 (three) times daily.    OLANZapine (ZYPREXA) 10 MG tablet Take 1 tablet (10 mg total) by mouth every evening.    olmesartan (BENICAR) 40 MG tablet Take 40 mg by mouth once daily.    pantoprazole (PROTONIX) 40 MG tablet Take 40 mg by mouth 2 (two) times daily.    rosuvastatin (CRESTOR) 20 MG tablet Take 20 mg by mouth once daily.    sucralfate (CARAFATE) 1 gram tablet Take 1 g by mouth once daily.    venlafaxine (EFFEXOR) 37.5 MG Tab Take 37.5 mg by mouth once daily.     Review of patient's allergies indicates:  No Known Allergies     Review of Systems   Constitutional: Negative.    HENT: Negative.     Eyes: Negative.    Respiratory: Negative.     Cardiovascular: Negative.    Gastrointestinal: Negative.    Genitourinary: Negative.    Musculoskeletal: Negative.    Skin: Negative.    Neurological: Negative.    Endo/Heme/Allergies: Negative.    Psychiatric/Behavioral:  Positive for hallucinations. Negative for depression, substance abuse and suicidal ideas.        Objective:      Vital Signs (Most Recent)  Temp: 98.4 °F (36.9 °C) (07/12/24 1057)  Pulse: (!) 52 (07/12/24 1057)  Resp: 20 (07/12/24 1057)  BP: 135/85 (07/12/24 1057)    Vital Signs Range (Last 24H):  Temp:  [97.4 °F (36.3 °C)-98.4 °F (36.9 °C)]   Pulse:  [52-53]   Resp:  [18-20]   BP: (131-135)/(85-89)   SpO2:  [98 %]     Physical Exam  HENT:      Head: Normocephalic.      Right Ear: Tympanic membrane normal.      Left Ear: Tympanic membrane normal.      Nose: Nose normal.      Mouth/Throat:      Mouth: Mucous membranes are moist.   Eyes:      Extraocular Movements: Extraocular movements intact.      Pupils: Pupils are equal, round, and reactive to light.   Cardiovascular:      Rate and Rhythm: Normal rate and regular rhythm.   Pulmonary:      Effort: Pulmonary effort is normal.   Abdominal:      General: Abdomen is flat.    Musculoskeletal:         General: Normal range of motion.   Skin:     General: Skin is warm.   Neurological:      General: No focal deficit present.      Mental Status: He is alert and oriented to person, place, and time.      Comments: Vision normal   Hearing normal   EOM intact   Face muscles normal  Facial sensation normal   Shrugs shoulders  Tongue midline            Data Review:    Recent Results (from the past 48 hour(s))   Urinalysis, Reflex to Urine Culture    Collection Time: 07/12/24  5:22 AM    Specimen: Urine   Result Value Ref Range    Color, UA Light-Yellow Yellow, Light-Yellow, Colorless, Straw, Dark-Yellow    Appearance, UA Clear Clear    Specific Gravity, UA 1.019 1.005 - 1.030    pH, UA 5.5 5.0 - 8.5    Protein, UA Negative Negative    Glucose, UA Normal Negative, Normal    Ketones, UA Negative Negative    Blood, UA Negative Negative    Bilirubin, UA Negative Negative    Urobilinogen, UA Normal 0.2, 1.0, Normal    Nitrites, UA Negative Negative    Leukocyte Esterase, UA Negative Negative    RBC, UA 0-5 None Seen, 0-2, 3-5, 0-5 /HPF    WBC, UA 0-5 None Seen, 0-2, 3-5, 0-5 /HPF    Bacteria, UA None Seen None Seen, Trace /HPF    Squamous Epithelial Cells, UA Trace None Seen /HPF   Drug Screen, Urine    Collection Time: 07/12/24  5:22 AM   Result Value Ref Range    Amphetamines, Urine Negative Negative    Barbiturates, Urine Negative Negative    Benzodiazepine, Urine Negative Negative    Cannabinoids, Urine Negative Negative    Cocaine, Urine Positive (A) Negative    Fentanyl, Urine Negative Negative    MDMA, Urine Negative Negative    Opiates, Urine Negative Negative    Phencyclidine, Urine Negative Negative    pH, Urine 5.5 3.0 - 11.0    Specific Gravity, Urine Auto 1.019 1.001 - 1.035   COVID-19 Rapid Screening    Collection Time: 07/12/24  5:23 AM   Result Value Ref Range    SARS COV-2 Molecular Negative Negative   Comprehensive metabolic panel    Collection Time: 07/12/24  6:16 AM   Result Value  Ref Range    Sodium 137 136 - 145 mmol/L    Potassium 4.9 3.5 - 5.1 mmol/L    Chloride 105 98 - 107 mmol/L    CO2 26 22 - 29 mmol/L    Glucose 83 74 - 100 mg/dL    Blood Urea Nitrogen 15.8 8.4 - 25.7 mg/dL    Creatinine 1.16 0.73 - 1.18 mg/dL    Calcium 8.9 8.4 - 10.2 mg/dL    Protein Total 6.8 6.4 - 8.3 gm/dL    Albumin 3.5 3.5 - 5.0 g/dL    Globulin 3.3 2.4 - 3.5 gm/dL    Albumin/Globulin Ratio 1.1 1.1 - 2.0 ratio    Bilirubin Total 0.3 <=1.5 mg/dL    ALP 72 40 - 150 unit/L    ALT 18 0 - 55 unit/L    AST 21 5 - 34 unit/L    eGFR >60 mL/min/1.73/m2    Anion Gap 6.0 mEq/L    BUN/Creatinine Ratio 14    TSH    Collection Time: 07/12/24  6:16 AM   Result Value Ref Range    TSH 2.234 0.350 - 4.940 uIU/mL   Ethanol    Collection Time: 07/12/24  6:16 AM   Result Value Ref Range    Ethanol Level <10.0 <=10.0 mg/dL   CBC with Differential    Collection Time: 07/12/24  6:16 AM   Result Value Ref Range    WBC 5.58 4.50 - 11.50 x10(3)/mcL    RBC 4.01 (L) 4.70 - 6.10 x10(6)/mcL    Hgb 12.2 (L) 14.0 - 18.0 g/dL    Hct 37.9 (L) 42.0 - 52.0 %    MCV 94.5 (H) 80.0 - 94.0 fL    MCH 30.4 27.0 - 31.0 pg    MCHC 32.2 (L) 33.0 - 36.0 g/dL    RDW 14.5 11.5 - 17.0 %    Platelet 216 130 - 400 x10(3)/mcL    MPV 11.9 (H) 7.4 - 10.4 fL    Neut % 55.3 %    Lymph % 28.7 %    Mono % 10.8 %    Eos % 3.9 %    Basophil % 0.9 %    Lymph # 1.60 0.6 - 4.6 x10(3)/mcL    Neut # 3.09 2.1 - 9.2 x10(3)/mcL    Mono # 0.60 0.1 - 1.3 x10(3)/mcL    Eos # 0.22 0 - 0.9 x10(3)/mcL    Baso # 0.05 <=0.2 x10(3)/mcL    IG# 0.02 0 - 0.04 x10(3)/mcL    IG% 0.4 %    NRBC% 0.0 %        No results found.       Assessment and Plan       Hallucinations  Schizophrenia?

## 2024-07-12 NOTE — ED PROVIDER NOTES
"Encounter Date: 7/12/2024       History     Chief Complaint   Patient presents with    Psychiatric Evaluation     Pt presents reporting auditory hallucinations instructing him to harm others. Denies SI. Pt also reports visual hallucinations. Pt reports has been out of his psyc meds for an uknown amount of time. At this time pt is calm but guarded in triage. Exhibiting flat affect.     HPI  53 y/o male with hx of schizophrenia and bipolar disorder presents to ED for hallucinations and thoughts of homicide. Patient states he is hearing voices telling him to hurt/kill others. When asked who the voices are instructing him to harm, pt states "everyone." He also reports seeing shadows. He denies SI. Reports noncompliance with his psychiatric medications and multiple psychiatric hospitalizations. Denies recent illness, fever, CP, SOB.       Review of patient's allergies indicates:  No Known Allergies  Past Medical History:   Diagnosis Date    Bipolar disorder, unspecified     History of psychiatric hospitalization     Hx of psychiatric care     [place]ia     Psychiatric problem     Schizophrenia, unspecified     Substance abuse      No past surgical history on file.  Family History   Problem Relation Name Age of Onset    Depression Mother      Depression Brother       Social History     Tobacco Use    Smoking status: Former     Types: Cigarettes    Smokeless tobacco: Never   Substance Use Topics    Alcohol use: Not Currently    Drug use: Yes     Types: Cocaine, Methamphetamines     Review of Systems   Constitutional:  Negative for fever.   HENT:  Negative for congestion.    Eyes:  Negative for visual disturbance.   Respiratory:  Negative for cough and shortness of breath.    Cardiovascular:  Negative for chest pain and leg swelling.   Gastrointestinal:  Negative for abdominal pain, nausea and vomiting.   Genitourinary:  Negative for dysuria.   Neurological:  Negative for headaches.   Psychiatric/Behavioral:  Positive for " hallucinations. Negative for self-injury and suicidal ideas.         +HI       Physical Exam     Initial Vitals [07/12/24 0501]   BP Pulse Resp Temp SpO2   131/89 (!) 53 18 97.4 °F (36.3 °C) 98 %      MAP       --         Physical Exam    Vitals reviewed.  Constitutional: He appears well-developed. No distress.   Calm and cooperative on exam    HENT:   Head: Normocephalic and atraumatic.   Nose: Nose normal.   Eyes: Conjunctivae and EOM are normal.   Neck:   Normal range of motion.  Cardiovascular:  Normal rate and regular rhythm.           Pulmonary/Chest: Breath sounds normal.   Musculoskeletal:      Cervical back: Normal range of motion.     Skin: Skin is warm and dry.   Psychiatric: His affect is blunt. He is withdrawn and actively hallucinating. He is not combative. He expresses homicidal ideation. He expresses no suicidal ideation.         ED Course   Procedures  Labs Reviewed   DRUG SCREEN, URINE (BEAKER) - Abnormal; Notable for the following components:       Result Value    Cocaine, Urine Positive (*)     All other components within normal limits    Narrative:     Cut off concentrations:    Amphetamines - 1000 ng/ml  Barbiturates - 200 ng/ml  Benzodiazepine - 200 ng/ml  Cannabinoids (THC) - 50 ng/ml  Cocaine - 300 ng/ml  Fentanyl - 1.0 ng/ml  MDMA - 500 ng/ml  Opiates - 300 ng/ml   Phencyclidine (PCP) - 25 ng/ml    Specimen submitted for drug analysis and tested for pH and specific gravity in order to evaluate sample integrity. Suspect tampering if specific gravity is <1.003 and/or pH is not within the range of 4.5 - 8.0  False negatives may result form substances such as bleach added to urine.  False positives may result for the presence of a substance with similar chemical structure to the drug or its metabolite.    This test provides only a PRELIMINARY analytical test result. A more specific alternate chemical method must be used in order to obtain a confirmed analytical result. Gas chromatography/mass  spectrometry (GC/MS) is the preferred confirmatory method. Other chemical confirmation methods are available. Clinical consideration and professional judgement should be applied to any drug of abuse test result, particularly when preliminary positive results are used.    Positive results will be confirmed only at the physicians request. Unconfirmed screening results are to be used only for medical purposes (treatment).        CBC WITH DIFFERENTIAL - Abnormal; Notable for the following components:    RBC 4.01 (*)     Hgb 12.2 (*)     Hct 37.9 (*)     MCV 94.5 (*)     MCHC 32.2 (*)     MPV 11.9 (*)     All other components within normal limits   TSH - Normal   URINALYSIS, REFLEX TO URINE CULTURE - Normal   ALCOHOL,MEDICAL (ETHANOL) - Normal   SARS-COV-2 RNA AMPLIFICATION, QUAL - Normal    Narrative:     The IDNOW COVID-19 assay is a rapid molecular in vitro diagnostic test utilizing an isothermal nucleic acid amplification technology intended for the qualitative detection of nucleic acid from the SARS-CoV-2 viral RNA in direct nasal, nasopharyngeal or throat swabs from individuals who are suspected of COVID-19 by their healthcare provider.   CBC W/ AUTO DIFFERENTIAL    Narrative:     The following orders were created for panel order CBC auto differential.  Procedure                               Abnormality         Status                     ---------                               -----------         ------                     CBC with Differential[[id number]]       Abnormal            Final result                 Please view results for these tests on the individual orders.   COMPREHENSIVE METABOLIC PANEL          Imaging Results    None          Medications - No data to display  Medical Decision Making  Amount and/or Complexity of Data Reviewed  Labs: ordered.      Additional MDM:   Differential Diagnosis:   Other: The following diagnoses were also considered and will be evaluated: Depression, Homicidal Ideations,  Schizophrenia, Substance abuse.      APC / Resident Notes:   53 y/o male with PMH of schizophrenia and bipolar disorder presenting for visual and auditory hallucinations in addition to homicidal ideations. Patient is noncompliant with his medications. He has a history of multiple psychiatric hospitalizations. Will place under [facility] for inpatient psychiatric placement.   Vitals are stable. UDS positive for cocaine. Labs otherwise insignificant. Patient is medically cleared for psychiatric placement.                                Clinical Impression:  Final diagnoses:  [R45.850] Homicidal ideation (Primary)  [F20.9] Schizophrenia, unspecified type  [R44.3] Hallucinations                 [facility] [place] Deborah, DO  Resident  07/12/24 2240

## 2024-07-12 NOTE — PLAN OF CARE
Problem: Adult Behavioral Health Plan of Care  Goal: Plan of Care Review  Outcome: Not Progressing  Flowsheets (Taken 7/12/2024 1117)  Patient Agreement with Plan of Care: agrees  Plan of Care Reviewed With: patient  Goal: Patient-Specific Goal (Individualization)  Outcome: Not Progressing  Flowsheets (Taken 7/12/2024 1117)  Patient Personal Strengths: independent living skills  Patient Vulnerabilities: substance abuse/addiction  Goal: Adheres to Safety Considerations for Self and Others  Outcome: Not Progressing  Flowsheets (Taken 7/12/2024 1117)  Adheres to Safety Considerations for Self and Others: unable to achieve outcome  Intervention: Develop and Maintain Individualized Safety Plan  Flowsheets (Taken 7/12/2024 1117)  Safety Measures: safety rounds completed  Goal: Absence of New-Onset Illness or Injury  Outcome: Not Progressing  Intervention: Identify and Manage Fall Risk  Flowsheets (Taken 7/12/2024 1117)  Safety Measures: safety rounds completed  Intervention: Prevent VTE (Venous Thromboembolism)  Flowsheets (Taken 7/12/2024 1117)  VTE Prevention/Management: fluids promoted  Intervention: Prevent Infection  Flowsheets (Taken 7/12/2024 1117)  Infection Prevention: hand hygiene promoted  Goal: Optimized Coping Skills in Response to Life Stressors  Outcome: Not Progressing  Flowsheets (Taken 7/12/2024 1117)  Optimized Coping Skills in Response to Life Stressors: unable to achieve outcome  Intervention: Promote Effective Coping Strategies  Flowsheets (Taken 7/12/2024 1117)  Supportive Measures: self-care encouraged  Goal: Develops/Participates in Therapeutic [place] to Support Successful Transition  Outcome: Not Progressing  Flowsheets (Taken 7/12/2024 1117)  Develops/Participates in Therapeutic [place] to Support Successful Transition: unable to achieve outcome  Intervention: Foster Therapeutic [place]  Flowsheets (Taken 7/12/2024 1117)  Trust Relationship/Rapport: care explained  Goal: Rounds/Family  Conference  Outcome: Not Progressing  Flowsheets (Taken 7/12/2024 1117)  Participants:   milieu/psych techs   nursing     Problem: Psychotic Signs/Symptoms  Goal: Improved Behavioral Control (Psychotic Signs/Symptoms)  Outcome: Not Progressing  Flowsheets (Taken 7/12/2024 1117)  Mutually Determined Action Steps (Improved Behavioral Control): identifies symptoms triggers  Intervention: Manage Behavior  Flowsheets (Taken 7/12/2024 1117)  De-Escalation Techniques: quiet time facilitated  Goal: Optimal Cognitive Function (Psychotic Signs/Symptoms)  Outcome: Not Progressing  Flowsheets (Taken 7/12/2024 1117)  Mutually Determined Action Steps (Optimal Cognitive Function): participates in attention training  Intervention: Support and Promote Cognitive Ability  Flowsheets (Taken 7/12/2024 1117)  Trust Relationship/Rapport: care explained  Goal: Increased Participation and Engagement (Psychotic Signs/Symptoms)  Outcome: Not Progressing  Flowsheets (Taken 7/12/2024 1117)  Mutually Determined Action Steps (Increased Participation and Engagement): identifies symptoms triggers  Intervention: Facilitate Participation and Engagement  Flowsheets (Taken 7/12/2024 1117)  Supportive Measures: self-care encouraged  Diversional Activity: television  Goal: Improved Mood Symptoms (Psychotic Signs/Symptoms)  Outcome: Not Progressing  Flowsheets (Taken 7/12/2024 1117)  Mutually Determined Action Steps (Improved Mood Symptoms): acknowledges progress  Intervention: Optimize Emotion and Mood  Flowsheets (Taken 7/12/2024 1117)  Supportive Measures: self-care encouraged  Diversional Activity: television  Goal: Improved Psychomotor Symptoms (Psychotic Signs/Symptoms)  Outcome: Not Progressing  Flowsheets (Taken 7/12/2024 1117)  Mutually Determined Action Steps (Improved Psychomotor Symptoms): exhibits decrease in agitation  Intervention: Manage Psychomotor Movement  Flowsheets (Taken 7/12/2024 1117)  Activity (Behavioral Health): up ad  ismael  Patient Performed Hygiene: teeth brushed  Diversional Activity: television  Goal: Decreased Sensory Symptoms (Psychotic Signs/Symptoms)  Outcome: Not Progressing  Flowsheets (Taken 7/12/2024 1117)  Mutually Determined Action Steps (Decreased Sensory Symptoms): adheres to medication regimen  Intervention: Minimize and Manage Sensory Impairment  Flowsheets (Taken 7/12/2024 1117)  Sensory Stimulation Regulation: quiet environment promoted  Goal: Improved Sleep (Psychotic Signs/Symptoms)  Outcome: Not Progressing  Flowsheets (Taken 7/12/2024 1117)  Mutually Determined Action Steps (Improved Sleep): sleeps 4-6 hours at night  Intervention: Promote Healthy Sleep Hygiene  Flowsheets (Taken 7/12/2024 1117)  Sleep Hygiene Promotion: regular sleep pattern promoted  Goal: Enhanced Social, Occupational or Functional Skills (Psychotic Signs/Symptoms)  Outcome: Not Progressing  Flowsheets (Taken 7/12/2024 1117)  Mutually Determined Action Steps (Enhanced Social, Occupational or Functional Skills): participates in social skills training  Intervention: Promote Social, Occupational and Functional Ability  Flowsheets (Taken 7/12/2024 1117)  Trust Relationship/Rapport: care explained  Social Functional Ability Promotion: autonomy promoted     Problem: Excessive Substance Use  Goal: Optimized Energy Level (Excessive Substance Use)  Outcome: Not Progressing  Flowsheets (Taken 7/12/2024 1117)  Mutually Determined Action Steps (Optimized Energy Level): grooms self without prompting  Intervention: Optimize Energy Level  Flowsheets (Taken 7/12/2024 1117)  Activity (Behavioral Health): up ad ismael  Patient Performed Hygiene: teeth brushed  Diversional Activity: television  Goal: Improved Behavioral Control (Excessive Substance Use)  Outcome: Not Progressing  Flowsheets (Taken 7/12/2024 1117)  Mutually Determined Action Steps (Improved Behavioral Control): identifies major stressors  Intervention: Promote Behavior and Impulse  Control  Flowsheets (Taken 7/12/2024 1117)  Behavior Management: behavioral plan developed  Goal: Increased Participation and Engagement (Excessive Substance Use)  Outcome: Not Progressing  Flowsheets (Taken 7/12/2024 1117)  Mutually Determined Action Steps (Increased Participation and Engagement): discusses ongoing recovery plan  Intervention: Facilitate Participation and Engagement  Flowsheets (Taken 7/12/2024 1117)  Supportive Measures: self-care encouraged  Diversional Activity: television  Goal: Improved Physiologic Symptoms (Excessive Substance Use)  Outcome: Not Progressing  Flowsheets (Taken 7/12/2024 1117)  Mutually Determined Action Steps (Improved Physiologic Symptoms): discusses use pattern  Intervention: Optimize Physiologic Function  Flowsheets (Taken 7/12/2024 1117)  Oral Nutrition Promotion: rest periods promoted  Nutrition Interventions: food preferences provided  Goal: Enhanced Social, Occupational or Functional Skills (Excessive Substance Use)  Outcome: Not Progressing  Flowsheets (Taken 7/12/2024 1117)  Mutually Determined Action Steps (Enhanced Social, Occupational or Functional Skills): participates in social skills training  Intervention: Promote Social, Occupational and Functional Ability  Flowsheets (Taken 7/12/2024 1117)  Trust Relationship/Rapport: care explained  Social Functional Ability Promotion: autonomy promoted

## 2024-07-12 NOTE — NURSING
"Admission Note:    Kevin Dukes is a 54 y.o. male, : 1970, MRN: [id number], admitted on 2024 to [facility] ([facility]) for Juan M Anand MD with a diagnosis of Psychosis [F29]. Patient admitted on a status of Physician Emergency Certificate (PEC). Kevin reports no known food or drug allergies.    Patient demonstrated an affect that was flat and anxious. Patient demonstrated mood during assessment that was anxious. Patient had an appearance that was disheveled.  Patient denies suicidal ideation. Patient denies suicide plan. Patient endorses hallucinations.    Kevin's  height is 6' 1" (1.854 m) and weight is 80.3 kg (177 lb). His temperature is 98.4 °F (36.9 °C). His blood pressure is 135/85 and his pulse is 52 (abnormal). His respiration is 20.     Kevin's last BM was noted on: 24.    Metal detector screening performed via security personnel. The result of the scan was no contraband found. Head-to-toe physical assessment completed with the following findings: no wounds found upon body screen. A full skin assessment was performed. Blades skin appeared warm, dry, and intact.  Kevin was oriented to unit, staff, peers, and room. Patient belongings/valuables stored in locked intake room cabinet and changes of clothing provided to patient. Kevin was placed on Q 15 min observations.       "

## 2024-07-12 NOTE — ED TRIAGE NOTES
Pt presents reporting auditory hallucinations instructing him to harm others. Denies SI. Pt also reports visual hallucinations. Pt reports has been out of his psyc meds for an uknown amount of time. At this time pt is calm but guarded in triage. Exhibiting flat affect.

## 2024-07-13 PROCEDURE — 11400000 HC PSYCH PRIVATE ROOM

## 2024-07-13 PROCEDURE — 25000003 PHARM REV CODE 250: Performed by: NURSE PRACTITIONER

## 2024-07-13 RX ORDER — OLANZAPINE 5 MG/1
5 TABLET ORAL NIGHTLY
Status: DISCONTINUED | OUTPATIENT
Start: 2024-07-13 | End: 2024-07-16

## 2024-07-13 RX ORDER — FLUOXETINE HYDROCHLORIDE 20 MG/1
20 CAPSULE ORAL DAILY
Status: DISCONTINUED | OUTPATIENT
Start: 2024-07-13 | End: 2024-07-18 | Stop reason: HOSPADM

## 2024-07-13 RX ADMIN — OLANZAPINE 5 MG: 5 TABLET, FILM COATED ORAL at 08:07

## 2024-07-13 RX ADMIN — FLUOXETINE 20 MG: 20 CAPSULE ORAL at 01:07

## 2024-07-13 NOTE — H&P
"7/13/2024  Kevin Dukes   1970   [id number]            Psychiatry Inpatient Admission Note    Date of Admission: 7/12/2024 10:57 AM    Current Legal Status: Physician's Emergency Certificate    Chief Complaint: "I was hearing voices and shit"    SUBJECTIVE:   History of Present Illness:   Kevin Dukes is a 54 y.o. male placed under a PEC at [facility] for auditory hallucinations and homicidal ideations. Patient reports that he was hearing voices that were telling him to hurt/kill others. When asked who the voices were instructing him to hurt/kill, pt stated "everyone". He also reported visual hallucinations of seeing shadows. Once admitted to [facility], pt reports that he is homeless and he was "hot and needed a place to stay". However, he adamantly denies this and reports that he lives at [address] [place] [address]. He admits to being noncompliant with his prescribed psychotropic medications for an unknown amount of time. He states, "This been going on for a while, I be hearing voices and tripping every now and again. I just need to get back on the medicine. That's all". He reports that his mood today is "depressed". Very flat and guarded. He denies auditory hallucinations, however, he endorses visual hallucinations today - "I see shadows and figures". He is not RTIS and there is no evidence of psychosis present. He denies suicidal or homicidal ideations today. Will admit to inpatient unit for medication management and monitor for safety and security.      UDS: (+) Cocaine  ETOH: (-)      Past Psychiatric History:   Previous Psychiatric Hospitalizations: Yes - "A lot of different ones all over the place"  Previous Medication Trials: Wellbutrin, Zyprexa, Trileptal, and Effexor  Previous Suicide Attempts: Denies   Outpatient psychiatrist: [facility] "sometimes when I make it"    Current Medications:   Home Psychiatric Meds: "No, I don't think I so; I can't remember".   Wellbutrin XL 150mg PO QD, Zyprexa 10mg PO " "[facility]    Past Medical/Surgical History:   Past Medical History:   Diagnosis Date    Bipolar disorder, unspecified     History of psychiatric hospitalization     Hx of psychiatric care     Arcelia     Psychiatric problem     Schizophrenia, unspecified     Substance abuse      History reviewed. No pertinent surgical history.      Family Psychiatric History:   Denies    Allergies:   Review of patient's allergies indicates:  No Known Allergies    Substance Abuse History:   Tobacco: Denies  Alcohol: Denies  Illicit Substances: Cocaine - "Just a little bit a week, not that much"  Treatment: Yes - numerous        Scheduled Meds:    nicotine  1 patch Transdermal Daily      PRN Meds:   Current Facility-Administered Medications:     acetaminophen, 650 mg, Oral, Q6H PRN    aluminum-magnesium hydroxide-simethicone, 30 mL, Oral, Q6H PRN    cloNIDine, 0.1 mg, Oral, Q8H PRN    cloNIDine, 0.2 mg, Oral, Q8H PRN    haloperidoL, 10 mg, Oral, Q4H PRN **AND** diphenhydrAMINE, 50 mg, Oral, Q4H PRN **AND** LORazepam, 2 mg, Oral, Q4H PRN **AND** haloperidol lactate, 10 mg, Intramuscular, Q4H PRN **AND** diphenhydrAMINE, 50 mg, Intramuscular, Q4H PRN **AND** lorazepam, 2 mg, Intramuscular, Q4H PRN    hydrOXYzine HCL, 50 mg, Oral, Q4H PRN    magnesium hydroxide 400 mg/5 ml, 30 mL, Oral, Daily PRN    ondansetron, 4 mg, Oral, Q6H PRN    traZODone, 100 mg, Oral, Nightly PRN   Psychotherapeutics (From admission, onward)      Start     Stop Route Frequency Ordered    07/12/24 1123  traZODone tablet 100 mg         -- Oral Nightly PRN 07/12/24 1104    07/12/24 1119  haloperidoL tablet 10 mg  (Med - Acute  Behavioral Management)        Placed in "And" Linked Group    -- Oral Every 4 hours PRN 07/12/24 1104    07/12/24 1119  LORazepam tablet 2 mg  (Med - Acute  Behavioral Management)        Placed in "And" Linked Group    -- Oral Every 4 hours PRN 07/12/24 1104    07/12/24 1119  haloperidol lactate injection 10 mg  (Med - Acute  Behavioral Management)    " "    Placed in "And" Linked Group    -- IM Every 4 hours PRN 07/12/24 1104    07/12/24 1119  LORazepam injection 2 mg  (Med - Acute  Behavioral Management)        Placed in "And" Linked Group    -- IM Every 4 hours PRN 07/12/24 1104              Social History:  Housing Status: Lives Alone; denies homelessness  Relationship Status/Sexual Orientation: Single   Children: 4  Education: Some College  Employment Status/Info: [employer] -     history: Denies  History of physical/sexual abuse: Denies   Access to gun: Denies       Legal History:   Past Charges/Incarcerations: Denies   Pending charges: Denies      OBJECTIVE:   Medical Review Of Systems:  Pertinent items are noted in HPI.    Vitals   Vitals:    07/12/24 1930   BP: (!) 161/97   Pulse: 60   Resp: 18   Temp: 97.9 °F (36.6 °C)        Labs/Imaging/Studies:   Recent Results (from the past 48 hour(s))   Urinalysis, Reflex to Urine Culture    Collection Time: 07/12/24  5:22 AM    Specimen: Urine   Result Value Ref Range    Color, UA Light-Yellow Yellow, Light-Yellow, Colorless, Straw, Dark-Yellow    Appearance, UA Clear Clear    Specific Gravity, UA 1.019 1.005 - 1.030    pH, UA 5.5 5.0 - 8.5    Protein, UA Negative Negative    Glucose, UA Normal Negative, Normal    Ketones, UA Negative Negative    Blood, UA Negative Negative    Bilirubin, UA Negative Negative    Urobilinogen, UA Normal 0.2, 1.0, Normal    Nitrites, UA Negative Negative    Leukocyte Esterase, UA Negative Negative    RBC, UA 0-5 None Seen, 0-2, 3-5, 0-5 /HPF    WBC, UA 0-5 None Seen, 0-2, 3-5, 0-5 /HPF    Bacteria, UA None Seen None Seen, Trace /HPF    Squamous Epithelial Cells, UA Trace None Seen /HPF   Drug Screen, Urine    Collection Time: 07/12/24  5:22 AM   Result Value Ref Range    Amphetamines, Urine Negative Negative    Barbiturates, Urine Negative Negative    Benzodiazepine, Urine Negative Negative    Cannabinoids, Urine Negative Negative    Cocaine, Urine Positive (A) " Negative    Fentanyl, Urine Negative Negative    MDMA, Urine Negative Negative    Opiates, Urine Negative Negative    Phencyclidine, Urine Negative Negative    pH, Urine 5.5 3.0 - 11.0    Specific Gravity, Urine Auto 1.019 1.001 - 1.035   COVID-19 Rapid Screening    Collection Time: 07/12/24  5:23 AM   Result Value Ref Range    SARS COV-2 Molecular Negative Negative   Comprehensive metabolic panel    Collection Time: 07/12/24  6:16 AM   Result Value Ref Range    Sodium 137 136 - 145 mmol/L    Potassium 4.9 3.5 - 5.1 mmol/L    Chloride 105 98 - 107 mmol/L    CO2 26 22 - 29 mmol/L    Glucose 83 74 - 100 mg/dL    Blood Urea Nitrogen 15.8 8.4 - 25.7 mg/dL    Creatinine 1.16 0.73 - 1.18 mg/dL    Calcium 8.9 8.4 - 10.2 mg/dL    Protein Total 6.8 6.4 - 8.3 gm/dL    Albumin 3.5 3.5 - 5.0 g/dL    Globulin 3.3 2.4 - 3.5 gm/dL    Albumin/Globulin Ratio 1.1 1.1 - 2.0 ratio    Bilirubin Total 0.3 <=1.5 mg/dL    ALP 72 40 - 150 unit/L    ALT 18 0 - 55 unit/L    AST 21 5 - 34 unit/L    eGFR >60 mL/min/1.73/m2    Anion Gap 6.0 mEq/L    BUN/Creatinine Ratio 14    TSH    Collection Time: 07/12/24  6:16 AM   Result Value Ref Range    TSH 2.234 0.350 - 4.940 uIU/mL   Ethanol    Collection Time: 07/12/24  6:16 AM   Result Value Ref Range    Ethanol Level <10.0 <=10.0 mg/dL   CBC with Differential    Collection Time: 07/12/24  6:16 AM   Result Value Ref Range    WBC 5.58 4.50 - 11.50 x10(3)/mcL    RBC 4.01 (L) 4.70 - 6.10 x10(6)/mcL    Hgb 12.2 (L) 14.0 - 18.0 g/dL    Hct 37.9 (L) 42.0 - 52.0 %    MCV 94.5 (H) 80.0 - 94.0 fL    MCH 30.4 27.0 - 31.0 pg    MCHC 32.2 (L) 33.0 - 36.0 g/dL    RDW 14.5 11.5 - 17.0 %    Platelet 216 130 - 400 x10(3)/mcL    MPV 11.9 (H) 7.4 - 10.4 fL    Neut % 55.3 %    Lymph % 28.7 %    Mono % 10.8 %    Eos % 3.9 %    Basophil % 0.9 %    Lymph # 1.60 0.6 - 4.6 x10(3)/mcL    Neut # 3.09 2.1 - 9.2 x10(3)/mcL    Mono # 0.60 0.1 - 1.3 x10(3)/mcL    Eos # 0.22 0 - 0.9 x10(3)/mcL    Baso # 0.05 <=0.2 x10(3)/mcL     "IG# 0.02 0 - 0.04 x10(3)/mcL    IG% 0.4 %    NRBC% 0.0 %      No results found for: "PHENYTOIN", "PHENOBARB", "VALPROATE", "CBMZ"        Psychiatric Mental Status Exam:  General Appearance: unremarkable, appears stated age  Arousal: alert  Behavior: cooperative  Movements and Motor Activity: no abnormal involuntary movements noted; no tics, no tremors, no akathisia, no dystonia, no evidence of tardive dyskinesia; no psychomotor agitation or retardation  Orientation: oriented to person, place, time, and situation  Speech: soft  Mood: Depressed, Anxious, and Guarded  Affect: mood-congruent  Thought Process: linear  Associations: intact  Thought Content and Perceptions: no suicidal ideation, no homicidal ideation, + auditory hallucinations, + visual hallucinations, no paranoid ideation  Recent and Remote Memory: intact; per interview/observation with patient  Attention and Concentration: intact; per interview/observation with patient  Fund of Knowledge: intact; based on history, vocabulary, fund of knowledge, syntax, grammar, and content  Insight: questionable; based on understanding of severity of illness and HPI  Judgment: questionable; based on patient's behavior and HPI      Patient Strengths:  Access to care, Able to verbalize needs, and Stable physical health      Patient Liabilities:  Medication non-compliance, Substance use, and Depression      Discharge Criteria:  Improved mood, Improved thought process, and Medication compliance      Reason for Admission:  The patient poses a significant and immediate danger to self., The patient is gravely disabled due to a psychiatric condition., The psychiatric disorder requires intensive treatment that necessitates 24 hour observation and care., The patient presents with psychiatric symptoms of sufficient severity to bring about significant or profound impairment of day to day psychological, social, vocational, and/or educational functioning., To stabilize an acute " exacerbation of a severe persistent mental disorder., To stabilize the decompensation of a mental disorder that severely interferes with patient's functioning., The patient has failed to make sufficient clinical gains within a traditional outpatient setting and severity of presenting symptoms requires inpatient treatment., and The patient can demonstrate a reasonable expectation of improvement in his/her disorder or condition as a result of active treatment being provided.    ASSESSMENT/PLAN:   Diagnoses:  Unspecified Mood Disorder (F39)  Cocaine Use Disorder (F14.10)    Past Medical History:   Diagnosis Date    Bipolar disorder, unspecified     History of psychiatric hospitalization     Hx of psychiatric care     Arcelia     Psychiatric problem     Schizophrenia, unspecified     Substance abuse           Problem lists and Management Plans:  Admit to [facility]    Mood   Start Prozac 20mg Daily  Start Zyprexa 5mg QHS    Cocaine Use Disorder   Attend individual and group psychotherapy sessions.      -Will attempt to obtain outside psychiatric records if available  - to assist with aftercare planning and collateral  -Continue inpatient treatment as evidenced by hallucinations, danger to self, gravely disabled, suicidal ideation, and homicidal ideation      Estimated length of stay: 5-7 Days    Estimated Disposition: Home and Substance treatment program    Estimated Follow-up: Outpatient medication management and Substance treatment program      On this date, I have reviewed the medical history and Nursing Assessment, as well as records from referral source.  I have evaluated the mental status of the above named person and concur with the findings of all assessments.  I have provided medical direction for the development of the Treatment Plan.    I conclude that this patient meets admission criteria for inpatient treatment.  I certify that this patient poses a danger to self or others, or would otherwise be  considered gravely disabled based on this assessment and/or provided collateral information.     I have provided medical direction for the development of the Treatment plan.  These services will be provided while this patient is under my care and will be based on an individualized plan of care.  The patient can demonstrate a reasonable expectation of improvement in his/her disorder as a result of the active treatment being provided.      Vazquez Love, PMP-BC

## 2024-07-13 NOTE — PLAN OF CARE
Problem: Adult Behavioral Health Plan of Care  Goal: Plan of Care Review  Outcome: Not Progressing  Goal: Patient-Specific Goal (Individualization)  Outcome: Not Progressing  Goal: Adheres to Safety Considerations for Self and Others  Outcome: Not Progressing  Goal: Absence of New-Onset Illness or Injury  Outcome: Not Progressing  Goal: Optimized Coping Skills in Response to Life Stressors  Outcome: Not Progressing  Goal: Develops/Participates in Therapeutic [place] to Support Successful Transition  Outcome: Not Progressing  Goal: Rounds/Family Conference  Outcome: Not Progressing     Problem: Psychotic Signs/Symptoms  Goal: Improved Behavioral Control (Psychotic Signs/Symptoms)  Outcome: Not Progressing  Goal: Optimal Cognitive Function (Psychotic Signs/Symptoms)  Outcome: Not Progressing  Goal: Increased Participation and Engagement (Psychotic Signs/Symptoms)  Outcome: Not Progressing  Goal: Improved Mood Symptoms (Psychotic Signs/Symptoms)  Outcome: Not Progressing  Goal: Improved Psychomotor Symptoms (Psychotic Signs/Symptoms)  Outcome: Not Progressing  Goal: Decreased Sensory Symptoms (Psychotic Signs/Symptoms)  Outcome: Not Progressing  Goal: Improved Sleep (Psychotic Signs/Symptoms)  Outcome: Not Progressing  Goal: Enhanced Social, Occupational or Functional Skills (Psychotic Signs/Symptoms)  Outcome: Not Progressing     Problem: Excessive Substance Use  Goal: Optimized Energy Level (Excessive Substance Use)  Outcome: Not Progressing  Goal: Improved Behavioral Control (Excessive Substance Use)  Outcome: Not Progressing  Goal: Increased Participation and Engagement (Excessive Substance Use)  Outcome: Not Progressing  Goal: Improved Physiologic Symptoms (Excessive Substance Use)  Outcome: Not Progressing  Goal: Enhanced Social, Occupational or Functional Skills (Excessive Substance Use)  Outcome: Not Progressing     Problem: Suicide Risk  Goal: Absence of Self-Harm  Outcome: Not Progressing

## 2024-07-13 NOTE — PLAN OF CARE
"Behavioral Health Unit  Psychosocial History and Assessment  Progress Note      Patient Name: Kevin Dukes YOB: 1970 SW: [place] [place] 7[place]12/2024    Chief Complaint: "voices"    Consent:     Did the patient consent for an interview with the ? Yes    Did the patient consent for the  to contact family/friend/caregiver?   No    Did the patient give consent for the  to inform family/friend/caregiver of his/her whereabouts or to discuss discharge planning? No    Source of Information: Face to face with patient and Chart review    Is information obtained from interviews considered reliable?   yes    Reason for Admission:     There are no hospital problems to display for this patient.      History of Present Illness - (Patient Perception):   Pt stated the voices started getting worse yesterday and they are mumbling.       Present biopsychosocial functioning: calm, cooperative     Past biopsychosocial functioning: Pt has hx of substance use.     Family and Marital/Relationship History:     Significant Other/Partner Relationships:  Single:  4 children    Family Relationships: Intact      Childhood History:     Where was patient raised? [place], [place]    Who raised the patient? Mother and father       How does patient describe their childhood? "Good"      Who is patient's primary support person? self      Culture and Gnosticism:     Gnosticism: none noted     How strong of a role does Druze and spirituality play in patient's life? Minimal     Scientology or spiritual concerns regarding treatment: not applicable     History of Abuse:   History of Abuse: Denies      Outcome: n/a    Psychiatric and Medical History:     History of psychiatric illness or treatment: prior inpatient treatment    Medical history:   Past Medical History:   Diagnosis Date    Bipolar disorder, unspecified     History of psychiatric hospitalization     Hx of psychiatric care     " Arcelia     Psychiatric problem     Schizophrenia, unspecified     Substance abuse        Substance Abuse History:     Alcohol - (Patient Perspective): Pt denied use.   Social History     Substance and Sexual Activity   Alcohol Use Not Currently       Drugs - (Patient Perspective): Pt denied use and stated he has a hx of substance use but later stated he use here and there. Pt uds is positive for cocaine.   Social History     Substance and Sexual Activity   Drug Use Yes    Types: Cocaine, Methamphetamines       Education:     Currently Enrolled? No  High School (9-12) or GED- Pt stated he has some college hx for criminal justice.     Special Education? No    Interested in Completing Education/GED: No    Employment and Financial:     Currently employed? Employed: Current Occupation: [employer]    Source of Income: salary    Able to afford basic needs (food, shelter, utilities)? Yes    Who manages finances/personal affairs? self      Service:     ? no    Combat Service? No     Community Resources:     Describe present use of community resources: inpatient mh      Identify previously used community resources   (Include previous mental health treatment - outpatient and inpatient): inpatient mh    Environmental:     Current living situation:Lives alone    Social Evaluation:     Patient Assets: Capable of independent living, Work skills, Communicable skills, and Financial means    Patient Limitations: potential for relapse, limited insight, poor coping skills, med non-compliance    High risk psychosocial issues that may impact discharge planning:   None noted at this time     Recommendations:     Anticipated discharge plan:   [place]- [address] [place], [place] [address], [place]    Outpatient follow-up    High risk issues requiring early treatment planning and immediate intervention: none noted at this time     Community resources needed for discharge planning:  aftercare treatment sources    Anticipated social work role(s)  in treatment and discharge planning: Sw will  and offer advice along with group therapy, ind as necessary and dc planning.   07/12/24 9000   Initial Information   Source of Information patient   Reason for Admission psychosis, HI   Patient Aware of Diagnosis yes   Arrived From emergency department   Current or Previous  Service none   Legal Status    Type of Admission Involuntary   Type of Involuntary Admission PEC   Spiritual Beliefs   Spiritual, Cultural Beliefs, Confucianist Practices, Values that Affect Care no   Substance Use/Withdrawal   Substance Use Denies use;Current, used prior to admission   Additional Tobacco Use   How many cigarettes do you typically have per day? 0   Abuse Screen (yes response referral indicated)   Feels Unsafe at Home or Work/School no   Feels Threatened by Someone no   Does anyone try to keep you from having contact with others or doing things outside your home? no   Physical Signs of Abuse Present no   Abuse Details   Physical Abuse No   Sexual Abuse No   Emotional Abuse No   AUDIT-C (Alcohol Use Disorders ID Test)   Alcohol Use In Past Year 0-->never   Alcohol Amount Per Day In Past Year 0-->none   More Than 6 Drinks On One Occasion In Past Year 0-->never   Total Audit C Score 0

## 2024-07-13 NOTE — PLAN OF CARE
Problem: Adult Behavioral Health Plan of Care  Goal: Plan of Care Review  Outcome: Progressing  Flowsheets (Taken 7/13/2024 1310)  Patient Agreement with Plan of Care: agrees  Plan of Care Reviewed With: patient  Goal: Patient-Specific Goal (Individualization)  Outcome: Progressing  Flowsheets (Taken 7/13/2024 1310)  Patient Personal Strengths: independent living skills  Patient Vulnerabilities: substance abuse/addiction  Goal: Adheres to Safety Considerations for Self and Others  Outcome: Progressing  Flowsheets (Taken 7/13/2024 1310)  Adheres to Safety Considerations for Self and Others: making progress toward outcome  Intervention: Develop and Maintain Individualized Safety Plan  Flowsheets (Taken 7/13/2024 1310)  Safety Measures: safety rounds completed  Goal: Absence of New-Onset Illness or Injury  Outcome: Progressing  Intervention: Identify and Manage Fall Risk  Flowsheets (Taken 7/13/2024 1310)  Safety Measures: safety rounds completed  Intervention: Prevent VTE (Venous Thromboembolism)  Flowsheets (Taken 7/13/2024 1310)  VTE Prevention/Management: fluids promoted  Intervention: Prevent Infection  Flowsheets (Taken 7/13/2024 1310)  Infection Prevention: hand hygiene promoted  Goal: Optimized Coping Skills in Response to Life Stressors  Outcome: Progressing  Flowsheets (Taken 7/13/2024 1310)  Optimized Coping Skills in Response to Life Stressors: making progress toward outcome  Intervention: Promote Effective Coping Strategies  Flowsheets (Taken 7/13/2024 1310)  Supportive Measures: self-care encouraged  Goal: Develops/Participates in Therapeutic [place] to Support Successful Transition  Outcome: Progressing  Flowsheets (Taken 7/13/2024 1310)  Develops/Participates in Therapeutic [place] to Support Successful Transition: making progress toward outcome  Intervention: Foster Therapeutic [place]  Flowsheets (Taken 7/13/2024 1310)  Trust Relationship/Rapport: care explained  Goal: Rounds/Family  Conference  Outcome: Progressing  Flowsheets (Taken 7/13/2024 1310)  Participants:   milieu/psych techs   nursing     Problem: Psychotic Signs/Symptoms  Goal: Improved Behavioral Control (Psychotic Signs/Symptoms)  Outcome: Progressing  Flowsheets (Taken 7/13/2024 1310)  Mutually Determined Action Steps (Improved Behavioral Control): identifies symptoms triggers  Intervention: Manage Behavior  Flowsheets (Taken 7/13/2024 1310)  De-Escalation Techniques: quiet time facilitated  Goal: Optimal Cognitive Function (Psychotic Signs/Symptoms)  Outcome: Progressing  Flowsheets (Taken 7/13/2024 1310)  Mutually Determined Action Steps (Optimal Cognitive Function): participates in attention training  Intervention: Support and Promote Cognitive Ability  Flowsheets (Taken 7/13/2024 1310)  Trust Relationship/Rapport: care explained  Goal: Increased Participation and Engagement (Psychotic Signs/Symptoms)  Outcome: Progressing  Flowsheets (Taken 7/13/2024 1310)  Mutually Determined Action Steps (Increased Participation and Engagement): identifies symptoms triggers  Intervention: Facilitate Participation and Engagement  Flowsheets (Taken 7/13/2024 1310)  Supportive Measures: self-care encouraged  Diversional Activity: television  Goal: Improved Mood Symptoms (Psychotic Signs/Symptoms)  Outcome: Progressing  Flowsheets (Taken 7/13/2024 1310)  Mutually Determined Action Steps (Improved Mood Symptoms): acknowledges progress  Intervention: Optimize Emotion and Mood  Flowsheets (Taken 7/13/2024 1310)  Supportive Measures: self-care encouraged  Diversional Activity: television  Goal: Improved Psychomotor Symptoms (Psychotic Signs/Symptoms)  Outcome: Progressing  Flowsheets (Taken 7/13/2024 1310)  Mutually Determined Action Steps (Improved Psychomotor Symptoms): exhibits decrease in agitation  Intervention: Manage Psychomotor Movement  Flowsheets (Taken 7/13/2024 1310)  Activity (Behavioral Health): up ad ismael  Patient Performed Hygiene:  teeth brushed  Diversional Activity: television  Goal: Decreased Sensory Symptoms (Psychotic Signs/Symptoms)  Outcome: Progressing  Flowsheets (Taken 7/13/2024 1310)  Mutually Determined Action Steps (Decreased Sensory Symptoms): adheres to medication regimen  Intervention: Minimize and Manage Sensory Impairment  Flowsheets (Taken 7/13/2024 1310)  Sensory Stimulation Regulation: quiet environment promoted  Goal: Improved Sleep (Psychotic Signs/Symptoms)  Outcome: Progressing  Flowsheets (Taken 7/13/2024 1310)  Mutually Determined Action Steps (Improved Sleep): sleeps 4-6 hours at night  Intervention: Promote Healthy Sleep Hygiene  Flowsheets (Taken 7/13/2024 1310)  Sleep Hygiene Promotion: regular sleep pattern promoted  Goal: Enhanced Social, Occupational or Functional Skills (Psychotic Signs/Symptoms)  Outcome: Progressing  Flowsheets (Taken 7/13/2024 1310)  Mutually Determined Action Steps (Enhanced Social, Occupational or Functional Skills): participates in social skills training  Intervention: Promote Social, Occupational and Functional Ability  Flowsheets (Taken 7/13/2024 1310)  Trust Relationship/Rapport: care explained  Social Functional Ability Promotion: autonomy promoted     Problem: Excessive Substance Use  Goal: Optimized Energy Level (Excessive Substance Use)  Outcome: Progressing  Flowsheets (Taken 7/13/2024 1310)  Mutually Determined Action Steps (Optimized Energy Level): grooms self without prompting  Intervention: Optimize Energy Level  Flowsheets (Taken 7/13/2024 1310)  Activity (Behavioral Health): up ad ismael  Patient Performed Hygiene: teeth brushed  Diversional Activity: television  Goal: Improved Behavioral Control (Excessive Substance Use)  Outcome: Progressing  Flowsheets (Taken 7/13/2024 1310)  Mutually Determined Action Steps (Improved Behavioral Control): identifies major stressors  Intervention: Promote Behavior and Impulse Control  Flowsheets (Taken 7/13/2024 1310)  Behavior Management:  behavioral plan reviewed  Goal: Increased Participation and Engagement (Excessive Substance Use)  Outcome: Progressing  Flowsheets (Taken 7/13/2024 1310)  Mutually Determined Action Steps (Increased Participation and Engagement): discusses ongoing recovery plan  Intervention: Facilitate Participation and Engagement  Flowsheets (Taken 7/13/2024 1310)  Supportive Measures: self-care encouraged  Diversional Activity: television  Goal: Improved Physiologic Symptoms (Excessive Substance Use)  Outcome: Progressing  Flowsheets (Taken 7/13/2024 1310)  Mutually Determined Action Steps (Improved Physiologic Symptoms): discusses use pattern  Intervention: Optimize Physiologic Function  Flowsheets (Taken 7/13/2024 1310)  Oral Nutrition Promotion: rest periods promoted  Nutrition Interventions: food preferences provided  Goal: Enhanced Social, Occupational or Functional Skills (Excessive Substance Use)  Outcome: Progressing  Flowsheets (Taken 7/13/2024 1310)  Mutually Determined Action Steps (Enhanced Social, Occupational or Functional Skills): participates in social skills training  Intervention: Promote Social, Occupational and Functional Ability  Flowsheets (Taken 7/13/2024 1310)  Trust Relationship/Rapport: care explained  Social Functional Ability Promotion: autonomy promoted     Problem: Suicide Risk  Goal: Absence of Self-Harm  Outcome: Progressing  Intervention: Assess Risk to Self and Maintain Safety  Flowsheets (Taken 7/13/2024 1310)  Behavior Management: behavioral plan reviewed  Self-Harm Prevention: environmental self-harm risks assessed  Intervention: Promote Psychosocial Wellbeing  Flowsheets (Taken 7/13/2024 1310)  Sleep/Rest Enhancement: regular sleep/rest pattern promoted  Supportive Measures: self-care encouraged  Family/Support System Care: self-care encouraged  Intervention: Establish Safety Plan and Continuity of Care  Flowsheets (Taken 7/13/2024 1310)  Safe Transition Promotion: access to lethal means  addressed    He is AAO X 4. Flat affect and blunted mood. Anxious. Endorses visual hallucinations AEB he can see shadows. Guarded. Suspicious. Isolated and withdrawn, but interacts with selected patients and staff. Good eye contact noted. Speech normal tone and speed. Continue plan of care and provide a safe and therapeutic environment. Continue to monitor every fifteen minutes for safety.

## 2024-07-14 PROCEDURE — 11400000 HC PSYCH PRIVATE ROOM

## 2024-07-14 PROCEDURE — 25000003 PHARM REV CODE 250: Performed by: NURSE PRACTITIONER

## 2024-07-14 RX ADMIN — FLUOXETINE 20 MG: 20 CAPSULE ORAL at 09:07

## 2024-07-14 RX ADMIN — OLANZAPINE 5 MG: 5 TABLET, FILM COATED ORAL at 08:07

## 2024-07-14 NOTE — PLAN OF CARE
Problem: Adult Behavioral Health Plan of Care  Goal: Plan of Care Review  Outcome: Not Progressing  Flowsheets (Taken 7/14/2024 0030)  Patient Agreement with Plan of Care: agrees  Plan of Care Reviewed With: patient  Goal: Patient-Specific Goal (Individualization)  Outcome: Not Progressing  Flowsheets (Taken 7/14/2024 0030)  Patient Personal Strengths: independent living skills  Patient Vulnerabilities:   substance abuse/addiction   lacks insight into illness   limited support system  Goal: Adheres to Safety Considerations for Self and Others  Outcome: Not Progressing  Flowsheets (Taken 7/14/2024 0030)  Adheres to Safety Considerations for Self and Others: making progress toward outcome  Intervention: Develop and Maintain Individualized Safety Plan  Flowsheets (Taken 7/14/2024 0030)  Safety Measures:   monitored by video   safety rounds completed  Goal: Absence of New-Onset Illness or Injury  Outcome: Not Progressing  Intervention: Identify and Manage Fall Risk  Flowsheets (Taken 7/14/2024 0030)  Safety Measures:   monitored by video   safety rounds completed  Intervention: Prevent VTE (Venous Thromboembolism)  Flowsheets (Taken 7/14/2024 0030)  VTE Prevention/Management:   ambulation promoted   fluids promoted  Intervention: Prevent Infection  Flowsheets (Taken 7/14/2024 0030)  Infection Prevention:   hand hygiene promoted   rest/sleep promoted  Goal: Optimized Coping Skills in Response to Life Stressors  Outcome: Not Progressing  Flowsheets (Taken 7/14/2024 0030)  Optimized Coping Skills in Response to Life Stressors: making progress toward outcome  Intervention: Promote Effective Coping Strategies  Flowsheets (Taken 7/14/2024 0030)  Supportive Measures:   active listening utilized   self-care encouraged   verbalization of feelings encouraged  Goal: Develops/Participates in Therapeutic [place] to Support Successful Transition  Outcome: Not Progressing  Flowsheets (Taken 7/14/2024 0030)  Develops/Participates in  Therapeutic [place] to Support Successful Transition: making progress toward outcome  Intervention: Foster Therapeutic [place]  Flowsheets (Taken 7/14/2024 0030)  Trust Relationship/Rapport:   care explained   thoughts/feelings acknowledged  Intervention: Mutually Develop Transition Plan  Flowsheets (Taken 7/14/2024 0030)  Current Discharge Risk:   psychiatric illness   substance use/abuse  Readmission Within the Last 30 Days: no previous admission in last 30 days  Outpatient/Agency/Support Group Needs:   outpatient medication management   outpatient counseling  Anticipated Discharge Disposition: homeless  Transportation Concerns: no car  Patient/Family Anticipated Services at Transition:   outpatient care   mental health services  Patient/Family Anticipates Transition to: shelter  Concerns to be Addressed:   medication   mental health   substance/tobacco abuse/use  Goal: Rounds/Family Conference  Outcome: Not Progressing  Flowsheets (Taken 7/14/2024 0030)  Participants:   milieu/psych techs   nursing     Problem: Psychotic Signs/Symptoms  Goal: Improved Behavioral Control (Psychotic Signs/Symptoms)  Outcome: Not Progressing  Flowsheets (Taken 7/14/2024 0030)  Mutually Determined Action Steps (Improved Behavioral Control): identifies symptoms triggers  Intervention: Manage Behavior  Flowsheets (Taken 7/14/2024 0030)  De-Escalation Techniques:   quiet time facilitated   medication administered  Goal: Optimal Cognitive Function (Psychotic Signs/Symptoms)  Outcome: Not Progressing  Intervention: Support and Promote Cognitive Ability  Flowsheets (Taken 7/14/2024 0030)  Trust Relationship/Rapport:   care explained   thoughts/feelings acknowledged  Goal: Increased Participation and Engagement (Psychotic Signs/Symptoms)  Outcome: Not Progressing  Intervention: Facilitate Participation and Engagement  Flowsheets (Taken 7/14/2024 0030)  Supportive Measures:   active listening utilized   self-care encouraged   verbalization  of feelings encouraged  Goal: Improved Mood Symptoms (Psychotic Signs/Symptoms)  Outcome: Not Progressing  Flowsheets (Taken 7/14/2024 0030)  Mutually Determined Action Steps (Improved Mood Symptoms): acknowledges progress  Intervention: Optimize Emotion and Mood  Flowsheets (Taken 7/14/2024 0030)  Supportive Measures:   active listening utilized   self-care encouraged   verbalization of feelings encouraged  Goal: Improved Psychomotor Symptoms (Psychotic Signs/Symptoms)  Outcome: Not Progressing  Flowsheets (Taken 7/14/2024 0030)  Mutually Determined Action Steps (Improved Psychomotor Symptoms): exhibits decrease in agitation  Intervention: Manage Psychomotor Movement  Flowsheets (Taken 7/14/2024 0030)  Activity (Behavioral Health): up ad ismael  Goal: Decreased Sensory Symptoms (Psychotic Signs/Symptoms)  Outcome: Not Progressing  Flowsheets (Taken 7/14/2024 0030)  Mutually Determined Action Steps (Decreased Sensory Symptoms): adheres to medication regimen  Intervention: Minimize and Manage Sensory Impairment  Flowsheets (Taken 7/14/2024 0030)  Sensory Stimulation Regulation: quiet environment promoted  Goal: Improved Sleep (Psychotic Signs/Symptoms)  Outcome: Not Progressing  Flowsheets (Taken 7/14/2024 0030)  Mutually Determined Action Steps (Improved Sleep): sleeps 4-6 hours at night  Intervention: Promote Healthy Sleep Hygiene  Flowsheets (Taken 7/14/2024 0030)  Sleep Hygiene Promotion:   awakenings minimized   regular sleep pattern promoted  Goal: Enhanced Social, Occupational or Functional Skills (Psychotic Signs/Symptoms)  Outcome: Not Progressing  Intervention: Promote Social, Occupational and Functional Ability  Flowsheets (Taken 7/14/2024 0030)  Trust Relationship/Rapport:   care explained   thoughts/feelings acknowledged  Social Functional Ability Promotion:   autonomy promoted   self-expression encouraged     Problem: Excessive Substance Use  Goal: Optimized Energy Level (Excessive Substance Use)  Outcome:  Not Progressing  Flowsheets (Taken 7/14/2024 0030)  Mutually Determined Action Steps (Optimized Energy Level): grooms self without prompting  Intervention: Optimize Energy Level  Flowsheets (Taken 7/14/2024 0030)  Activity (Behavioral Health): up ad ismael  Goal: Improved Behavioral Control (Excessive Substance Use)  Outcome: Not Progressing  Intervention: Promote Behavior and Impulse Control  Flowsheets (Taken 7/14/2024 0030)  Behavior Management: behavioral plan reviewed  Goal: Increased Participation and Engagement (Excessive Substance Use)  Outcome: Not Progressing  Intervention: Facilitate Participation and Engagement  Flowsheets (Taken 7/14/2024 0030)  Supportive Measures:   active listening utilized   self-care encouraged   verbalization of feelings encouraged  Goal: Improved Physiologic Symptoms (Excessive Substance Use)  Outcome: Not Progressing  Intervention: Optimize Physiologic Function  Flowsheets (Taken 7/14/2024 0030)  Oral Nutrition Promotion: rest periods promoted  Nutrition Interventions: food preferences provided  Goal: Enhanced Social, Occupational or Functional Skills (Excessive Substance Use)  Outcome: Not Progressing  Intervention: Promote Social, Occupational and Functional Ability  Flowsheets (Taken 7/14/2024 0030)  Trust Relationship/Rapport:   care explained   thoughts/feelings acknowledged  Social Functional Ability Promotion:   autonomy promoted   self-expression encouraged     Problem: Suicide Risk  Goal: Absence of Self-Harm  Outcome: Not Progressing  Intervention: Assess Risk to Self and Maintain Safety  Flowsheets (Taken 7/14/2024 0030)  Behavior Management: behavioral plan reviewed  Enhanced Safety Measures: monitored by video  Self-Harm Prevention: environmental self-harm risks assessed  Intervention: Promote Psychosocial Wellbeing  Flowsheets (Taken 7/14/2024 0030)  Sleep/Rest Enhancement:   awakenings minimized   regular sleep/rest pattern promoted  Supportive Measures:   active  "listening utilized   self-care encouraged   verbalization of feelings encouraged  Family/Support System Care: self-care encouraged  Intervention: Establish Safety Plan and Continuity of Care  Flowsheets (Taken 7/14/2024 0030)  Safe Transition Promotion: access to lethal means addressed   AAOx4. Flat affect. Anxious and depressed mood. Denies suicidal ideations. Endorses auditory hallucinations. He is paranoid and suspicious. Refuses to state what he is hearing. "I don't want to talk about that." He is withdrawn and isolative. Compliant with medications. Reports that he is not sleeping well but has a good appetite. No agitation or aggression noted. Continue with plan of care and q 15 minute safety checks.   "

## 2024-07-14 NOTE — PROGRESS NOTES
07/14/24 1030   [facility] Group Therapy   Group Name Mental Awareness   Specific Interventions Relapse Prevention   Participation Level None   Participation Quality Sleeping   Insight/Motivation None;Other (see comments)  (didn't attend)

## 2024-07-14 NOTE — PLAN OF CARE
Problem: Adult Behavioral Health Plan of Care  Goal: Plan of Care Review  Outcome: Progressing  Flowsheets (Taken 7/14/2024 1003)  Patient Agreement with Plan of Care: agrees  Plan of Care Reviewed With: patient  Goal: Patient-Specific Goal (Individualization)  Outcome: Progressing  Flowsheets (Taken 7/14/2024 1003)  Patient Personal Strengths: independent living skills  Patient Vulnerabilities: substance abuse/addiction  Goal: Adheres to Safety Considerations for Self and Others  Outcome: Progressing  Flowsheets (Taken 7/14/2024 1003)  Adheres to Safety Considerations for Self and Others: making progress toward outcome  Intervention: Develop and Maintain Individualized Safety Plan  Flowsheets (Taken 7/14/2024 1003)  Safety Measures: safety rounds completed  Goal: Absence of New-Onset Illness or Injury  Outcome: Progressing  Intervention: Identify and Manage Fall Risk  Flowsheets (Taken 7/14/2024 1003)  Safety Measures: safety rounds completed  Intervention: Prevent VTE (Venous Thromboembolism)  Flowsheets (Taken 7/14/2024 1003)  VTE Prevention/Management: fluids promoted  Intervention: Prevent Infection  Flowsheets (Taken 7/14/2024 1003)  Infection Prevention: hand hygiene promoted  Goal: Optimized Coping Skills in Response to Life Stressors  Outcome: Progressing  Flowsheets (Taken 7/14/2024 1003)  Optimized Coping Skills in Response to Life Stressors: making progress toward outcome  Intervention: Promote Effective Coping Strategies  Flowsheets (Taken 7/14/2024 1003)  Supportive Measures: self-care encouraged  Goal: Develops/Participates in Therapeutic [place] to Support Successful Transition  Outcome: Progressing  Flowsheets (Taken 7/14/2024 1003)  Develops/Participates in Therapeutic [place] to Support Successful Transition: making progress toward outcome  Intervention: Foster Therapeutic [place]  Flowsheets (Taken 7/14/2024 1003)  Trust Relationship/Rapport: care explained  Goal: Rounds/Family  Conference  Outcome: Progressing  Flowsheets (Taken 7/14/2024 1003)  Participants:   milieu/psych techs   nursing     Problem: Psychotic Signs/Symptoms  Goal: Improved Behavioral Control (Psychotic Signs/Symptoms)  Outcome: Progressing  Flowsheets (Taken 7/14/2024 1003)  Mutually Determined Action Steps (Improved Behavioral Control): identifies symptoms triggers  Intervention: Manage Behavior  Flowsheets (Taken 7/14/2024 1003)  De-Escalation Techniques: quiet time facilitated  Goal: Optimal Cognitive Function (Psychotic Signs/Symptoms)  Outcome: Progressing  Flowsheets (Taken 7/14/2024 1003)  Mutually Determined Action Steps (Optimal Cognitive Function): participates in attention training  Intervention: Support and Promote Cognitive Ability  Flowsheets (Taken 7/14/2024 1003)  Trust Relationship/Rapport: care explained  Goal: Increased Participation and Engagement (Psychotic Signs/Symptoms)  Outcome: Progressing  Flowsheets (Taken 7/14/2024 1003)  Mutually Determined Action Steps (Increased Participation and Engagement): identifies symptoms triggers  Intervention: Facilitate Participation and Engagement  Flowsheets (Taken 7/14/2024 1003)  Supportive Measures: self-care encouraged  Diversional Activity: television  Goal: Improved Mood Symptoms (Psychotic Signs/Symptoms)  Outcome: Progressing  Flowsheets (Taken 7/14/2024 1003)  Mutually Determined Action Steps (Improved Mood Symptoms): acknowledges progress  Intervention: Optimize Emotion and Mood  Flowsheets (Taken 7/14/2024 1003)  Supportive Measures: self-care encouraged  Diversional Activity: television  Goal: Improved Psychomotor Symptoms (Psychotic Signs/Symptoms)  Outcome: Progressing  Flowsheets (Taken 7/14/2024 1003)  Mutually Determined Action Steps (Improved Psychomotor Symptoms): adheres to medication regimen  Intervention: Manage Psychomotor Movement  Flowsheets (Taken 7/14/2024 1003)  Activity (Behavioral Health): up ad ismael  Patient Performed Hygiene:  teeth brushed  Diversional Activity: television  Goal: Decreased Sensory Symptoms (Psychotic Signs/Symptoms)  Outcome: Progressing  Flowsheets (Taken 7/14/2024 1003)  Mutually Determined Action Steps (Decreased Sensory Symptoms): adheres to medication regimen  Intervention: Minimize and Manage Sensory Impairment  Flowsheets (Taken 7/14/2024 1003)  Sensory Stimulation Regulation: quiet environment promoted  Goal: Improved Sleep (Psychotic Signs/Symptoms)  Outcome: Progressing  Flowsheets (Taken 7/14/2024 1003)  Mutually Determined Action Steps (Improved Sleep): sleeps 4-6 hours at night  Intervention: Promote Healthy Sleep Hygiene  Flowsheets (Taken 7/14/2024 1003)  Sleep Hygiene Promotion: regular sleep pattern promoted  Goal: Enhanced Social, Occupational or Functional Skills (Psychotic Signs/Symptoms)  Outcome: Progressing  Flowsheets (Taken 7/14/2024 1003)  Mutually Determined Action Steps (Enhanced Social, Occupational or Functional Skills): participates in social skills training  Intervention: Promote Social, Occupational and Functional Ability  Flowsheets (Taken 7/14/2024 1003)  Trust Relationship/Rapport: care explained  Social Functional Ability Promotion: autonomy promoted     Problem: Excessive Substance Use  Goal: Optimized Energy Level (Excessive Substance Use)  Outcome: Progressing  Flowsheets (Taken 7/14/2024 1003)  Mutually Determined Action Steps (Optimized Energy Level): grooms self without prompting  Intervention: Optimize Energy Level  Flowsheets (Taken 7/14/2024 1003)  Activity (Behavioral Health): up ad ismael  Patient Performed Hygiene: teeth brushed  Diversional Activity: television  Goal: Improved Behavioral Control (Excessive Substance Use)  Outcome: Progressing  Flowsheets (Taken 7/14/2024 1003)  Mutually Determined Action Steps (Improved Behavioral Control): identifies major stressors  Intervention: Promote Behavior and Impulse Control  Flowsheets (Taken 7/14/2024 1003)  Behavior Management:  behavioral plan reviewed  Goal: Increased Participation and Engagement (Excessive Substance Use)  Outcome: Progressing  Flowsheets (Taken 7/14/2024 1003)  Mutually Determined Action Steps (Increased Participation and Engagement): discusses ongoing recovery plan  Intervention: Facilitate Participation and Engagement  Flowsheets (Taken 7/14/2024 1003)  Supportive Measures: self-care encouraged  Diversional Activity: television  Goal: Improved Physiologic Symptoms (Excessive Substance Use)  Outcome: Progressing  Flowsheets (Taken 7/14/2024 1003)  Mutually Determined Action Steps (Improved Physiologic Symptoms): discusses use pattern  Intervention: Optimize Physiologic Function  Flowsheets (Taken 7/14/2024 1003)  Oral Nutrition Promotion: rest periods promoted  Nutrition Interventions: food preferences provided  Goal: Enhanced Social, Occupational or Functional Skills (Excessive Substance Use)  Outcome: Progressing  Flowsheets (Taken 7/14/2024 1003)  Mutually Determined Action Steps (Enhanced Social, Occupational or Functional Skills): participates in social skills training  Intervention: Promote Social, Occupational and Functional Ability  Flowsheets (Taken 7/14/2024 1003)  Trust Relationship/Rapport: care explained  Social Functional Ability Promotion: autonomy promoted     Problem: Suicide Risk  Goal: Absence of Self-Harm  Outcome: Progressing  Intervention: Assess Risk to Self and Maintain Safety  Flowsheets (Taken 7/14/2024 1003)  Behavior Management: behavioral plan reviewed  Self-Harm Prevention: environmental self-harm risks assessed  Intervention: Promote Psychosocial Wellbeing  Flowsheets (Taken 7/14/2024 1003)  Sleep/Rest Enhancement: regular sleep/rest pattern promoted  Supportive Measures: self-care encouraged  Family/Support System Care: self-care encouraged  Intervention: Establish Safety Plan and Continuity of Care  Flowsheets (Taken 7/14/2024 1003)  Safe Transition Promotion: access to lethal means  addressed     He is AAO X 4. Flat affect and blunted mood. Endorses anxiety and auditory hallucinations at present time. States he hears people mumbling to him. Guarded. Isolated and withdrawn, but interacts with selected patients and staff. Medication compliant. Good eye contact noted. Speech normal tone and speed. Continue plan of care and provide a safe and therapeutic environment. Continue to monitor every fifteen minutes for safety.

## 2024-07-15 PROCEDURE — 11400000 HC PSYCH PRIVATE ROOM

## 2024-07-15 PROCEDURE — 25000003 PHARM REV CODE 250: Performed by: NURSE PRACTITIONER

## 2024-07-15 RX ADMIN — FLUOXETINE 20 MG: 20 CAPSULE ORAL at 08:07

## 2024-07-15 RX ADMIN — OLANZAPINE 5 MG: 5 TABLET, FILM COATED ORAL at 08:07

## 2024-07-15 NOTE — PROGRESS NOTES
07/12/24 1400   [facility] Group Therapy   Group Name Therapeutic Recreation   Specific Interventions Skilled Activity Leisure Education and Awareness   Participation Level Active;Supportive;Appropriate;Attentive;Sharing   Participation Quality Cooperative   Insight/Motivation Limited   Affect/Mood Display Appropriate;Blunted   Cognition Alert;Oriented   Psychomotor WNL

## 2024-07-15 NOTE — PROGRESS NOTES
07/15/24 1500   [facility] Group Therapy   Group Name Therapeutic Recreation   Specific Interventions Skilled Activity Creative Expression   Participation Level Active;Attentive   Participation Quality Withdrawn   Insight/Motivation Limited   Affect/Mood Display Blunted   Cognition Oriented;Alert   Psychomotor WNL

## 2024-07-15 NOTE — PROGRESS NOTES
07/15/24 1000   [facility] Group Therapy   Group Name Therapeutic Recreation   Specific Interventions Skilled Activity Mild Exercises   Participation Level Minimal;Supportive;Appropriate   Participation Quality Withdrawn;Restive   Insight/Motivation Limited   Affect/Mood Display Appropriate   Cognition Oriented;Alert   Psychomotor WNL

## 2024-07-15 NOTE — NURSING
Patient is flat and depressed. He is guarded and restricted. His speech is adequate. He denies SI or HI.  He says the voices are not there since he started the medications here.  He has linear thoughts.

## 2024-07-15 NOTE — PROGRESS NOTES
Kevin is a 53y/o male admitted for Unspecified Mood Disorder (F39) and Cocaine Use Disorder (F14.10) with a uds +cocaine. CTRs met with Pt 1:1 at [address], Kevin is a cooperative and reported ability to perform his ADL's, CTRS educated Pt to TR group times and dates, with Kevin agreeing to attend TR groups. Kevin reported his treatment goal as Coping skills and get rid of the voices.     07/12/24 1106   General   Admit Date 07/12/24   Primary Diagnosis Unspecified Mood Disorder (F39)   Secondary Diagnosis Cocaine Use Disorder (F14.10)   Yazdanism none   Number of Children 4   Children Living? 4   Occupation    Does the patient have dentures? No   If you were to take part in activities, which of the following would you prefer? Both   Do you feel like you have enough to keep you busy now? Yes   Do you believe that you have the opportunity for physical activity? Yes   Activity Capabilities Maximum   Subjective   Patient states Hearing voices again   Assessment   Mobility ambulates independently   Transfers independently   Musculoskeletal   (none)   Visual Acuity normal vision   Visual Perception depth perception;color perception;recognizes letters;recognizes numbers   Hearing normal   Speech/Communication normal   Cognitive Concerns oriented x4   Emotional Concerns appears depressed   Leisure Interest Survey   Leisure Interest Survey Yes   Social/Group Activities   Shopping Current Interest   Restaurant Current Interest   Solitary Activities   Music Listening Current Interest   Physical Activities   Fitness/Exercise Programs Current Interest   Basketball Current Interest   Spectator Events   Concerts Current Interest   Goals   Additional Documentation yes   Goal Formulation With patient   Time For Goal Achievement 7 days   Goal 1 Coping skills and get rid of the voices   Goal 1-Progress ongoing-progressing,   Plan   Planned Therapy Intervention Group Recreational Therapy   Expected Length of  Stay 5-7days   PT Frequency Minimum of 3 visits per week

## 2024-07-15 NOTE — PROGRESS NOTES
"7/15/2024  Kevin Dukes   1970   [id number]        Psychiatry Progress Note     Chief Complaint: "Doing ok"    SUBJECTIVE:   Kevin Dukes is a 54 y.o. male placed under a PEC at [facility] for auditory hallucinations and homicidal ideations.     No acute complaints this morning.  Staff reports that patient spends most of his time in his room.  Tolerating current medication regimen without issues.  No tremors, rigidity, extrapyramidal symptoms, or excessive sedation noted.  Denies acute homicidal ideations.  Will continue current plan of care and will monitor progress.       UDS: (+) Cocaine  ETOH: (-)        Current Medications:   Scheduled Meds:    FLUoxetine  20 mg Oral Daily    nicotine  1 patch Transdermal Daily    OLANZapine  5 mg Oral QHS      PRN Meds:   Current Facility-Administered Medications:     acetaminophen, 650 mg, Oral, Q6H PRN    aluminum-magnesium hydroxide-simethicone, 30 mL, Oral, Q6H PRN    cloNIDine, 0.1 mg, Oral, Q8H PRN    cloNIDine, 0.2 mg, Oral, Q8H PRN    haloperidoL, 10 mg, Oral, Q4H PRN **AND** diphenhydrAMINE, 50 mg, Oral, Q4H PRN **AND** LORazepam, 2 mg, Oral, Q4H PRN **AND** haloperidol lactate, 10 mg, Intramuscular, Q4H PRN **AND** diphenhydrAMINE, 50 mg, Intramuscular, Q4H PRN **AND** lorazepam, 2 mg, Intramuscular, Q4H PRN    hydrOXYzine HCL, 50 mg, Oral, Q4H PRN    magnesium hydroxide 400 mg/5 ml, 30 mL, Oral, Daily PRN    ondansetron, 4 mg, Oral, Q6H PRN    traZODone, 100 mg, Oral, Nightly PRN   Psychotherapeutics (From admission, onward)      Start     Stop Route Frequency Ordered    07/13/24 2100  OLANZapine tablet 5 mg         -- Oral Nightly 07/13/24 1214    07/13/24 1315  FLUoxetine capsule 20 mg         -- Oral Daily 07/13/24 1214    07/12/24 1123  traZODone tablet 100 mg         -- Oral Nightly PRN 07/12/24 1104    07/12/24 1119  haloperidoL tablet 10 mg  (Med - Acute  Behavioral Management)        Placed in "And" Linked Group    -- Oral Every 4 hours PRN " "07/12/24 1104    07/12/24 1119  LORazepam tablet 2 mg  (Med - Acute  Behavioral Management)        Placed in "And" Linked Group    -- Oral Every 4 hours PRN 07/12/24 1104    07/12/24 1119  haloperidol lactate injection 10 mg  (Med - Acute  Behavioral Management)        Placed in "And" Linked Group    -- IM Every 4 hours PRN 07/12/24 1104    07/12/24 1119  LORazepam injection 2 mg  (Med - Acute  Behavioral Management)        Placed in "And" Linked Group    -- IM Every 4 hours PRN 07/12/24 1104            Allergies:   Review of patient's allergies indicates:  No Known Allergies     OBJECTIVE:   Vitals   Vitals:    07/14/24 1915   BP: (!) 152/92   Pulse: 64   Resp: 17        Labs/Imaging/Studies:   No results found for this or any previous visit (from the past 36 hour(s)).       Medical Review Of Systems:  Constitutional: negative  Respiratory: negative  Cardiovascular: negative  Gastrointestinal: negative  Genitourinary:negative  Musculoskeletal:negative  Neurological: negative       Psychiatric Mental Status Exam:  General Appearance: appears stated age, well-developed, well-nourished  Arousal: alert  Behavior: cooperative  Movements and Motor Activity: no abnormal involuntary movements noted  Orientation: oriented to person, place, time, and situation  Speech: normal rate, normal rhythm, normal volume, normal tone  Mood: "Ok"  Affect: constricted  Thought Process: linear  Associations: intact  Thought Content and Perceptions: no suicidal ideation, no homicidal ideation, no auditory hallucinations, no visual hallucinations, no paranoid ideation, no ideas of reference  Recent and Remote Memory: recent memory intact, remote memory intact; per interview/observation with patient  Attention and Concentration: intact, attentive to conversation; per interview/observation with patient  Fund of Knowledge: intact, aware of current events, vocabulary appropriate; based on history, vocabulary, fund of knowledge, syntax, grammar, " and content  Insight: questionable; based on understanding of severity of illness and HPI  Judgment: questionable; based on patient's behavior and HPI      ASSESSMENT/PLAN:   Problems Addressed/Diagnoses:  Unspecified Mood Disorder (F39)  Cocaine Use Disorder (F14.20)    Past Medical History:   Diagnosis Date    Bipolar disorder, unspecified     History of psychiatric hospitalization     Hx of psychiatric care     [place]     Psychiatric problem     Schizophrenia, unspecified     Substance abuse         Plan:  Mood disorder, acute  -Continue Zyprexa  -Continue Prozac    Cocaine use, acute  -Group/Individual psychotherapy     Expected Disposition Plan: Home        Juan M Anand M.D.

## 2024-07-15 NOTE — PLAN OF CARE
Problem: Adult Behavioral Health Plan of Care  Goal: Plan of Care Review  Outcome: Progressing  Flowsheets (Taken 7/15/2024 0129)  Patient Agreement with Plan of Care: agrees  Plan of Care Reviewed With: patient  Goal: Patient-Specific Goal (Individualization)  Outcome: Progressing  Flowsheets (Taken 7/15/2024 0129)  Patient Personal Strengths: independent living skills  Patient Vulnerabilities:   substance abuse/addiction   lacks insight into illness  Goal: Adheres to Safety Considerations for Self and Others  Outcome: Progressing  Flowsheets (Taken 7/15/2024 0129)  Adheres to Safety Considerations for Self and Others: making progress toward outcome  Intervention: Develop and Maintain Individualized Safety Plan  Flowsheets (Taken 7/15/2024 0129)  Safety Measures:   monitored by video   safety rounds completed  Goal: Absence of New-Onset Illness or Injury  Outcome: Progressing  Intervention: Identify and Manage Fall Risk  Flowsheets (Taken 7/15/2024 0129)  Safety Measures:   monitored by video   safety rounds completed  Intervention: Prevent VTE (Venous Thromboembolism)  Flowsheets (Taken 7/15/2024 0129)  VTE Prevention/Management:   ambulation promoted   fluids promoted  Intervention: Prevent Infection  Flowsheets (Taken 7/15/2024 0129)  Infection Prevention:   rest/sleep promoted   hand hygiene promoted  Goal: Optimized Coping Skills in Response to Life Stressors  Outcome: Progressing  Flowsheets (Taken 7/15/2024 0129)  Optimized Coping Skills in Response to Life Stressors: making progress toward outcome  Intervention: Promote Effective Coping Strategies  Flowsheets (Taken 7/15/2024 0129)  Supportive Measures:   active listening utilized   verbalization of feelings encouraged  Goal: Develops/Participates in Therapeutic [place] to Support Successful Transition  Outcome: Progressing  Flowsheets (Taken 7/15/2024 0129)  Develops/Participates in Therapeutic [place] to Support Successful Transition: making progress  toward outcome  Intervention: Foster Therapeutic [place]  Flowsheets (Taken 7/15/2024 0129)  Trust Relationship/Rapport:   care explained   thoughts/feelings acknowledged  Intervention: Mutually Develop Transition Plan  Flowsheets (Taken 7/15/2024 0129)  Current Discharge Risk:   psychiatric illness   substance use/abuse  Readmission Within the Last 30 Days: no previous admission in last 30 days  Outpatient/Agency/Support Group Needs:   outpatient counseling   outpatient medication management  Anticipated Discharge Disposition: homeless  Patient/Family Anticipated Services at Transition:   mental health services   outpatient care  Patient/Family Anticipates Transition to: shelter  Concerns to be Addressed:   medication   mental health   substance/tobacco abuse/use  Goal: Rounds/Family Conference  Outcome: Progressing  Flowsheets (Taken 7/15/2024 0129)  Participants:   milieu/psych techs   nursing     Problem: Psychotic Signs/Symptoms  Goal: Improved Behavioral Control (Psychotic Signs/Symptoms)  Outcome: Progressing  Flowsheets (Taken 7/15/2024 0129)  Mutually Determined Action Steps (Improved Behavioral Control): identifies symptoms triggers  Intervention: Manage Behavior  Flowsheets (Taken 7/15/2024 0129)  De-Escalation Techniques:   medication administered   medication offered  Goal: Optimal Cognitive Function (Psychotic Signs/Symptoms)  Outcome: Progressing  Intervention: Support and Promote Cognitive Ability  Flowsheets (Taken 7/15/2024 0129)  Trust Relationship/Rapport:   care explained   thoughts/feelings acknowledged  Goal: Increased Participation and Engagement (Psychotic Signs/Symptoms)  Outcome: Progressing  Intervention: Facilitate Participation and Engagement  Flowsheets (Taken 7/15/2024 0129)  Supportive Measures:   active listening utilized   verbalization of feelings encouraged  Goal: Improved Mood Symptoms (Psychotic Signs/Symptoms)  Outcome: Progressing  Flowsheets (Taken 7/15/2024 0129)  Mutually  Determined Action Steps (Improved Mood Symptoms): acknowledges progress  Intervention: Optimize Emotion and Mood  Flowsheets (Taken 7/15/2024 0129)  Supportive Measures:   active listening utilized   verbalization of feelings encouraged  Goal: Improved Psychomotor Symptoms (Psychotic Signs/Symptoms)  Outcome: Progressing  Flowsheets (Taken 7/15/2024 0129)  Mutually Determined Action Steps (Improved Psychomotor Symptoms): adheres to medication regimen  Intervention: Manage Psychomotor Movement  Flowsheets (Taken 7/15/2024 0129)  Activity (Behavioral Health): up ad ismael  Goal: Decreased Sensory Symptoms (Psychotic Signs/Symptoms)  Outcome: Progressing  Flowsheets (Taken 7/15/2024 0129)  Mutually Determined Action Steps (Decreased Sensory Symptoms): adheres to medication regimen  Intervention: Minimize and Manage Sensory Impairment  Flowsheets (Taken 7/15/2024 0129)  Sensory Stimulation Regulation: quiet environment promoted  Goal: Improved Sleep (Psychotic Signs/Symptoms)  Outcome: Progressing  Flowsheets (Taken 7/15/2024 0129)  Mutually Determined Action Steps (Improved Sleep): sleeps 4-6 hours at night  Intervention: Promote Healthy Sleep Hygiene  Flowsheets (Taken 7/15/2024 0129)  Sleep Hygiene Promotion:   awakenings minimized   regular sleep pattern promoted  Goal: Enhanced Social, Occupational or Functional Skills (Psychotic Signs/Symptoms)  Outcome: Progressing  Intervention: Promote Social, Occupational and Functional Ability  Flowsheets (Taken 7/15/2024 0129)  Trust Relationship/Rapport:   care explained   thoughts/feelings acknowledged  Social Functional Ability Promotion: self-expression encouraged     Problem: Excessive Substance Use  Goal: Optimized Energy Level (Excessive Substance Use)  Outcome: Progressing  Flowsheets (Taken 7/15/2024 0129)  Mutually Determined Action Steps (Optimized Energy Level): grooms self without prompting  Intervention: Optimize Energy Level  Flowsheets (Taken 7/15/2024  0129)  Activity (Behavioral Health): up ad ismael  Goal: Improved Behavioral Control (Excessive Substance Use)  Outcome: Progressing  Intervention: Promote Behavior and Impulse Control  Flowsheets (Taken 7/15/2024 0129)  Behavior Management: behavioral plan developed  Goal: Increased Participation and Engagement (Excessive Substance Use)  Outcome: Progressing  Intervention: Facilitate Participation and Engagement  Flowsheets (Taken 7/15/2024 0129)  Supportive Measures:   active listening utilized   verbalization of feelings encouraged  Goal: Improved Physiologic Symptoms (Excessive Substance Use)  Outcome: Progressing  Intervention: Optimize Physiologic Function  Flowsheets (Taken 7/15/2024 0129)  Oral Nutrition Promotion: social interaction promoted  Nutrition Interventions: food preferences provided  Goal: Enhanced Social, Occupational or Functional Skills (Excessive Substance Use)  Outcome: Progressing  Intervention: Promote Social, Occupational and Functional Ability  Flowsheets (Taken 7/15/2024 0129)  Trust Relationship/Rapport:   care explained   thoughts/feelings acknowledged  Social Functional Ability Promotion: self-expression encouraged     Problem: Suicide Risk  Goal: Absence of Self-Harm  Outcome: Progressing  Intervention: Assess Risk to Self and Maintain Safety  Flowsheets (Taken 7/15/2024 0129)  Behavior Management: behavioral plan developed  Enhanced Safety Measures: monitored by video  Self-Harm Prevention: environmental self-harm risks assessed  Intervention: Promote Psychosocial Wellbeing  Flowsheets (Taken 7/15/2024 0129)  Sleep/Rest Enhancement:   awakenings minimized   regular sleep/rest pattern promoted  Supportive Measures:   active listening utilized   verbalization of feelings encouraged  Family/Support System Care: self-care encouraged  Intervention: Establish Safety Plan and Continuity of Care  Flowsheets (Taken 7/15/2024 0129)  Safe Transition Promotion: access to lethal means addressed    AAOx4. Flat affect. Anxious and depressed mood. Denies suicidal ideations. Endorses auditory hallucinations, states that he doesn't know what voices are saying. Minimal interaction with staff and peers. Did not attend group today. Compliant with medications. Unable to tell if medications are working at this time. No agitation or aggression noted. Reports that he is eating and sleeping well. Continue with plan of care and q 15 minute safety checks.

## 2024-07-15 NOTE — PLAN OF CARE
Problem: Psychotic Signs/Symptoms  Goal: Improved Behavioral Control (Psychotic Signs/Symptoms)  Outcome: Progressing  Flowsheets (Taken 7/15/2024 1231)  Mutually Determined Action Steps (Improved Behavioral Control): identifies symptoms triggers  Intervention: Manage Behavior  Flowsheets (Taken 7/15/2024 1231)  De-Escalation Techniques: quiet time facilitated  Goal: Optimal Cognitive Function (Psychotic Signs/Symptoms)  Outcome: Progressing  Flowsheets (Taken 7/15/2024 1231)  Mutually Determined Action Steps (Optimal Cognitive Function): participates in attention training  Intervention: Support and Promote Cognitive Ability  Flowsheets (Taken 7/15/2024 1231)  Trust Relationship/Rapport:   care explained   thoughts/feelings acknowledged  Goal: Increased Participation and Engagement (Psychotic Signs/Symptoms)  Outcome: Not Progressing  Flowsheets (Taken 7/15/2024 1231)  Mutually Determined Action Steps (Increased Participation and Engagement): identifies symptoms triggers  Intervention: Facilitate Participation and Engagement  Flowsheets (Taken 7/15/2024 1231)  Supportive Measures: active listening utilized  Diversional Activity: television  Goal: Improved Mood Symptoms (Psychotic Signs/Symptoms)  Outcome: Not Progressing  Flowsheets (Taken 7/15/2024 1231)  Mutually Determined Action Steps (Improved Mood Symptoms): acknowledges progress  Intervention: Optimize Emotion and Mood  Flowsheets (Taken 7/15/2024 1231)  Supportive Measures: active listening utilized  Diversional Activity: television  Goal: Improved Psychomotor Symptoms (Psychotic Signs/Symptoms)  Outcome: Not Progressing  Flowsheets (Taken 7/15/2024 1231)  Mutually Determined Action Steps (Improved Psychomotor Symptoms): adheres to medication regimen  Intervention: Manage Psychomotor Movement  Flowsheets (Taken 7/15/2024 1231)  Activity (Behavioral Health): up ad ismael  Diversional Activity: television

## 2024-07-16 LAB
CHOLEST SERPL-MCNC: 203 MG/DL
CHOLEST/HDLC SERPL: 3 {RATIO} (ref 0–5)
EST. AVERAGE GLUCOSE BLD GHB EST-MCNC: 108.3 MG/DL
GLUCOSE P FAST SERPL-MCNC: 83 MG/DL (ref 70–100)
HBA1C MFR BLD: 5.4 %
HDLC SERPL-MCNC: 65 MG/DL (ref 35–60)
LDLC SERPL CALC-MCNC: 119 MG/DL (ref 50–140)
T PALLIDUM AB SER QL: NONREACTIVE
TRIGL SERPL-MCNC: 93 MG/DL (ref 34–140)
TSH SERPL-ACNC: 1.99 UIU/ML (ref 0.35–4.94)
VLDLC SERPL CALC-MCNC: 19 MG/DL

## 2024-07-16 PROCEDURE — 11400000 HC PSYCH PRIVATE ROOM

## 2024-07-16 PROCEDURE — 82947 ASSAY GLUCOSE BLOOD QUANT: CPT | Performed by: NURSE PRACTITIONER

## 2024-07-16 PROCEDURE — 86780 TREPONEMA PALLIDUM: CPT | Performed by: NURSE PRACTITIONER

## 2024-07-16 PROCEDURE — 25000003 PHARM REV CODE 250

## 2024-07-16 PROCEDURE — 25000003 PHARM REV CODE 250: Performed by: NURSE PRACTITIONER

## 2024-07-16 PROCEDURE — 83036 HEMOGLOBIN GLYCOSYLATED A1C: CPT | Performed by: NURSE PRACTITIONER

## 2024-07-16 PROCEDURE — 84443 ASSAY THYROID STIM HORMONE: CPT | Performed by: NURSE PRACTITIONER

## 2024-07-16 PROCEDURE — 80061 LIPID PANEL: CPT | Performed by: NURSE PRACTITIONER

## 2024-07-16 RX ORDER — MUPIROCIN 20 MG/G
OINTMENT TOPICAL 2 TIMES DAILY
Status: DISCONTINUED | OUTPATIENT
Start: 2024-07-16 | End: 2024-07-16

## 2024-07-16 RX ADMIN — FLUOXETINE 20 MG: 20 CAPSULE ORAL at 09:07

## 2024-07-16 RX ADMIN — OLANZAPINE 7.5 MG: 5 TABLET, FILM COATED ORAL at 08:07

## 2024-07-16 NOTE — PLAN OF CARE
Problem: Adult Behavioral Health Plan of Care  Goal: Plan of Care Review  Outcome: Met  Flowsheets (Taken 7/15/2024 2203)  Patient Agreement with Plan of Care: agrees  Plan of Care Reviewed With: patient  Goal: Patient-Specific Goal (Individualization)  Outcome: Progressing  Flowsheets (Taken 7/15/2024 2203)  Patient Personal Strengths: independent living skills  Patient Vulnerabilities:   substance abuse/addiction   lacks insight into illness   limited support system  Goal: Adheres to Safety Considerations for Self and Others  Outcome: Progressing  Flowsheets (Taken 7/15/2024 2203)  Adheres to Safety Considerations for Self and Others: making progress toward outcome  Intervention: Develop and Maintain Individualized Safety Plan  Flowsheets (Taken 7/15/2024 2203)  Safety Measures:   safety rounds completed   monitored by video  Goal: Absence of New-Onset Illness or Injury  Outcome: Progressing  Intervention: Identify and Manage Fall Risk  Flowsheets (Taken 7/15/2024 2203)  Safety Measures:   safety rounds completed   monitored by video  Intervention: Prevent VTE (Venous Thromboembolism)  Flowsheets (Taken 7/15/2024 2203)  VTE Prevention/Management:   ambulation promoted   fluids promoted  Intervention: Prevent Infection  Flowsheets (Taken 7/15/2024 2203)  Infection Prevention:   hand hygiene promoted   rest/sleep promoted  Goal: Optimized Coping Skills in Response to Life Stressors  Outcome: Progressing  Flowsheets (Taken 7/15/2024 2203)  Optimized Coping Skills in Response to Life Stressors: making progress toward outcome  Intervention: Promote Effective Coping Strategies  Flowsheets (Taken 7/15/2024 2203)  Supportive Measures:   active listening utilized   verbalization of feelings encouraged  Goal: Develops/Participates in Therapeutic [place] to Support Successful Transition  Outcome: Progressing  Flowsheets (Taken 7/15/2024 2203)  Develops/Participates in Therapeutic [place] to Support Successful Transition:  making progress toward outcome  Intervention: Foster Therapeutic [place]  Flowsheets (Taken 7/15/2024 2203)  Trust Relationship/Rapport:   care explained   thoughts/feelings acknowledged  Intervention: Mutually Develop Transition Plan  Flowsheets (Taken 7/15/2024 2203)  Current Discharge Risk:   psychiatric illness   substance use/abuse  Readmission Within the Last 30 Days: no previous admission in last 30 days  Outpatient/Agency/Support Group Needs:   outpatient counseling   outpatient medication management  Anticipated Discharge Disposition: homeless  Patient/Family Anticipated Services at Transition:   outpatient care   mental health services  Patient/Family Anticipates Transition to: shelter  Concerns to be Addressed:   medication   mental health  Goal: Rounds/Family Conference  Outcome: Progressing  Flowsheets (Taken 7/15/2024 2203)  Participants:   milieu/psych techs   nursing     Problem: Psychotic Signs/Symptoms  Goal: Improved Behavioral Control (Psychotic Signs/Symptoms)  Outcome: Progressing  Intervention: Manage Behavior  Flowsheets (Taken 7/15/2024 2203)  De-Escalation Techniques: quiet time facilitated  Goal: Optimal Cognitive Function (Psychotic Signs/Symptoms)  Outcome: Progressing  Intervention: Support and Promote Cognitive Ability  Flowsheets (Taken 7/15/2024 2203)  Trust Relationship/Rapport:   care explained   thoughts/feelings acknowledged  Goal: Increased Participation and Engagement (Psychotic Signs/Symptoms)  Outcome: Progressing  Intervention: Facilitate Participation and Engagement  Flowsheets (Taken 7/15/2024 2203)  Supportive Measures:   active listening utilized   verbalization of feelings encouraged  Goal: Improved Mood Symptoms (Psychotic Signs/Symptoms)  Outcome: Progressing  Flowsheets (Taken 7/15/2024 2203)  Mutually Determined Action Steps (Improved Mood Symptoms): acknowledges progress  Intervention: Optimize Emotion and Mood  Flowsheets (Taken 7/15/2024 2203)  Supportive  Measures:   active listening utilized   verbalization of feelings encouraged  Goal: Improved Psychomotor Symptoms (Psychotic Signs/Symptoms)  Outcome: Progressing  Flowsheets (Taken 7/15/2024 2203)  Mutually Determined Action Steps (Improved Psychomotor Symptoms): exhibits decrease in agitation  Intervention: Manage Psychomotor Movement  Flowsheets (Taken 7/15/2024 2203)  Activity (Behavioral Health): up ad ismael  Goal: Decreased Sensory Symptoms (Psychotic Signs/Symptoms)  Outcome: Progressing  Flowsheets (Taken 7/15/2024 2203)  Mutually Determined Action Steps (Decreased Sensory Symptoms): adheres to medication regimen  Intervention: Minimize and Manage Sensory Impairment  Flowsheets (Taken 7/15/2024 2203)  Sensory Stimulation Regulation: quiet environment promoted  Goal: Improved Sleep (Psychotic Signs/Symptoms)  Outcome: Progressing  Flowsheets (Taken 7/15/2024 2203)  Mutually Determined Action Steps (Improved Sleep): sleeps 4-6 hours at night  Intervention: Promote Healthy Sleep Hygiene  Flowsheets (Taken 7/15/2024 2203)  Sleep Hygiene Promotion:   awakenings minimized   regular sleep pattern promoted  Goal: Enhanced Social, Occupational or Functional Skills (Psychotic Signs/Symptoms)  Outcome: Progressing  Intervention: Promote Social, Occupational and Functional Ability  Flowsheets (Taken 7/15/2024 2203)  Trust Relationship/Rapport:   care explained   thoughts/feelings acknowledged  Social Functional Ability Promotion: self-expression encouraged     Problem: Excessive Substance Use  Goal: Optimized Energy Level (Excessive Substance Use)  Outcome: Progressing  Flowsheets (Taken 7/15/2024 2203)  Mutually Determined Action Steps (Optimized Energy Level): grooms self without prompting  Intervention: Optimize Energy Level  Flowsheets (Taken 7/15/2024 2203)  Activity (Behavioral Health): up ad ismael  Goal: Improved Behavioral Control (Excessive Substance Use)  Outcome: Progressing  Intervention: Promote Behavior and  Impulse Control  Flowsheets (Taken 7/15/2024 2203)  Behavior Management: behavioral plan reviewed  Goal: Increased Participation and Engagement (Excessive Substance Use)  Outcome: Progressing  Flowsheets (Taken 7/15/2024 2203)  Mutually Determined Action Steps (Increased Participation and Engagement): discusses ongoing recovery plan  Intervention: Facilitate Participation and Engagement  Flowsheets (Taken 7/15/2024 2203)  Supportive Measures:   active listening utilized   verbalization of feelings encouraged  Goal: Improved Physiologic Symptoms (Excessive Substance Use)  Outcome: Progressing  Intervention: Optimize Physiologic Function  Flowsheets (Taken 7/15/2024 2203)  Oral Nutrition Promotion:   rest periods promoted   social interaction promoted  Nutrition Interventions: food preferences provided  Goal: Enhanced Social, Occupational or Functional Skills (Excessive Substance Use)  Outcome: Progressing  Intervention: Promote Social, Occupational and Functional Ability  Flowsheets (Taken 7/15/2024 2203)  Trust Relationship/Rapport:   care explained   thoughts/feelings acknowledged  Social Functional Ability Promotion: self-expression encouraged     Problem: Suicide Risk  Goal: Absence of Self-Harm  Outcome: Progressing  Intervention: Assess Risk to Self and Maintain Safety  Flowsheets (Taken 7/15/2024 2203)  Behavior Management: behavioral plan reviewed  Enhanced Safety Measures: monitored by video  Self-Harm Prevention: environmental self-harm risks assessed  Intervention: Promote Psychosocial Wellbeing  Flowsheets (Taken 7/15/2024 2203)  Sleep/Rest Enhancement:   awakenings minimized   regular sleep/rest pattern promoted  Supportive Measures:   active listening utilized   verbalization of feelings encouraged  Intervention: Establish Safety Plan and Continuity of Care  Flowsheets (Taken 7/15/2024 2203)  Safe Transition Promotion: access to lethal means addressed   AAOx4. Flat affect, anxious and depressed mood.  Denies suicidal/homicidal ideations. Endorses auditory hallucinations. Withdrawn and isolative. Paranoid and suspicious. Minimal interaction with staff and peers. Attended group therapy today. Compliant with medications. No agitation or aggression noted. Reports that he is eating and sleeping good. Continue with plan of care and q 15 minute safety checks.

## 2024-07-16 NOTE — PROGRESS NOTES
07/16/24 1000   [facility] Group Therapy   Group Name Therapeutic Recreation   Specific Interventions Skilled Activity Mild Exercises   Participation Level Active;Supportive;Appropriate;Attentive;Sharing   Participation Quality Cooperative;Social   Insight/Motivation Improved   Affect/Mood Display Appropriate   Cognition Oriented;Alert   Psychomotor WNL

## 2024-07-16 NOTE — PROGRESS NOTES
"7/16/2024  Kevin Dukes   1970   [id number]        Psychiatry Progress Note     Chief Complaint: "Doing ok"    SUBJECTIVE:   Kevin Dukes is a 54 y.o. male placed under a PEC at [facility] for auditory hallucinations and homicidal ideations.     No acute complaints this morning.  Staff reports that he is continuing to express anxiety, depression, and voices AVH. Patient confirmed this to me during this exam but could not tell me wht he is hearing and seeing. No overt behavioral issues reported by staff. Tolerating current medication regimen without issues.  No tremors, rigidity, extrapyramidal symptoms, or excessive sedation noted.  Denies acute homicidal ideations.  Will increase zyprexa and will monitor progress.       UDS: (+) Cocaine  ETOH: (-)        Current Medications:   Scheduled Meds:    FLUoxetine  20 mg Oral Daily    nicotine  1 patch Transdermal Daily    OLANZapine  5 mg Oral QHS      PRN Meds:   Current Facility-Administered Medications:     acetaminophen, 650 mg, Oral, Q6H PRN    aluminum-magnesium hydroxide-simethicone, 30 mL, Oral, Q6H PRN    cloNIDine, 0.1 mg, Oral, Q8H PRN    cloNIDine, 0.2 mg, Oral, Q8H PRN    haloperidoL, 10 mg, Oral, Q4H PRN **AND** diphenhydrAMINE, 50 mg, Oral, Q4H PRN **AND** LORazepam, 2 mg, Oral, Q4H PRN **AND** haloperidol lactate, 10 mg, Intramuscular, Q4H PRN **AND** diphenhydrAMINE, 50 mg, Intramuscular, Q4H PRN **AND** lorazepam, 2 mg, Intramuscular, Q4H PRN    hydrOXYzine HCL, 50 mg, Oral, Q4H PRN    magnesium hydroxide 400 mg/5 ml, 30 mL, Oral, Daily PRN    ondansetron, 4 mg, Oral, Q6H PRN    traZODone, 100 mg, Oral, Nightly PRN   Psychotherapeutics (From admission, onward)      Start     Stop Route Frequency Ordered    07/13/24 2100  OLANZapine tablet 5 mg         -- Oral Nightly 07/13/24 1214    07/13/24 1315  FLUoxetine capsule 20 mg         -- Oral Daily 07/13/24 1214    07/12/24 1123  traZODone tablet 100 mg         -- Oral Nightly PRN 07/12/24 1104 " "   07/12/24 1119  haloperidoL tablet 10 mg  (Med - Acute  Behavioral Management)        Placed in "And" Linked Group    -- Oral Every 4 hours PRN 07/12/24 1104    07/12/24 1119  LORazepam tablet 2 mg  (Med - Acute  Behavioral Management)        Placed in "And" Linked Group    -- Oral Every 4 hours PRN 07/12/24 1104    07/12/24 1119  haloperidol lactate injection 10 mg  (Med - Acute  Behavioral Management)        Placed in "And" Linked Group    -- IM Every 4 hours PRN 07/12/24 1104    07/12/24 1119  LORazepam injection 2 mg  (Med - Acute  Behavioral Management)        Placed in "And" Linked Group    -- IM Every 4 hours PRN 07/12/24 1104            Allergies:   Review of patient's allergies indicates:  No Known Allergies     OBJECTIVE:   Vitals   Vitals:    07/16/24 0701   BP: (!) 140/72   Pulse: 76   Resp: 16   Temp: 97.1 °F (36.2 °C)        Labs/Imaging/Studies:   Recent Results (from the past 36 hour(s))   Hemoglobin A1C    Collection Time: 07/16/24  6:34 AM   Result Value Ref Range    Hemoglobin A1c 5.4 <=7.0 %    Estimated Average Glucose 108.3 mg/dL   Glucose, Fasting    Collection Time: 07/16/24  6:34 AM   Result Value Ref Range    Glucose Fasting 83 70 - 100 mg/dL   TSH    Collection Time: 07/16/24  6:34 AM   Result Value Ref Range    TSH 1.990 0.350 - 4.940 uIU/mL   Lipid Panel    Collection Time: 07/16/24  6:34 AM   Result Value Ref Range    Cholesterol Total 203 (H) <=200 mg/dL    HDL Cholesterol 65 (H) 35 - 60 mg/dL    Triglyceride 93 34 - 140 mg/dL    Cholesterol/HDL Ratio 3 0 - 5    Very Low Density Lipoprotein 19     LDL Cholesterol 119.00 50.00 - 140.00 mg/dL          Medical Review Of Systems:  Constitutional: negative  Respiratory: negative  Cardiovascular: negative  Gastrointestinal: negative  Genitourinary:negative  Musculoskeletal:negative  Neurological: negative       Psychiatric Mental Status Exam:  General Appearance: appears stated age, well-developed, well-nourished  Arousal: alert  Behavior: " "cooperative  Movements and Motor Activity: no abnormal involuntary movements noted  Orientation: oriented to person, place, time, and situation  Speech: normal rate, normal rhythm, normal volume, normal tone  Mood: "Ok"  Affect: constricted  Thought Process: linear  Associations: intact  Thought Content and Perceptions: no suicidal ideation, no homicidal ideation, (+) auditory hallucinations, (+) visual hallucinations, no paranoid ideation, no ideas of reference  Recent and Remote Memory: recent memory intact, remote memory intact; per interview/observation with patient  Attention and Concentration: intact, attentive to conversation; per interview/observation with patient  Fund of Knowledge: intact, aware of current events, vocabulary appropriate; based on history, vocabulary, fund of knowledge, syntax, grammar, and content  Insight: questionable; based on understanding of severity of illness and HPI  Judgment: questionable; based on patient's behavior and HPI      ASSESSMENT/PLAN:   Problems Addressed/Diagnoses:  Unspecified Mood Disorder (F39)  Cocaine Use Disorder (F14.20)    Past Medical History:   Diagnosis Date    Bipolar disorder, unspecified     History of psychiatric hospitalization     Hx of psychiatric care     [place]     Psychiatric problem     Schizophrenia, unspecified     Substance abuse         Plan:  Mood disorder, acute  -Increase  Zyprexa to 7.5 mg QHS  -Continue Prozac    Cocaine use, acute  -Group/Individual psychotherapy     Expected Disposition Plan: Home        UNRULY Aranda-BC    "

## 2024-07-16 NOTE — PROGRESS NOTES
07/16/24 1400   [facility] Group Therapy   Group Name Therapeutic Recreation   Specific Interventions Skilled Activity Creative Expression   Participation Level None   Participation Quality Lack of Interest   Insight/Motivation Improved   Affect/Mood Display Appropriate   Cognition Oriented;Alert   Psychomotor WNL

## 2024-07-16 NOTE — PLAN OF CARE
Problem: Adult Behavioral Health Plan of Care  Goal: Patient-Specific Goal (Individualization)  Outcome: Progressing  Flowsheets (Taken 7/16/2024 1000)  Patient Personal Strengths: independent living skills  Patient Vulnerabilities: substance abuse/addiction  Goal: Adheres to Safety Considerations for Self and Others  Outcome: Progressing  Flowsheets (Taken 7/16/2024 1000)  Adheres to Safety Considerations for Self and Others: making progress toward outcome  Intervention: Develop and Maintain Individualized Safety Plan  Flowsheets (Taken 7/16/2024 1000)  Safety Measures: safety rounds completed  Goal: Absence of New-Onset Illness or Injury  Outcome: Progressing  Intervention: Identify and Manage Fall Risk  Flowsheets (Taken 7/16/2024 1000)  Safety Measures: safety rounds completed  Intervention: Prevent VTE (Venous Thromboembolism)  Flowsheets (Taken 7/16/2024 1000)  VTE Prevention/Management: ambulation promoted  Intervention: Prevent Infection  Flowsheets (Taken 7/16/2024 1000)  Infection Prevention: hand hygiene promoted  Goal: Optimized Coping Skills in Response to Life Stressors  Outcome: Progressing  Flowsheets (Taken 7/16/2024 1000)  Optimized Coping Skills in Response to Life Stressors: making progress toward outcome  Intervention: Promote Effective Coping Strategies  Flowsheets (Taken 7/16/2024 1000)  Supportive Measures: self-care encouraged  Goal: Develops/Participates in Therapeutic [place] to Support Successful Transition  Outcome: Progressing  Flowsheets (Taken 7/16/2024 1000)  Develops/Participates in Therapeutic [place] to Support Successful Transition: making progress toward outcome  Intervention: Foster Therapeutic [place]  Flowsheets (Taken 7/16/2024 1000)  Trust Relationship/Rapport: care explained  Goal: Rounds/Family Conference  Outcome: Progressing  Flowsheets (Taken 7/16/2024 1000)  Participants:   milieu/psych techs   nursing     Problem: Psychotic Signs/Symptoms  Goal: Improved  Behavioral Control (Psychotic Signs/Symptoms)  Outcome: Progressing  Flowsheets (Taken 7/16/2024 1000)  Mutually Determined Action Steps (Improved Behavioral Control): identifies symptoms triggers  Intervention: Manage Behavior  Flowsheets (Taken 7/16/2024 1000)  De-Escalation Techniques: quiet time facilitated  Goal: Optimal Cognitive Function (Psychotic Signs/Symptoms)  Outcome: Progressing  Flowsheets (Taken 7/16/2024 1000)  Mutually Determined Action Steps (Optimal Cognitive Function): participates in attention training  Intervention: Support and Promote Cognitive Ability  Flowsheets (Taken 7/16/2024 1000)  Trust Relationship/Rapport: care explained  Goal: Increased Participation and Engagement (Psychotic Signs/Symptoms)  Outcome: Progressing  Flowsheets (Taken 7/16/2024 1000)  Mutually Determined Action Steps (Increased Participation and Engagement): identifies symptoms triggers  Intervention: Facilitate Participation and Engagement  Flowsheets (Taken 7/16/2024 1000)  Supportive Measures: self-care encouraged  Diversional Activity: television  Goal: Improved Mood Symptoms (Psychotic Signs/Symptoms)  Outcome: Progressing  Flowsheets (Taken 7/16/2024 1000)  Mutually Determined Action Steps (Improved Mood Symptoms): acknowledges progress  Intervention: Optimize Emotion and Mood  Flowsheets (Taken 7/16/2024 1000)  Supportive Measures: self-care encouraged  Diversional Activity: television  Goal: Improved Psychomotor Symptoms (Psychotic Signs/Symptoms)  Outcome: Progressing  Flowsheets (Taken 7/16/2024 1000)  Mutually Determined Action Steps (Improved Psychomotor Symptoms): exhibits decrease in agitation  Intervention: Manage Psychomotor Movement  Flowsheets (Taken 7/16/2024 1000)  Activity (Behavioral Health): up ad ismael  Patient Performed Hygiene: teeth brushed  Diversional Activity: television  Goal: Decreased Sensory Symptoms (Psychotic Signs/Symptoms)  Outcome: Progressing  Flowsheets (Taken 7/16/2024  1000)  Mutually Determined Action Steps (Decreased Sensory Symptoms): adheres to medication regimen  Intervention: Minimize and Manage Sensory Impairment  Flowsheets (Taken 7/16/2024 1000)  Sensory Stimulation Regulation: quiet environment promoted  Goal: Improved Sleep (Psychotic Signs/Symptoms)  Outcome: Progressing  Flowsheets (Taken 7/16/2024 1000)  Mutually Determined Action Steps (Improved Sleep): sleeps 4-6 hours at night  Intervention: Promote Healthy Sleep Hygiene  Flowsheets (Taken 7/16/2024 1000)  Sleep Hygiene Promotion: awakenings minimized  Goal: Enhanced Social, Occupational or Functional Skills (Psychotic Signs/Symptoms)  Outcome: Progressing  Flowsheets (Taken 7/16/2024 1000)  Mutually Determined Action Steps (Enhanced Social, Occupational or Functional Skills): participates in social skills training  Intervention: Promote Social, Occupational and Functional Ability  Flowsheets (Taken 7/16/2024 1000)  Trust Relationship/Rapport: care explained  Social Functional Ability Promotion: social interaction promoted     Problem: Excessive Substance Use  Goal: Optimized Energy Level (Excessive Substance Use)  Outcome: Progressing  Flowsheets (Taken 7/16/2024 1000)  Mutually Determined Action Steps (Optimized Energy Level): grooms self without prompting  Intervention: Optimize Energy Level  Flowsheets (Taken 7/16/2024 1000)  Activity (Behavioral Health): up ad ismael  Patient Performed Hygiene: teeth brushed  Diversional Activity: television  Goal: Improved Behavioral Control (Excessive Substance Use)  Outcome: Progressing  Flowsheets (Taken 7/16/2024 1000)  Mutually Determined Action Steps (Improved Behavioral Control): identifies major stressors  Intervention: Promote Behavior and Impulse Control  Flowsheets (Taken 7/16/2024 1000)  Behavior Management: behavioral plan reviewed  Goal: Increased Participation and Engagement (Excessive Substance Use)  Outcome: Progressing  Flowsheets (Taken 7/16/2024  1000)  Mutually Determined Action Steps (Increased Participation and Engagement): discusses ongoing recovery plan  Intervention: Facilitate Participation and Engagement  Flowsheets (Taken 7/16/2024 1000)  Supportive Measures: self-care encouraged  Diversional Activity: television  Goal: Improved Physiologic Symptoms (Excessive Substance Use)  Outcome: Progressing  Flowsheets (Taken 7/16/2024 1000)  Mutually Determined Action Steps (Improved Physiologic Symptoms): discusses use pattern  Intervention: Optimize Physiologic Function  Flowsheets (Taken 7/16/2024 1000)  Oral Nutrition Promotion: rest periods promoted  Nutrition Interventions: food preferences provided  Goal: Enhanced Social, Occupational or Functional Skills (Excessive Substance Use)  Outcome: Progressing  Flowsheets (Taken 7/16/2024 1000)  Mutually Determined Action Steps (Enhanced Social, Occupational or Functional Skills): participates in social skills training  Intervention: Promote Social, Occupational and Functional Ability  Flowsheets (Taken 7/16/2024 1000)  Trust Relationship/Rapport: care explained  Social Functional Ability Promotion: social interaction promoted     Problem: Suicide Risk  Goal: Absence of Self-Harm  Outcome: Progressing  Intervention: Assess Risk to Self and Maintain Safety  Flowsheets (Taken 7/16/2024 1000)  Behavior Management: behavioral plan reviewed  Self-Harm Prevention: environmental self-harm risks assessed  Intervention: Promote Psychosocial Wellbeing  Flowsheets (Taken 7/16/2024 1000)  Sleep/Rest Enhancement: regular sleep/rest pattern promoted  Supportive Measures: self-care encouraged  Family/Support System Care: self-care encouraged  Intervention: Establish Safety Plan and Continuity of Care  Flowsheets (Taken 7/16/2024 1000)  Safe Transition Promotion: access to lethal means addressed    He is AAO X 4. Flat affect and blunted mood. Anxious. Denies SI, HI, hallucinations, and delusions. Isolated and withdrawn, but  interacts with selected patients and staff. Good eye contact noted. Speech normal tone and speed. Continue plan of care and provide a safe and therapeutic environment. Continue to monitor every fifteen minutes for safety.

## 2024-07-17 PROBLEM — F39 MODERATE MOOD DISORDER: Status: ACTIVE | Noted: 2024-07-17

## 2024-07-17 PROCEDURE — 11400000 HC PSYCH PRIVATE ROOM

## 2024-07-17 PROCEDURE — 25000003 PHARM REV CODE 250: Performed by: NURSE PRACTITIONER

## 2024-07-17 PROCEDURE — 25000003 PHARM REV CODE 250

## 2024-07-17 RX ORDER — FLUOXETINE HYDROCHLORIDE 20 MG/1
20 CAPSULE ORAL DAILY
Qty: 30 CAPSULE | Refills: 0 | Status: SHIPPED | OUTPATIENT
Start: 2024-07-18 | End: 2025-07-18

## 2024-07-17 RX ORDER — OLANZAPINE 7.5 MG/1
7.5 TABLET ORAL NIGHTLY
Qty: 30 TABLET | Refills: 0 | Status: SHIPPED | OUTPATIENT
Start: 2024-07-17 | End: 2025-07-17

## 2024-07-17 RX ADMIN — TRAZODONE HYDROCHLORIDE 100 MG: 100 TABLET ORAL at 08:07

## 2024-07-17 RX ADMIN — FLUOXETINE 20 MG: 20 CAPSULE ORAL at 08:07

## 2024-07-17 RX ADMIN — OLANZAPINE 7.5 MG: 5 TABLET, FILM COATED ORAL at 08:07

## 2024-07-17 NOTE — PLAN OF CARE
Problem: Adult Behavioral Health Plan of Care  Goal: Patient-Specific Goal (Individualization)  Outcome: Progressing  Flowsheets (Taken 7/17/2024 1029)  Patient Personal Strengths: independent living skills  Patient Vulnerabilities: limited support system  Goal: Adheres to Safety Considerations for Self and Others  Outcome: Progressing  Flowsheets (Taken 7/17/2024 1029)  Adheres to Safety Considerations for Self and Others: making progress toward outcome  Intervention: Develop and Maintain Individualized Safety Plan  Flowsheets (Taken 7/17/2024 1029)  Safety Measures: safety rounds completed  Goal: Absence of New-Onset Illness or Injury  Outcome: Progressing  Intervention: Identify and Manage Fall Risk  Flowsheets (Taken 7/17/2024 1029)  Safety Measures: safety rounds completed  Intervention: Prevent VTE (Venous Thromboembolism)  Flowsheets (Taken 7/17/2024 1029)  VTE Prevention/Management: fluids promoted  Intervention: Prevent Infection  Flowsheets (Taken 7/17/2024 1029)  Infection Prevention: hand hygiene promoted  Goal: Optimized Coping Skills in Response to Life Stressors  Outcome: Progressing  Flowsheets (Taken 7/17/2024 1029)  Optimized Coping Skills in Response to Life Stressors: making progress toward outcome  Intervention: Promote Effective Coping Strategies  Flowsheets (Taken 7/17/2024 1029)  Supportive Measures: self-care encouraged  Goal: Develops/Participates in Therapeutic [place] to Support Successful Transition  Outcome: Progressing  Flowsheets (Taken 7/17/2024 1029)  Develops/Participates in Therapeutic [place] to Support Successful Transition: making progress toward outcome  Intervention: Foster Therapeutic [place]  Flowsheets (Taken 7/17/2024 1029)  Trust Relationship/Rapport: care explained  Goal: Rounds/Family Conference  Outcome: Progressing  Flowsheets (Taken 7/17/2024 1029)  Participants:   milieu/psych techs   nursing     Problem: Psychotic Signs/Symptoms  Goal: Improved Behavioral  Control (Psychotic Signs/Symptoms)  Outcome: Progressing  Flowsheets (Taken 7/17/2024 1029)  Mutually Determined Action Steps (Improved Behavioral Control): identifies symptoms triggers  Intervention: Manage Behavior  Flowsheets (Taken 7/17/2024 1029)  De-Escalation Techniques: quiet time facilitated  Goal: Optimal Cognitive Function (Psychotic Signs/Symptoms)  Outcome: Progressing  Flowsheets (Taken 7/17/2024 1029)  Mutually Determined Action Steps (Optimal Cognitive Function): participates in attention training  Intervention: Support and Promote Cognitive Ability  Flowsheets (Taken 7/17/2024 1029)  Trust Relationship/Rapport: care explained  Goal: Increased Participation and Engagement (Psychotic Signs/Symptoms)  Outcome: Progressing  Flowsheets (Taken 7/17/2024 1029)  Mutually Determined Action Steps (Increased Participation and Engagement): identifies symptoms triggers  Intervention: Facilitate Participation and Engagement  Flowsheets (Taken 7/17/2024 1029)  Supportive Measures: self-care encouraged  Diversional Activity: television  Goal: Improved Mood Symptoms (Psychotic Signs/Symptoms)  Outcome: Progressing  Flowsheets (Taken 7/17/2024 1029)  Mutually Determined Action Steps (Improved Mood Symptoms): acknowledges progress  Intervention: Optimize Emotion and Mood  Flowsheets (Taken 7/17/2024 1029)  Supportive Measures: self-care encouraged  Diversional Activity: television  Goal: Improved Psychomotor Symptoms (Psychotic Signs/Symptoms)  Outcome: Progressing  Flowsheets (Taken 7/17/2024 1029)  Mutually Determined Action Steps (Improved Psychomotor Symptoms): adheres to medication regimen  Intervention: Manage Psychomotor Movement  Flowsheets (Taken 7/17/2024 1029)  Activity (Behavioral Health): up ad ismael  Patient Performed Hygiene: teeth brushed  Diversional Activity: television  Goal: Decreased Sensory Symptoms (Psychotic Signs/Symptoms)  Outcome: Progressing  Flowsheets (Taken 7/17/2024 1029)  Mutually  Determined Action Steps (Decreased Sensory Symptoms): adheres to medication regimen  Intervention: Minimize and Manage Sensory Impairment  Flowsheets (Taken 7/17/2024 1029)  Sensory Stimulation Regulation: quiet environment promoted  Goal: Improved Sleep (Psychotic Signs/Symptoms)  Outcome: Progressing  Flowsheets (Taken 7/17/2024 1029)  Mutually Determined Action Steps (Improved Sleep): sleeps 4-6 hours at night  Intervention: Promote Healthy Sleep Hygiene  Flowsheets (Taken 7/17/2024 1029)  Sleep Hygiene Promotion: awakenings minimized  Goal: Enhanced Social, Occupational or Functional Skills (Psychotic Signs/Symptoms)  Outcome: Progressing  Flowsheets (Taken 7/17/2024 1029)  Mutually Determined Action Steps (Enhanced Social, Occupational or Functional Skills): participates in social skills training  Intervention: Promote Social, Occupational and Functional Ability  Flowsheets (Taken 7/17/2024 1029)  Trust Relationship/Rapport: care explained  Social Functional Ability Promotion: autonomy promoted     Problem: Excessive Substance Use  Goal: Optimized Energy Level (Excessive Substance Use)  Outcome: Progressing  Flowsheets (Taken 7/17/2024 1029)  Mutually Determined Action Steps (Optimized Energy Level): grooms self without prompting  Intervention: Optimize Energy Level  Flowsheets (Taken 7/17/2024 1029)  Activity (Behavioral Health): up ad ismael  Patient Performed Hygiene: teeth brushed  Diversional Activity: television  Goal: Improved Behavioral Control (Excessive Substance Use)  Outcome: Progressing  Flowsheets (Taken 7/17/2024 1029)  Mutually Determined Action Steps (Improved Behavioral Control): identifies major stressors  Intervention: Promote Behavior and Impulse Control  Flowsheets (Taken 7/17/2024 1029)  Behavior Management: behavioral plan reviewed  Goal: Increased Participation and Engagement (Excessive Substance Use)  Outcome: Progressing  Flowsheets (Taken 7/17/2024 1029)  Mutually Determined Action  Steps (Increased Participation and Engagement): discusses ongoing recovery plan  Intervention: Facilitate Participation and Engagement  Flowsheets (Taken 7/17/2024 1029)  Supportive Measures: self-care encouraged  Diversional Activity: television  Goal: Improved Physiologic Symptoms (Excessive Substance Use)  Outcome: Progressing  Flowsheets (Taken 7/17/2024 1029)  Mutually Determined Action Steps (Improved Physiologic Symptoms): discusses use pattern  Intervention: Optimize Physiologic Function  Flowsheets (Taken 7/17/2024 1029)  Oral Nutrition Promotion: rest periods promoted  Nutrition Interventions: food preferences provided  Goal: Enhanced Social, Occupational or Functional Skills (Excessive Substance Use)  Outcome: Progressing  Flowsheets (Taken 7/17/2024 1029)  Mutually Determined Action Steps (Enhanced Social, Occupational or Functional Skills): participates in social skills training  Intervention: Promote Social, Occupational and Functional Ability  Flowsheets (Taken 7/17/2024 1029)  Trust Relationship/Rapport: care explained  Social Functional Ability Promotion: autonomy promoted     Problem: Suicide Risk  Goal: Absence of Self-Harm  Outcome: Progressing  Intervention: Assess Risk to Self and Maintain Safety  Flowsheets (Taken 7/17/2024 1029)  Behavior Management: behavioral plan reviewed  Self-Harm Prevention: environmental self-harm risks assessed  Intervention: Promote Psychosocial Wellbeing  Flowsheets (Taken 7/17/2024 1029)  Sleep/Rest Enhancement: regular sleep/rest pattern promoted  Supportive Measures: self-care encouraged  Family/Support System Care: self-care encouraged  Intervention: Establish Safety Plan and Continuity of Care  Flowsheets (Taken 7/17/2024 1029)  Safe Transition Promotion: access to lethal means addressed    He is AAO X 4. Flat affect and blunted mood. Anxious. Denies SI, HI, hallucinations, and delusions. Interacts with selected patients and staff. Good eye contact noted.  Speech normal tone and speed. Continue plan of care and provide a safe and therapeutic environment. Continue to monitor every fifteen minutes for safety.

## 2024-07-17 NOTE — PROGRESS NOTES
"7/17/2024  Kevin Dukes   1970   [id number]        Psychiatry Progress Note     Chief Complaint: "Doing ok"    SUBJECTIVE:   Kevin Dukes is a 54 y.o. male placed under a PEC at [facility] for auditory hallucinations and homicidal ideations.     Attending and participating in groups per staff.   Still plans to go home on discharge and follow-up with [facility].  No acute complaints.  Tolerating current medication regimen without issues.  No overt behavioral issues reported by staff.  Denies thoughts of self-harm or harm to others.  Will plan for discharge tomorrow.     UDS: (+) Cocaine  ETOH: (-)      Current Medications:   Scheduled Meds:    FLUoxetine  20 mg Oral Daily    OLANZapine  7.5 mg Oral QHS      PRN Meds:   Current Facility-Administered Medications:     acetaminophen, 650 mg, Oral, Q6H PRN    aluminum-magnesium hydroxide-simethicone, 30 mL, Oral, Q6H PRN    cloNIDine, 0.1 mg, Oral, Q8H PRN    cloNIDine, 0.2 mg, Oral, Q8H PRN    haloperidoL, 10 mg, Oral, Q4H PRN **AND** diphenhydrAMINE, 50 mg, Oral, Q4H PRN **AND** LORazepam, 2 mg, Oral, Q4H PRN **AND** haloperidol lactate, 10 mg, Intramuscular, Q4H PRN **AND** diphenhydrAMINE, 50 mg, Intramuscular, Q4H PRN **AND** lorazepam, 2 mg, Intramuscular, Q4H PRN    hydrOXYzine HCL, 50 mg, Oral, Q4H PRN    magnesium hydroxide 400 mg/5 ml, 30 mL, Oral, Daily PRN    ondansetron, 4 mg, Oral, Q6H PRN    traZODone, 100 mg, Oral, Nightly PRN   Psychotherapeutics (From admission, onward)      Start     Stop Route Frequency Ordered    07/16/24 2100  OLANZapine tablet 7.5 mg         -- Oral Nightly 07/16/24 0920    07/13/24 1315  FLUoxetine capsule 20 mg         -- Oral Daily 07/13/24 1214    07/12/24 1123  traZODone tablet 100 mg         -- Oral Nightly PRN 07/12/24 1104    07/12/24 1119  haloperidoL tablet 10 mg  (Med - Acute  Behavioral Management)        Placed in "And" Linked Group    -- Oral Every 4 hours PRN 07/12/24 1104    07/12/24 1119  " "LORazepam tablet 2 mg  (Med - Acute  Behavioral Management)        Placed in "And" Linked Group    -- Oral Every 4 hours PRN 07/12/24 1104    07/12/24 1119  haloperidol lactate injection 10 mg  (Med - Acute  Behavioral Management)        Placed in "And" Linked Group    -- IM Every 4 hours PRN 07/12/24 1104    07/12/24 1119  LORazepam injection 2 mg  (Med - Acute  Behavioral Management)        Placed in "And" Linked Group    -- IM Every 4 hours PRN 07/12/24 1104            Allergies:   Review of patient's allergies indicates:  No Known Allergies     OBJECTIVE:   Vitals   Vitals:    07/16/24 0701   BP: (!) 140/72   Pulse: 76   Resp: 16   Temp: 97.1 °F (36.2 °C)        Labs/Imaging/Studies:   Recent Results (from the past 36 hour(s))   Hemoglobin A1C    Collection Time: 07/16/24  6:34 AM   Result Value Ref Range    Hemoglobin A1c 5.4 <=7.0 %    Estimated Average Glucose 108.3 mg/dL   Glucose, Fasting    Collection Time: 07/16/24  6:34 AM   Result Value Ref Range    Glucose Fasting 83 70 - 100 mg/dL   TSH    Collection Time: 07/16/24  6:34 AM   Result Value Ref Range    TSH 1.990 0.350 - 4.940 uIU/mL   Lipid Panel    Collection Time: 07/16/24  6:34 AM   Result Value Ref Range    Cholesterol Total 203 (H) <=200 mg/dL    HDL Cholesterol 65 (H) 35 - 60 mg/dL    Triglyceride 93 34 - 140 mg/dL    Cholesterol/HDL Ratio 3 0 - 5    Very Low Density Lipoprotein 19     LDL Cholesterol 119.00 50.00 - 140.00 mg/dL   SYPHILIS ANTIBODY (WITH REFLEX RPR)    Collection Time: 07/16/24  6:34 AM   Result Value Ref Range    Syphilis Antibody Nonreactive Nonreactive, Equivocal          Medical Review Of Systems:  Constitutional: negative  Respiratory: negative  Cardiovascular: negative  Gastrointestinal: negative  Genitourinary:negative  Musculoskeletal:negative  Neurological: negative       Psychiatric Mental Status Exam:  General Appearance: appears stated age, well-developed, well-nourished  Arousal: alert  Behavior: " "cooperative  Movements and Motor Activity: no abnormal involuntary movements noted  Orientation: oriented to person, place, time, and situation  Speech: normal rate, normal rhythm, normal volume, normal tone  Mood: "Ok"  Affect: constricted  Thought Process: linear  Associations: intact  Thought Content and Perceptions: no suicidal ideation, no homicidal ideation, no auditory hallucinations, no visual hallucinations, no paranoid ideation, no ideas of reference  Recent and Remote Memory: recent memory intact, remote memory intact; per interview/observation with patient  Attention and Concentration: intact, attentive to conversation; per interview/observation with patient  Fund of Knowledge: intact, aware of current events, vocabulary appropriate; based on history, vocabulary, fund of knowledge, syntax, grammar, and content  Insight: questionable; based on understanding of severity of illness and HPI  Judgment: questionable; based on patient's behavior and HPI      ASSESSMENT/PLAN:   Problems Addressed/Diagnoses:  Unspecified Mood Disorder (F39)  Cocaine Use Disorder (F14.20)    Past Medical History:   Diagnosis Date    Bipolar disorder, unspecified     History of psychiatric hospitalization     Hx of psychiatric care     [facility]     Psychiatric problem     Schizophrenia, unspecified     Substance abuse         Plan:  Mood disorder, acute  -Continue Zyprexa  -Continue Prozac    Cocaine use, acute  -Group/Individual psychotherapy     -D/c tomorrow    Expected Disposition Plan: Home        Juan M Anand M.D.    "

## 2024-07-17 NOTE — PLAN OF CARE
Kevin attended treatment team, was cooperative, reporting improvement, and is progressing towards his treatment goal. Kevin attends TR groups, is attentive but passive, interacts with peers and staff, and attends his ADL's.

## 2024-07-17 NOTE — NURSING
Treatment Team Note:      Behavior:    Patient (Kevin Dukes is a 54 y.o. male, : 1970, MRN: [id number]) demonstrating an affect that was congruent. Kevin demonstrating mood that is pleasant and appropriate. Kevin had an appearance that was clean. Kevin denies suicidal ideation. Kevin denies suicide plan. Kevin denies hallucinations.      Intervention:    Encourage Kevin to perform self-hygiene, grooming, and changing of clothing. Encourage Kevin to attend all scheduled groups. Monitor Kevin's behavior and program compliance. Monitor Kevin for suicidal ideation, homicidal ideation, sleep disturbance, and hallucinations. Encourage Kevin to eat all portions of meals and assess for meal preferences. Monitor Kevin for intake and output to ensure hydration. Notify the Physician/Physician Assistant/Advance Practice Registered Nurse (MD/PA/APRN) for any medication refusal and any change in patient condition.    Discussed with Kevin course of treatment. Discussed with Kevin medications ordered and schedule of medications. Discussed collateral contact with Kevin.      Response:    Kevin's response to treatment team meeting: cooperative. States he is feeling better since being on medications.      Plan:     Continue to monitor per MD/PA/APRN orders; maintain patient safety.

## 2024-07-17 NOTE — PROGRESS NOTES
07/17/24 [address] [place] [address] Group Therapy   Group Name Therapeutic Recreation   Specific Interventions Skilled Activity Creative Expression   Participation Level Active;Supportive;Appropriate;Attentive;Sharing   Participation Quality Cooperative;Social   Insight/Motivation Good   Affect/Mood Display Appropriate;Bright   Cognition Oriented;Alert   Psychomotor WNL

## 2024-07-17 NOTE — PROGRESS NOTES
07/17/24 1500   [facility] [place] Group Therapy   Group Name Therapeutic Recreation   Specific Interventions Skilled Activity Leisure Education and Awareness   Participation Level Active;Supportive;Appropriate;Attentive;Sharing   Participation Quality Cooperative;Social   Insight/Motivation Good   Affect/Mood Display Appropriate;Bright   Cognition Oriented;Alert   Psychomotor WNL

## 2024-07-17 NOTE — CARE UPDATE
Treatment Team  Pt seen for treatment team today with interdisciplinary team. Pt was calm and cooperative with appropriate thought process with Tx team. Pt verbalized understanding of care plan and agreed to being discharged to home located at [address] [place] [address] with follow-up with [facility].

## 2024-07-17 NOTE — PLAN OF CARE
Problem: Adult Behavioral Health Plan of Care  Goal: Patient-Specific Goal (Individualization)  Outcome: Progressing  Flowsheets (Taken 7/16/2024 2326)  Patient Personal Strengths: medication/treatment adherence  Patient Vulnerabilities: limited support system  Goal: Adheres to Safety Considerations for Self and Others  Outcome: Progressing  Flowsheets (Taken 7/16/2024 2326)  Adheres to Safety Considerations for Self and Others: making progress toward outcome  Goal: Absence of New-Onset Illness or Injury  Outcome: Progressing  Goal: Optimized Coping Skills in Response to Life Stressors  Outcome: Progressing  Flowsheets (Taken 7/16/2024 2326)  Optimized Coping Skills in Response to Life Stressors: making progress toward outcome  Goal: Develops/Participates in Therapeutic [place] to Support Successful Transition  Outcome: Progressing  Flowsheets (Taken 7/16/2024 2326)  Develops/Participates in Therapeutic [place] to Support Successful Transition: making progress toward outcome  Goal: Rounds/Family Conference  Outcome: Progressing     Problem: Psychotic Signs/Symptoms  Goal: Improved Behavioral Control (Psychotic Signs/Symptoms)  Outcome: Progressing  Flowsheets (Taken 7/16/2024 2326)  Mutually Determined Action Steps (Improved Behavioral Control): identifies symptoms triggers  Goal: Optimal Cognitive Function (Psychotic Signs/Symptoms)  Outcome: Progressing  Flowsheets (Taken 7/16/2024 2326)  Mutually Determined Action Steps (Optimal Cognitive Function): remains focused during activity  Goal: Increased Participation and Engagement (Psychotic Signs/Symptoms)  Outcome: Progressing  Flowsheets (Taken 7/16/2024 2326)  Mutually Determined Action Steps (Increased Participation and Engagement): identifies symptoms triggers  Goal: Improved Mood Symptoms (Psychotic Signs/Symptoms)  Outcome: Progressing  Flowsheets (Taken 7/16/2024 2326)  Mutually Determined Action Steps (Improved Mood Symptoms): acknowledges  progress  Goal: Improved Psychomotor Symptoms (Psychotic Signs/Symptoms)  Outcome: Progressing  Flowsheets (Taken 7/16/2024 2326)  Mutually Determined Action Steps (Improved Psychomotor Symptoms): adheres to medication regimen  Goal: Decreased Sensory Symptoms (Psychotic Signs/Symptoms)  Outcome: Progressing  Flowsheets (Taken 7/16/2024 2326)  Mutually Determined Action Steps (Decreased Sensory Symptoms): adheres to medication regimen  Goal: Improved Sleep (Psychotic Signs/Symptoms)  Outcome: Progressing  Goal: Enhanced Social, Occupational or Functional Skills (Psychotic Signs/Symptoms)  Outcome: Progressing     Problem: Excessive Substance Use  Goal: Optimized Energy Level (Excessive Substance Use)  Outcome: Progressing  Flowsheets (Taken 7/16/2024 2326)  Mutually Determined Action Steps (Optimized Energy Level): dresses/ready for morning activity  Goal: Improved Behavioral Control (Excessive Substance Use)  Outcome: Progressing  Flowsheets (Taken 7/16/2024 2326)  Mutually Determined Action Steps (Improved Behavioral Control): identifies major stressors  Goal: Increased Participation and Engagement (Excessive Substance Use)  Outcome: Progressing  Goal: Improved Physiologic Symptoms (Excessive Substance Use)  Outcome: Progressing  Goal: Enhanced Social, Occupational or Functional Skills (Excessive Substance Use)  Outcome: Progressing     Problem: Suicide Risk  Goal: Absence of Self-Harm  Outcome: Progressing   Remains anxious and flat.  Compliant with meds and cooperative with staff..

## 2024-07-17 NOTE — CARE UPDATE
Transportation scheduled via medicaid transportation. Trip number is [id number]. Patient is being transported to [address] [place] [address]

## 2024-07-18 VITALS
HEART RATE: 60 BPM | WEIGHT: 177 LBS | DIASTOLIC BLOOD PRESSURE: 89 MMHG | RESPIRATION RATE: 18 BRPM | BODY MASS INDEX: 23.46 KG/M2 | OXYGEN SATURATION: 100 % | HEIGHT: 73 IN | TEMPERATURE: 98 F | SYSTOLIC BLOOD PRESSURE: 146 MMHG

## 2024-07-18 PROCEDURE — 25000003 PHARM REV CODE 250: Performed by: NURSE PRACTITIONER

## 2024-07-18 RX ADMIN — FLUOXETINE 20 MG: 20 CAPSULE ORAL at 08:07

## 2024-07-18 NOTE — PROGRESS NOTES
"7/18/2024  Kevin Dukes   1970   [id number]        Psychiatry Progress Note     Chief Complaint: "Doing ok"    SUBJECTIVE:   Kevin Dukes is a 54 y.o. male placed under a PEC at [facility] for auditory hallucinations and homicidal ideations.     Today patient states that he is feeling good. Continued to attend and participate in groups throughout stay. No acute complaints.  Tolerating current medication regimen without issues.  No overt behavioral issues reported by staff.  Eating and sleeping well. Denies thoughts of self-harm or harm to others.  Will proceed with discharge today.     UDS: (+) Cocaine  ETOH: (-)      Current Medications:   Scheduled Meds:    FLUoxetine  20 mg Oral Daily    OLANZapine  7.5 mg Oral QHS      PRN Meds:   Current Facility-Administered Medications:     acetaminophen, 650 mg, Oral, Q6H PRN    aluminum-magnesium hydroxide-simethicone, 30 mL, Oral, Q6H PRN    cloNIDine, 0.1 mg, Oral, Q8H PRN    cloNIDine, 0.2 mg, Oral, Q8H PRN    haloperidoL, 10 mg, Oral, Q4H PRN **AND** diphenhydrAMINE, 50 mg, Oral, Q4H PRN **AND** LORazepam, 2 mg, Oral, Q4H PRN **AND** haloperidol lactate, 10 mg, Intramuscular, Q4H PRN **AND** diphenhydrAMINE, 50 mg, Intramuscular, Q4H PRN **AND** lorazepam, 2 mg, Intramuscular, Q4H PRN    hydrOXYzine HCL, 50 mg, Oral, Q4H PRN    magnesium hydroxide 400 mg/5 ml, 30 mL, Oral, Daily PRN    ondansetron, 4 mg, Oral, Q6H PRN    traZODone, 100 mg, Oral, Nightly PRN   Psychotherapeutics (From admission, onward)      Start     Stop Route Frequency Ordered    07/16/24 2100  OLANZapine tablet 7.5 mg         -- Oral Nightly 07/16/24 0920    07/13/24 1315  FLUoxetine capsule 20 mg         -- Oral Daily 07/13/24 1214    07/12/24 1123  traZODone tablet 100 mg         -- Oral Nightly PRN 07/12/24 1104    07/12/24 1119  haloperidoL tablet 10 mg  (Med - Acute  Behavioral Management)        Placed in "And" Linked Group    -- Oral Every 4 hours PRN 07/12/24 1104    07/12/24 " "1119  LORazepam tablet 2 mg  (Med - Acute  Behavioral Management)        Placed in "And" Linked Group    -- Oral Every 4 hours PRN 07/12/24 1104    07/12/24 1119  haloperidol lactate injection 10 mg  (Med - Acute  Behavioral Management)        Placed in "And" Linked Group    -- IM Every 4 hours PRN 07/12/24 1104    07/12/24 1119  LORazepam injection 2 mg  (Med - Acute  Behavioral Management)        Placed in "And" Linked Group    -- IM Every 4 hours PRN 07/12/24 1104            Allergies:   Review of patient's allergies indicates:  No Known Allergies     OBJECTIVE:   Vitals   Vitals:    07/17/24 0701   BP: 129/87   Pulse: 71   Resp: 18   Temp: 97.7 °F (36.5 °C)        Labs/Imaging/Studies:   No results found for this or any previous visit (from the past 36 hour(s)).         Medical Review Of Systems:  Constitutional: negative  Respiratory: negative  Cardiovascular: negative  Gastrointestinal: negative  Genitourinary:negative  Musculoskeletal:negative  Neurological: negative       Psychiatric Mental Status Exam:  General Appearance: appears stated age, well-developed, well-nourished  Arousal: alert  Behavior: cooperative  Movements and Motor Activity: no abnormal involuntary movements noted  Orientation: oriented to person, place, time, and situation  Speech: normal rate, normal rhythm, normal volume, normal tone  Mood: "Ok"  Affect: constricted  Thought Process: linear  Associations: intact  Thought Content and Perceptions: no suicidal ideation, no homicidal ideation, no auditory hallucinations, no visual hallucinations, no paranoid ideation, no ideas of reference  Recent and Remote Memory: recent memory intact, remote memory intact; per interview/observation with patient  Attention and Concentration: intact, attentive to conversation; per interview/observation with patient  Fund of Knowledge: intact, aware of current events, vocabulary appropriate; based on history, vocabulary, fund of knowledge, syntax, grammar, " and content  Insight: questionable; based on understanding of severity of illness and HPI  Judgment: questionable; based on patient's behavior and HPI      ASSESSMENT/PLAN:   Problems Addressed/Diagnoses:  Unspecified Mood Disorder (F39)  Cocaine Use Disorder (F14.20)    Past Medical History:   Diagnosis Date    Bipolar disorder, unspecified     History of psychiatric hospitalization     Hx of psychiatric care     [facility]     Psychiatric problem     Schizophrenia, unspecified     Substance abuse         Plan:  Mood disorder, acute  -Continue Zyprexa  -Continue Prozac    Cocaine use, acute  -Group/Individual psychotherapy       Expected Disposition Plan: Home        UNRULY Aranda-BC

## 2024-07-18 NOTE — PLAN OF CARE
Problem: Adult Behavioral Health Plan of Care  Goal: Patient-Specific Goal (Individualization)  Outcome: Met  Flowsheets (Taken 7/17/2024 2310)  Patient Personal Strengths: independent living skills  Patient Vulnerabilities:   limited support system   substance abuse/addiction  Goal: Adheres to Safety Considerations for Self and Others  Outcome: Met  Flowsheets (Taken 7/17/2024 2310)  Adheres to Safety Considerations for Self and Others: achieves outcome  Intervention: Develop and Maintain Individualized Safety Plan  Flowsheets (Taken 7/17/2024 2310)  Safety Measures:   monitored by video   safety rounds completed  Goal: Absence of New-Onset Illness or Injury  Outcome: Met  Intervention: Identify and Manage Fall Risk  Flowsheets (Taken 7/17/2024 2310)  Safety Measures:   monitored by video   safety rounds completed  Intervention: Prevent VTE (Venous Thromboembolism)  Flowsheets (Taken 7/17/2024 2310)  VTE Prevention/Management:   ambulation promoted   fluids promoted  Intervention: Prevent Infection  Flowsheets (Taken 7/17/2024 2310)  Infection Prevention:   rest/sleep promoted   hand hygiene promoted  Goal: Optimized Coping Skills in Response to Life Stressors  Outcome: Met  Flowsheets (Taken 7/17/2024 2310)  Optimized Coping Skills in Response to Life Stressors: achieves outcome  Intervention: Promote Effective Coping Strategies  Flowsheets (Taken 7/17/2024 2310)  Supportive Measures:   active listening utilized   verbalization of feelings encouraged  Goal: Develops/Participates in Therapeutic [place] to Support Successful Transition  Outcome: Met  Flowsheets (Taken 7/17/2024 2310)  Develops/Participates in Therapeutic [place] to Support Successful Transition: achieves outcome  Intervention: Foster Therapeutic [place]  Flowsheets (Taken 7/17/2024 2310)  Trust Relationship/Rapport:   care explained   thoughts/feelings acknowledged  Intervention: Mutually Develop Transition Plan  Flowsheets (Taken 7/17/2024  2310)  Current Discharge Risk:   psychiatric illness   substance use/abuse  Readmission Within the Last 30 Days: no previous admission in last 30 days  Outpatient/Agency/Support Group Needs:   outpatient counseling   outpatient medication management  Anticipated Discharge Disposition: homeless  Transportation Concerns: no car  Patient/Family Anticipated Services at Transition:   outpatient care   mental health services  Patient/Family Anticipates Transition to: shelter  Concerns to be Addressed:   medication   mental health   substance/tobacco abuse/use  Goal: Rounds/Family Conference  Outcome: Met  Flowsheets (Taken 7/17/2024 2310)  Participants:   milieu/psych techs   nursing     Problem: Psychotic Signs/Symptoms  Goal: Improved Behavioral Control (Psychotic Signs/Symptoms)  Outcome: Met  Flowsheets (Taken 7/17/2024 2310)  Mutually Determined Action Steps (Improved Behavioral Control): identifies symptoms triggers  Intervention: Manage Behavior  Flowsheets (Taken 7/17/2024 2310)  De-Escalation Techniques:   medication offered   medication administered  Goal: Optimal Cognitive Function (Psychotic Signs/Symptoms)  Outcome: Met  Intervention: Support and Promote Cognitive Ability  Flowsheets (Taken 7/17/2024 2310)  Trust Relationship/Rapport:   care explained   thoughts/feelings acknowledged  Goal: Increased Participation and Engagement (Psychotic Signs/Symptoms)  Outcome: Met  Intervention: Facilitate Participation and Engagement  Flowsheets (Taken 7/17/2024 2310)  Supportive Measures:   active listening utilized   verbalization of feelings encouraged  Diversional Activity: television  Goal: Improved Mood Symptoms (Psychotic Signs/Symptoms)  Outcome: Met  Flowsheets (Taken 7/17/2024 2310)  Mutually Determined Action Steps (Improved Mood Symptoms):   acknowledges progress   verbalizes increased insight  Intervention: Optimize Emotion and Mood  Flowsheets (Taken 7/17/2024 2310)  Supportive Measures:   active listening  utilized   verbalization of feelings encouraged  Diversional Activity: television  Goal: Improved Psychomotor Symptoms (Psychotic Signs/Symptoms)  Outcome: Met  Flowsheets (Taken 7/17/2024 2310)  Mutually Determined Action Steps (Improved Psychomotor Symptoms): adheres to medication regimen  Intervention: Manage Psychomotor Movement  Flowsheets (Taken 7/17/2024 2310)  Activity (Behavioral Health): up ad ismael  Diversional Activity: television  Goal: Decreased Sensory Symptoms (Psychotic Signs/Symptoms)  Outcome: Met  Flowsheets (Taken 7/17/2024 2310)  Mutually Determined Action Steps (Decreased Sensory Symptoms): adheres to medication regimen  Intervention: Minimize and Manage Sensory Impairment  Flowsheets (Taken 7/17/2024 2310)  Sensory Stimulation Regulation: quiet environment promoted  Goal: Improved Sleep (Psychotic Signs/Symptoms)  Outcome: Met  Flowsheets (Taken 7/17/2024 2310)  Mutually Determined Action Steps (Improved Sleep): sleeps 4-6 hours at night  Intervention: Promote Healthy Sleep Hygiene  Flowsheets (Taken 7/17/2024 2310)  Sleep Hygiene Promotion:   awakenings minimized   regular sleep pattern promoted  Goal: Enhanced Social, Occupational or Functional Skills (Psychotic Signs/Symptoms)  Outcome: Met  Flowsheets (Taken 7/17/2024 2310)  Mutually Determined Action Steps (Enhanced Social, Occupational or Functional Skills): participates in social skills training  Intervention: Promote Social, Occupational and Functional Ability  Flowsheets (Taken 7/17/2024 2310)  Trust Relationship/Rapport:   care explained   thoughts/feelings acknowledged  Social Functional Ability Promotion:   autonomy promoted   self-expression encouraged     Problem: Excessive Substance Use  Goal: Optimized Energy Level (Excessive Substance Use)  Outcome: Met  Flowsheets (Taken 7/17/2024 2310)  Mutually Determined Action Steps (Optimized Energy Level): grooms self without prompting  Intervention: Optimize Energy Level  Flowsheets  (Taken 7/17/2024 2310)  Activity (Behavioral Health): up ad ismael  Diversional Activity: television  Goal: Improved Behavioral Control (Excessive Substance Use)  Outcome: Met  Flowsheets (Taken 7/17/2024 2310)  Mutually Determined Action Steps (Improved Behavioral Control): identifies major stressors  Intervention: Promote Behavior and Impulse Control  Flowsheets (Taken 7/17/2024 2310)  Behavior Management: behavioral plan reviewed  Goal: Increased Participation and Engagement (Excessive Substance Use)  Outcome: Met  Flowsheets (Taken 7/17/2024 2310)  Mutually Determined Action Steps (Increased Participation and Engagement): discusses ongoing recovery plan  Intervention: Facilitate Participation and Engagement  Flowsheets (Taken 7/17/2024 2310)  Supportive Measures:   active listening utilized   verbalization of feelings encouraged  Diversional Activity: television  Goal: Improved Physiologic Symptoms (Excessive Substance Use)  Outcome: Met  Flowsheets (Taken 7/17/2024 2310)  Mutually Determined Action Steps (Improved Physiologic Symptoms): verbalizes physical symptoms  Intervention: Optimize Physiologic Function  Flowsheets (Taken 7/17/2024 2310)  Oral Nutrition Promotion: rest periods promoted  Nutrition Interventions: food preferences provided  Goal: Enhanced Social, Occupational or Functional Skills (Excessive Substance Use)  Outcome: Met  Flowsheets (Taken 7/17/2024 2310)  Mutually Determined Action Steps (Enhanced Social, Occupational or Functional Skills): participates in social skills training  Intervention: Promote Social, Occupational and Functional Ability  Flowsheets (Taken 7/17/2024 2310)  Trust Relationship/Rapport:   care explained   thoughts/feelings acknowledged  Social Functional Ability Promotion:   autonomy promoted   self-expression encouraged     Problem: Suicide Risk  Goal: Absence of Self-Harm  Outcome: Met  Intervention: Assess Risk to Self and Maintain Safety  Flowsheets (Taken 7/17/2024  2310)  Behavior Management: behavioral plan reviewed  Enhanced Safety Measures: monitored by video  Self-Harm Prevention: environmental self-harm risks assessed  Intervention: Promote Psychosocial Wellbeing  Flowsheets (Taken 7/17/2024 2310)  Sleep/Rest Enhancement:   awakenings minimized   regular sleep/rest pattern promoted  Supportive Measures:   active listening utilized   verbalization of feelings encouraged  Family/Support System Care: self-care encouraged  Intervention: Establish Safety Plan and Continuity of Care  Flowsheets (Taken 7/17/2024 2310)  Safe Transition Promotion: access to lethal means addressed   AAOx4. Flat affect. Anxious mood. Improved thought process and mood. Denies suicidal and homicidal ideations as well as hallucinations. Interacting more with staff and peers. Participated in group today. Reports that he is eating and sleeping well. Compliant with medications and reports that they are effective. Trazodone 100 mg po given for sleep. No agitation or aggression noted. Continue with plan of care and q 15 minute safety checks.

## 2024-07-18 NOTE — NURSING
Discharge Note:    Kevin Dukes is a 54 y.o. male, : 1970, MRN: [id number], admitted on 2024 for Juan M Anand MD with a diagnosis of Psychosis [F29].    Patient discharged on 2024 per physician orders in stable condition. Patient denied suicidal ideation, homicidal ideation, or hallucinations. Patient was discharged with valuables, personal belongings, prescriptions, discharge instructions, and an educational handout explaining the diagnosis and prescribed medications. Patient verbalized understanding of the discharge instructions and importance of follow-up visits. Patient was escorted out of the facility by [facility] and placed into a Medicaid transportation to be transported to home.     Patient discharged on the following medications:     Medication List        START taking these medications      FLUoxetine 20 MG capsule  Take 1 capsule (20 mg total) by mouth once daily.            CHANGE how you take these medications      OLANZapine 7.5 MG tablet  Commonly known as: ZyPREXA  Take 1 tablet (7.5 mg total) by mouth every evening.  What changed:   medication strength  how much to take            STOP taking these medications      amLODIPine 10 MG tablet  Commonly known as: NORVASC     amoxicillin-clavulanate 875-125mg 875-125 mg per tablet  Commonly known as: AUGMENTIN     atorvastatin 40 MG tablet  Commonly known as: LIPITOR     buPROPion 150 MG TB24 tablet  Commonly known as: WELLBUTRIN XL     butalbital-acetaminophen-caffeine -40 mg -40 mg per tablet  Commonly known as: FIORICET, ESGIC     carvediloL 6.25 MG tablet  Commonly known as: COREG     cetirizine 10 MG tablet  Commonly known as: ZYRTEC     furosemide 40 MG tablet  Commonly known as: LASIX     hydrALAZINE 50 MG tablet  Commonly known as: APRESOLINE     isosorbide dinitrate 30 MG Tab  Commonly known as: ISORDIL     losartan 25 MG tablet  Commonly known as: COZAAR     nitroGLYCERIN 0.4 MG SL  tablet  Commonly known as: NITROSTAT     nortriptyline 10 MG capsule  Commonly known as: PAMELOR     olmesartan 40 MG tablet  Commonly known as: BENICAR     pantoprazole 40 MG tablet  Commonly known as: PROTONIX     rosuvastatin 20 MG tablet  Commonly known as: CRESTOR     sucralfate 1 gram tablet  Commonly known as: CARAFATE     venlafaxine 37.5 MG Tab  Commonly known as: EFFEXOR               Where to Get Your Medications        You can get these medications from any pharmacy    Bring a paper prescription for each of these medications  FLUoxetine 20 MG capsule  OLANZapine 7.5 MG tablet

## 2024-07-18 NOTE — PLAN OF CARE
Kevin met reported treatment goals Coping skills and get rid of the voices.  CTRS Discharge Recommendations:  Encouraged Pt. to actively utilize available community resources to increase leisure involvement to decrease signs and symptoms of illness.  Encouraged Pt. to utilize coping skills on a regular basis to reduce the risk of decomposition and re-hospitalization.

## 2024-07-18 NOTE — PROGRESS NOTES
07/18/24 1000   [facility] Group Therapy   Group Name Therapeutic Recreation   Specific Interventions Skilled Activity Mild Exercises   Participation Level Active;Supportive;Appropriate;Attentive;Sharing   Participation Quality Cooperative;Social   Insight/Motivation Good   Affect/Mood Display Appropriate   Cognition Oriented;Alert   Psychomotor WNL

## 2024-08-03 ENCOUNTER — HOSPITAL ENCOUNTER (EMERGENCY)
Facility: HOSPITAL | Age: 54
Discharge: PSYCHIATRIC OR SUBSTANCE USE FACILITY | End: 2024-08-04
Attending: FAMILY MEDICINE
Payer: MEDICAID

## 2024-08-03 DIAGNOSIS — R44.0 AUDITORY HALLUCINATIONS: ICD-10-CM

## 2024-08-03 DIAGNOSIS — Z00.8 MEDICAL CLEARANCE FOR PSYCHIATRIC ADMISSION: Primary | ICD-10-CM

## 2024-08-03 LAB
ALBUMIN SERPL-MCNC: 3.7 G/DL (ref 3.5–5)
ALBUMIN/GLOB SERPL: 1.1 RATIO (ref 1.1–2)
ALP SERPL-CCNC: 66 UNIT/L (ref 40–150)
ALT SERPL-CCNC: 12 UNIT/L (ref 0–55)
AMPHET UR QL SCN: NEGATIVE
ANION GAP SERPL CALC-SCNC: 6 MEQ/L
AST SERPL-CCNC: 16 UNIT/L (ref 5–34)
BACTERIA #/AREA URNS AUTO: ABNORMAL /HPF
BARBITURATE SCN PRESENT UR: NEGATIVE
BASOPHILS # BLD AUTO: 0.07 X10(3)/MCL
BASOPHILS NFR BLD AUTO: 1 %
BENZODIAZ UR QL SCN: NEGATIVE
BILIRUB SERPL-MCNC: 0.3 MG/DL
BILIRUB UR QL STRIP.AUTO: NEGATIVE
BUN SERPL-MCNC: 15.1 MG/DL (ref 8.4–25.7)
CALCIUM SERPL-MCNC: 9 MG/DL (ref 8.4–10.2)
CANNABINOIDS UR QL SCN: NEGATIVE
CHLORIDE SERPL-SCNC: 107 MMOL/L (ref 98–107)
CLARITY UR: CLEAR
CO2 SERPL-SCNC: 24 MMOL/L (ref 22–29)
COCAINE UR QL SCN: POSITIVE
COLOR UR AUTO: ABNORMAL
CREAT SERPL-MCNC: 1.27 MG/DL (ref 0.73–1.18)
CREAT/UREA NIT SERPL: 12
EOSINOPHIL # BLD AUTO: 0.15 X10(3)/MCL (ref 0–0.9)
EOSINOPHIL NFR BLD AUTO: 2.1 %
ERYTHROCYTE [DISTWIDTH] IN BLOOD BY AUTOMATED COUNT: 14.5 % (ref 11.5–17)
ETHANOL SERPL-MCNC: <10 MG/DL
FENTANYL UR QL SCN: NEGATIVE
GFR SERPLBLD CREATININE-BSD FMLA CKD-EPI: >60 ML/MIN/1.73/M2
GLOBULIN SER-MCNC: 3.5 GM/DL (ref 2.4–3.5)
GLUCOSE SERPL-MCNC: 94 MG/DL (ref 74–100)
GLUCOSE UR QL STRIP: NORMAL
HCT VFR BLD AUTO: 37.9 % (ref 42–52)
HGB BLD-MCNC: 12.3 G/DL (ref 14–18)
HGB UR QL STRIP: NEGATIVE
HOLD SPECIMEN: NORMAL
HOLD SPECIMEN: NORMAL
HYALINE CASTS #/AREA URNS LPF: ABNORMAL /LPF
IMM GRANULOCYTES # BLD AUTO: 0.02 X10(3)/MCL (ref 0–0.04)
IMM GRANULOCYTES NFR BLD AUTO: 0.3 %
KETONES UR QL STRIP: NEGATIVE
LEUKOCYTE ESTERASE UR QL STRIP: NEGATIVE
LYMPHOCYTES # BLD AUTO: 1.45 X10(3)/MCL (ref 0.6–4.6)
LYMPHOCYTES NFR BLD AUTO: 20.1 %
MCH RBC QN AUTO: 30.4 PG (ref 27–31)
MCHC RBC AUTO-ENTMCNC: 32.5 G/DL (ref 33–36)
MCV RBC AUTO: 93.6 FL (ref 80–94)
MDMA UR QL SCN: NEGATIVE
MONOCYTES # BLD AUTO: 0.6 X10(3)/MCL (ref 0.1–1.3)
MONOCYTES NFR BLD AUTO: 8.3 %
MUCOUS THREADS URNS QL MICRO: ABNORMAL /LPF
NEUTROPHILS # BLD AUTO: 4.94 X10(3)/MCL (ref 2.1–9.2)
NEUTROPHILS NFR BLD AUTO: 68.2 %
NITRITE UR QL STRIP: NEGATIVE
NRBC BLD AUTO-RTO: 0 %
OPIATES UR QL SCN: NEGATIVE
PCP UR QL: NEGATIVE
PH UR STRIP: 6 [PH]
PH UR: 6 [PH] (ref 3–11)
PLATELET # BLD AUTO: 209 X10(3)/MCL (ref 130–400)
PMV BLD AUTO: 11.6 FL (ref 7.4–10.4)
POTASSIUM SERPL-SCNC: 3.7 MMOL/L (ref 3.5–5.1)
PROT SERPL-MCNC: 7.2 GM/DL (ref 6.4–8.3)
PROT UR QL STRIP: ABNORMAL
RBC # BLD AUTO: 4.05 X10(6)/MCL (ref 4.7–6.1)
RBC #/AREA URNS AUTO: ABNORMAL /HPF
SARS-COV-2 RDRP RESP QL NAA+PROBE: NEGATIVE
SODIUM SERPL-SCNC: 137 MMOL/L (ref 136–145)
SP GR UR STRIP.AUTO: 1.02 (ref 1–1.03)
SPECIFIC GRAVITY, URINE AUTO (.000) (OHS): 1.02 (ref 1–1.03)
SQUAMOUS #/AREA URNS LPF: ABNORMAL /HPF
TSH SERPL-ACNC: 1.02 UIU/ML (ref 0.35–4.94)
UROBILINOGEN UR STRIP-ACNC: NORMAL
WBC # BLD AUTO: 7.23 X10(3)/MCL (ref 4.5–11.5)
WBC #/AREA URNS AUTO: ABNORMAL /HPF

## 2024-08-03 PROCEDURE — 84443 ASSAY THYROID STIM HORMONE: CPT | Performed by: FAMILY MEDICINE

## 2024-08-03 PROCEDURE — 81001 URINALYSIS AUTO W/SCOPE: CPT | Performed by: FAMILY MEDICINE

## 2024-08-03 PROCEDURE — 80307 DRUG TEST PRSMV CHEM ANLYZR: CPT | Performed by: FAMILY MEDICINE

## 2024-08-03 PROCEDURE — U0002 COVID-19 LAB TEST NON-CDC: HCPCS | Performed by: FAMILY MEDICINE

## 2024-08-03 PROCEDURE — 85025 COMPLETE CBC W/AUTO DIFF WBC: CPT | Performed by: FAMILY MEDICINE

## 2024-08-03 PROCEDURE — 80053 COMPREHEN METABOLIC PANEL: CPT | Performed by: FAMILY MEDICINE

## 2024-08-03 PROCEDURE — 82077 ASSAY SPEC XCP UR&BREATH IA: CPT | Performed by: FAMILY MEDICINE

## 2024-08-03 PROCEDURE — 99285 EMERGENCY DEPT VISIT HI MDM: CPT

## 2024-08-04 VITALS
HEIGHT: 75 IN | RESPIRATION RATE: 18 BRPM | WEIGHT: 177.69 LBS | HEART RATE: 73 BPM | OXYGEN SATURATION: 98 % | TEMPERATURE: 99 F | BODY MASS INDEX: 22.09 KG/M2 | DIASTOLIC BLOOD PRESSURE: 87 MMHG | SYSTOLIC BLOOD PRESSURE: 123 MMHG

## 2024-08-04 NOTE — ED PROVIDER NOTES
Encounter Date: 8/3/2024       History     Chief Complaint   Patient presents with    hearing voices    Homicidal     States hearing voices  x 2 days and they are telling him to hurt others.  States noncompliance with meds.       54-year-old gentleman presents emergency room for psychiatric evaluation.  Patient reports having auditory hallucinations telling him to harm other people.  Symptoms have been ongoing for the last 2 days.  History of schizophrenia.  Currently calm cooperative.    The history is provided by the patient.     Review of patient's allergies indicates:  No Known Allergies  Past Medical History:   Diagnosis Date    Bipolar disorder, unspecified     History of psychiatric hospitalization     Hx of psychiatric care     [place]     Psychiatric problem     Schizophrenia, unspecified     Substance abuse      History reviewed. No pertinent surgical history.  Family History   Problem Relation Name Age of Onset    Depression Mother      Depression Brother       Social History     Tobacco Use    Smoking status: Former     Types: Cigarettes    Smokeless tobacco: Never   Substance Use Topics    Alcohol use: Not Currently    Drug use: Not Currently     Types: Cocaine, Methamphetamines     Review of Systems   Constitutional:  Negative for chills, fatigue and fever.   HENT:  Negative for ear pain, rhinorrhea and sore throat.    Eyes:  Negative for photophobia and pain.   Respiratory:  Negative for cough, shortness of breath and wheezing.    Cardiovascular:  Negative for chest pain.   Gastrointestinal:  Negative for abdominal pain, diarrhea, nausea and vomiting.   Genitourinary:  Negative for dysuria.   Neurological:  Negative for dizziness, weakness and headaches.   All other systems reviewed and are negative.      Physical Exam     Initial Vitals [08/03/24 1913]   BP Pulse Resp Temp SpO2   139/89 62 16 98.2 °F (36.8 °C) 97 %      MAP       --         Physical Exam    Nursing note and vitals  reviewed.  Constitutional: He appears well-developed and well-nourished.   HENT:   Head: Normocephalic and atraumatic.   Eyes: EOM are normal. Pupils are equal, round, and reactive to light.   Neck: Neck supple.   Normal range of motion.  Cardiovascular:  Normal rate, regular rhythm and normal heart sounds.     Exam reveals no gallop and no friction rub.       No murmur heard.  Pulmonary/Chest: Breath sounds normal. No respiratory distress.   Abdominal: Abdomen is soft. Bowel sounds are normal. He exhibits no distension. There is no abdominal tenderness.   Musculoskeletal:         General: Normal range of motion.      Cervical back: Normal range of motion and neck supple.     Neurological: He is alert and oriented to person, place, and time. He has normal strength.   Skin: Skin is warm and dry.   Psychiatric: He has a normal mood and affect. His behavior is normal. He is actively hallucinating. Cognition and memory are normal. He expresses impulsivity. He expresses homicidal ideation.         ED Course   Procedures  Labs Reviewed   COMPREHENSIVE METABOLIC PANEL - Abnormal       Result Value    Sodium 137      Potassium 3.7      Chloride 107      CO2 24      Glucose 94      Blood Urea Nitrogen 15.1      Creatinine 1.27 (*)     Calcium 9.0      Protein Total 7.2      Albumin 3.7      Globulin 3.5      Albumin/Globulin Ratio 1.1      Bilirubin Total 0.3      ALP 66      ALT 12      AST 16      eGFR >60      Anion Gap 6.0      BUN/Creatinine Ratio 12     URINALYSIS, REFLEX TO URINE CULTURE - Abnormal    Color, UA Light-Yellow      Appearance, UA Clear      Specific Gravity, UA 1.024      pH, UA 6.0      Protein, UA Trace (*)     Glucose, UA Normal      Ketones, UA Negative      Blood, UA Negative      Bilirubin, UA Negative      Urobilinogen, UA Normal      Nitrites, UA Negative      Leukocyte Esterase, UA Negative      RBC, UA 0-5      WBC, UA 0-5      Bacteria, UA None Seen      Squamous Epithelial Cells, UA Trace (*)      Mucous, UA Trace (*)     Hyaline Casts, UA None Seen     DRUG SCREEN, URINE (BEAKER) - Abnormal    Amphetamines, Urine Negative      Barbiturates, Urine Negative      Benzodiazepine, Urine Negative      Cannabinoids, Urine Negative      Cocaine, Urine Positive (*)     Fentanyl, Urine Negative      MDMA, Urine Negative      Opiates, Urine Negative      Phencyclidine, Urine Negative      pH, Urine 6.0      Specific Gravity, Urine Auto 1.024      Narrative:     Cut off concentrations:    Amphetamines - 1000 ng/ml  Barbiturates - 200 ng/ml  Benzodiazepine - 200 ng/ml  Cannabinoids (THC) - 50 ng/ml  Cocaine - 300 ng/ml  Fentanyl - 1.0 ng/ml  MDMA - 500 ng/ml  Opiates - 300 ng/ml   Phencyclidine (PCP) - 25 ng/ml    Specimen submitted for drug analysis and tested for pH and specific gravity in order to evaluate sample integrity. Suspect tampering if specific gravity is <1.003 and/or pH is not within the range of 4.5 - 8.0  False negatives may result form substances such as bleach added to urine.  False positives may result for the presence of a substance with similar chemical structure to the drug or its metabolite.    This test provides only a PRELIMINARY analytical test result. A more specific alternate chemical method must be used in order to obtain a confirmed analytical result. Gas chromatography/mass spectrometry (GC/MS) is the preferred confirmatory method. Other chemical confirmation methods are available. Clinical consideration and professional judgement should be applied to any drug of abuse test result, particularly when preliminary positive results are used.    Positive results will be confirmed only at the physicians request. Unconfirmed screening results are to be used only for medical purposes (treatment).        CBC WITH DIFFERENTIAL - Abnormal    WBC 7.23      RBC 4.05 (*)     Hgb 12.3 (*)     Hct 37.9 (*)     MCV 93.6      MCH 30.4      MCHC 32.5 (*)     RDW 14.5      Platelet 209      MPV 11.6 (*)      Neut % 68.2      Lymph % 20.1      Mono % 8.3      Eos % 2.1      Basophil % 1.0      Lymph # 1.45      Neut # 4.94      Mono # 0.60      Eos # 0.15      Baso # 0.07      IG# 0.02      IG% 0.3      NRBC% 0.0     ALCOHOL,MEDICAL (ETHANOL) - Normal    Ethanol Level <10.0     SARS-COV-2 RNA AMPLIFICATION, QUAL - Normal    SARS COV-2 Molecular Negative      Narrative:     The IDNOW COVID-19 assay is a rapid molecular in vitro diagnostic test utilizing an isothermal nucleic acid amplification technology intended for the qualitative detection of nucleic acid from the SARS-CoV-2 viral RNA in direct nasal, nasopharyngeal or throat swabs from individuals who are suspected of COVID-19 by their healthcare provider.   CBC W/ AUTO DIFFERENTIAL    Narrative:     The following orders were created for panel order CBC auto differential.  Procedure                               Abnormality         Status                     ---------                               -----------         ------                     CBC with Differential[[id number]]       Abnormal            Final result                 Please view results for these tests on the individual orders.   TSH   EXTRA TUBES    Narrative:     The following orders were created for panel order EXTRA TUBES.  Procedure                               Abnormality         Status                     ---------                               -----------         ------                     Light Blue Top Hold[[id number]]                             In process                 Gold Top Hold[[id number]]                                   In process                   Please view results for these tests on the individual orders.   LIGHT BLUE TOP HOLD   GOLD TOP HOLD          Imaging Results    None          Medications - No data to display  Medical Decision Making  54-year-old gentleman presents emergency room with auditory hallucinations telling him to harm other people.  Currently calm  cooperative.  Has been noncompliant with psychiatric medications.  Will obtain clearance laboratory evaluations, and place under a physician's Emergency certificate for inpatient psychiatric evaluation treatment     Differential diagnosis:  Schizophrenia, auditory hallucinations, homicidal ideations, substance abuse    Amount and/or Complexity of Data Reviewed  Labs: ordered. Decision-making details documented in ED Course.               ED Course as of 08/03/24 2034   Sat Aug 03, 2024   2032 Cocaine, Urine(!): Positive [MW]   2033 Sodium: 137 [MW]   2033 Potassium: 3.7 [MW]   2033 Chloride: 107 [MW]   2033 CO2: 24 [MW]   2033 Glucose: 94 [MW]   2033 BUN: 15.1 [MW]   2033 Creatinine(!): 1.27 [MW]   2033 WBC: 7.23 [MW]   2033 Hemoglobin(!): 12.3 [MW]   2033 Hematocrit(!): 37.9 [MW]   2033 Platelet Count: 209 [MW]   2033 Alcohol, Serum: <10.0  Noted to be cocaine positive.  Medically cleared for psychiatric placement. [MW]      ED Course User Index  [MW] Issac Gleason MD       Medically cleared for psychiatry placement: 8/3/2024  8:33 PM                   Clinical Impression:  Final diagnoses:  [Z00.8] Medical clearance for psychiatric admission (Primary)  [R44.0] Auditory hallucinations          ED Disposition Condition    Transfer to Psych Facility Stable          ED Prescriptions    None       Follow-up Information    None          Issac Gleason MD  08/03/24 2034

## 2024-09-18 ENCOUNTER — HOSPITAL ENCOUNTER (EMERGENCY)
Facility: HOSPITAL | Age: 54
Discharge: HOME | End: 2024-09-18
Attending: EMERGENCY MEDICINE
Payer: MEDICAID

## 2024-09-18 VITALS
SYSTOLIC BLOOD PRESSURE: 156 MMHG | TEMPERATURE: 98 F | HEART RATE: 66 BPM | DIASTOLIC BLOOD PRESSURE: 94 MMHG | RESPIRATION RATE: 18 BRPM | OXYGEN SATURATION: 98 % | HEIGHT: 75 IN | WEIGHT: 185 LBS | BODY MASS INDEX: 23 KG/M2

## 2024-09-18 DIAGNOSIS — M54.50 CHRONIC MIDLINE LOW BACK PAIN WITHOUT SCIATICA: Primary | ICD-10-CM

## 2024-09-18 DIAGNOSIS — G89.29 CHRONIC MIDLINE LOW BACK PAIN WITHOUT SCIATICA: Primary | ICD-10-CM

## 2024-09-18 PROCEDURE — 99284 EMERGENCY DEPT VISIT MOD MDM: CPT

## 2024-09-18 PROCEDURE — 25000003 PHARM REV CODE 250: Performed by: PHYSICIAN ASSISTANT

## 2024-09-18 RX ORDER — KETOROLAC TROMETHAMINE 10 MG/1
10 TABLET, FILM COATED ORAL
Status: COMPLETED | OUTPATIENT
Start: 2024-09-18 | End: 2024-09-18

## 2024-09-18 RX ORDER — LIDOCAINE 50 MG/G
1 PATCH TOPICAL
Status: DISCONTINUED | OUTPATIENT
Start: 2024-09-18 | End: 2024-09-19 | Stop reason: HOSPADM

## 2024-09-18 RX ORDER — NAPROXEN 500 MG/1
500 TABLET ORAL 2 TIMES DAILY WITH MEALS
Qty: 20 TABLET | Refills: 0 | Status: SHIPPED | OUTPATIENT
Start: 2024-09-18

## 2024-09-18 RX ORDER — LIDOCAINE 50 MG/G
1 PATCH TOPICAL DAILY
Qty: 15 PATCH | Refills: 0 | Status: SHIPPED | OUTPATIENT
Start: 2024-09-18

## 2024-09-18 RX ADMIN — KETOROLAC TROMETHAMINE 10 MG: 10 TABLET, FILM COATED ORAL at 10:09

## 2024-09-18 RX ADMIN — LIDOCAINE 1 PATCH: 50 PATCH CUTANEOUS at 10:09

## 2024-09-19 NOTE — ED TRIAGE NOTES
Kevin Dukes, a 54 y.o. male presents to the ED w/ complaint of lower back pain starting today. Hx of chronic pain. Denies any bladder or bowel incontinence.    Triage note:  Chief Complaint   Patient presents with    Back Pain     Pt states back pain since this morning. Pt states he has chronic lower back pain, but this feels worse than normal     Review of patient's allergies indicates:  No Known Allergies  Past Medical History:   Diagnosis Date    Bipolar disorder, unspecified     History of psychiatric hospitalization     Hx of psychiatric care     [place]     Psychiatric problem     Schizophrenia, unspecified     Substance abuse     Patient identifiers for Kevin Dukes checked and correct.    LOC: The patient is awake, alert and aware of environment with an appropriate affect, the patient is oriented x 4 and speaking appropriately.    APPEARANCE: Patient resting comfortably and in no acute distress, patient is clean and well groomed, patient's clothing is properly fastened.    SKIN: The skin is warm and dry, color consistent with ethnicity, patient has normal skin turgor and moist mucus membranes, skin intact, no breakdown or bruising noted.    RESPIRATORY: Airway is open and patent, respirations are spontaneous and even, patient has a normal effort and rate.    CARDIAC: Patient has a normal rate and rhythm, no periphreal edema noted, capillary refill < 3 seconds.    ABDOMEN: Soft and non tender to palpation, no distention noted. Patient denies any nausea, vomiting, diarrhea, or constipation.     NEUROLOGIC: Eyes open spontaneously, PERRL, behavior appropriate to situation, follows commands, facial expression symmetrical, bilateral hand grasp equal and even, purposeful motor response noted, normal sensation in all extremities.     HEENT: No abnormalities noted. White sclera and pupils equal round and reactive to light. Denies headache, dizziness.     : Pt voids independently, denies dysuria,  hematuria, frequency.

## 2024-09-19 NOTE — FIRST PROVIDER EVALUATION
"Medical screening examination initiated.  I have conducted a focused provider triage encounter, findings are as follows:    Brief history of present illness:  55yo M presenting with back pain since this AM, hx chronic back pain, worse today, with standing, mid back "to L-4 L5 area". No B/B incontinence. No history of trauma.     Vitals:    09/18/24 2133   BP: (!) 156/94   Pulse: 66   Resp: 18   Temp: 98.1 °F (36.7 °C)   SpO2: 98%   Weight: 83.9 kg (185 lb)   Height: 6' 3" (1.905 m)       Pertinent physical exam:  ambulatory without difficulty, NAD    Preliminary workup initiated; this workup will be continued and followed by the physician or advanced practice provider that is assigned to the patient when roomed.  "

## 2024-09-19 NOTE — ED PROVIDER NOTES
Encounter Date: 9/18/2024       History     Chief Complaint   Patient presents with    Back Pain     Pt states back pain since this morning. Pt states he has chronic lower back pain, but this feels worse than normal     54-year-old male with a history of bipolar disorder, schizophrenia, substance abuse, chronic back pain presents ER for evaluation of back pain.  Patient reports symptoms ongoing for the last couple days.  No injury or trauma.  Reports that the pain is located to the mid back, without radiation.  Denies any paresthesia focal weakness.  No bowel or bladder dysfunction.  No saddle anesthesia.  No flank pain or UTI symptoms.    The history is provided by the patient.     Review of patient's allergies indicates:  No Known Allergies  Past Medical History:   Diagnosis Date    Bipolar disorder, unspecified     History of psychiatric hospitalization     Hx of psychiatric care     [place]     Psychiatric problem     Schizophrenia, unspecified     Substance abuse      History reviewed. No pertinent surgical history.  Family History   Problem Relation Name Age of Onset    Depression Mother      Depression Brother       Social History     Tobacco Use    Smoking status: Former     Types: Cigarettes    Smokeless tobacco: Never   Substance Use Topics    Alcohol use: Not Currently    Drug use: Not Currently     Types: Cocaine, Methamphetamines     Review of Systems   Constitutional:  Negative for chills and fever.   Eyes:  Negative for visual disturbance.   Respiratory:  Negative for shortness of breath.    Cardiovascular:  Negative for chest pain.   Gastrointestinal:  Negative for nausea and vomiting.   Genitourinary:  Negative for dysuria and flank pain.   Musculoskeletal:  Positive for arthralgias and myalgias.   Skin:  Negative for rash.   Allergic/Immunologic: Negative for immunocompromised state.   Neurological:  Negative for weakness and numbness.   Hematological:  Does not bruise/bleed easily.  "  Psychiatric/Behavioral:  Negative for confusion.        Physical Exam     Initial Vitals [09/18/24 2133]   BP Pulse Resp Temp SpO2   (!) 156/94 66 18 98.1 °F (36.7 °C) 98 %      MAP       --         Physical Exam    Vitals reviewed.  Constitutional: He appears well-developed and well-nourished. He is not diaphoretic. No distress.   HENT:   Head: Normocephalic and atraumatic.   Eyes: Conjunctivae and EOM are normal.   Neck: Neck supple.   Cardiovascular:  Intact distal pulses.           Pulmonary/Chest: No respiratory distress.   Musculoskeletal:         General: Normal range of motion.      Cervical back: Normal and neck supple.      Thoracic back: Normal.      Lumbar back: Tenderness (paraspinal muscle bilaterally) and bony tenderness present. Negative right straight leg raise test and negative left straight leg raise test.     Neurological: He is alert and oriented to person, place, and time.   Skin: Skin is warm.         ED Course   Procedures  Labs Reviewed - No data to display       Imaging Results    None          Medications   LIDOcaine 5 % patch 1 patch (1 patch Transdermal Patch Applied 9/18/24 2230)   ketorolac tablet 10 mg (10 mg Oral Given 9/18/24 2231)     Medical Decision Making  Risk  Prescription drug management.         APC / Resident Notes:   Patient seen in the ER promptly upon arrival.  He is afebrile, no acute distress.  Ambulatory with a steady gait.  No focal neurological deficit on exam.  He has tenderness on palpation to the mid lower back and paraspinal muscles.  No deformity or step-offs.    Patient ordered Toradol and Lidoderm patch.  Patient requesting for "something stronger".  Explained to patient that I will not be prescribing narcotic pain medication for his chronic pain.  He is requesting for MRI.  He does not require emergent MRI at this time.    Chart review:  X-ray lumbar spine04/2024:    FINDINGS:  Lumbar vertebrae stature and alignment is preserved. Intervertebral disc spaces " are without significant degenerative changes.  There is some facet arthropathy at L5-S1.  No acute fracture or malalignment identified       He does not require imaging at this time given absence of fall, trauma or neurological deficit.  Prescribed home with naproxen and Lidoderm patch.  He is otherwise stable for discharge and close follow-up with primary doctor provided                               Clinical Impression:  Final diagnoses:  [M54.50, G89.29] Chronic midline low back pain without sciatica (Primary)          ED Disposition Condition    Discharge Stable          ED Prescriptions       Medication Sig Dispense Start Date End Date Auth. Provider    LIDOcaine (LIDODERM) 5 % Place 1 patch onto the skin once daily. Remove & Discard patch within 12 hours or as directed by MD 15 patch 9/18/2024 -- Nisa Duarte PA-C    naproxen (NAPROSYN) 500 MG tablet Take 1 tablet (500 mg total) by mouth 2 (two) times daily with meals. 20 tablet 9/18/2024 -- Nisa Duarte PA-C          Follow-up Information       Follow up With Specialties Details Why Contact Info    [facility] - [facility]    [id number] [facility] [place] [address]  [phone]               Nisa Duarte PA-C  09/18/24 8218

## 2024-09-19 NOTE — DISCHARGE INSTRUCTIONS
Follow up with primary care physician provided to you.  Use medication as prescribed.  You can try a heating pad to the back.  Return to ER with concerning or worsening symptoms